# Patient Record
Sex: FEMALE | Race: OTHER | HISPANIC OR LATINO | URBAN - METROPOLITAN AREA
[De-identification: names, ages, dates, MRNs, and addresses within clinical notes are randomized per-mention and may not be internally consistent; named-entity substitution may affect disease eponyms.]

---

## 2023-07-18 ENCOUNTER — INPATIENT (INPATIENT)
Facility: HOSPITAL | Age: 73
LOS: 6 days | Discharge: HOME | End: 2023-07-25
Attending: HOSPITALIST | Admitting: HOSPITALIST
Payer: MEDICAID

## 2023-07-18 VITALS — RESPIRATION RATE: 20 BRPM | HEART RATE: 100 BPM | OXYGEN SATURATION: 100 %

## 2023-07-18 DIAGNOSIS — I51.9 HEART DISEASE, UNSPECIFIED: ICD-10-CM

## 2023-07-18 LAB
A1C WITH ESTIMATED AVERAGE GLUCOSE RESULT: 6.9 % — HIGH (ref 4–5.6)
ALBUMIN SERPL ELPH-MCNC: 4.1 G/DL — SIGNIFICANT CHANGE UP (ref 3.3–5)
ALP SERPL-CCNC: 191 U/L — HIGH (ref 40–120)
ALT FLD-CCNC: 62 U/L — HIGH (ref 4–33)
ANION GAP SERPL CALC-SCNC: 15 MMOL/L — HIGH (ref 7–14)
APTT BLD: 35.8 SEC — SIGNIFICANT CHANGE UP (ref 27–36.3)
AST SERPL-CCNC: 42 U/L — HIGH (ref 4–32)
BASOPHILS # BLD AUTO: 0.1 K/UL — SIGNIFICANT CHANGE UP (ref 0–0.2)
BASOPHILS NFR BLD AUTO: 1.1 % — SIGNIFICANT CHANGE UP (ref 0–2)
BILIRUB DIRECT SERPL-MCNC: 1.2 MG/DL — HIGH (ref 0–0.3)
BILIRUB INDIRECT FLD-MCNC: 1 MG/DL — SIGNIFICANT CHANGE UP (ref 0–1)
BILIRUB SERPL-MCNC: 2.2 MG/DL — HIGH (ref 0.2–1.2)
BUN SERPL-MCNC: 20 MG/DL — SIGNIFICANT CHANGE UP (ref 7–23)
CALCIUM SERPL-MCNC: 10 MG/DL — SIGNIFICANT CHANGE UP (ref 8.4–10.5)
CHLORIDE SERPL-SCNC: 81 MMOL/L — LOW (ref 98–107)
CHOLEST SERPL-MCNC: 167 MG/DL — SIGNIFICANT CHANGE UP
CO2 SERPL-SCNC: 32 MMOL/L — HIGH (ref 22–31)
CREAT SERPL-MCNC: 0.69 MG/DL — SIGNIFICANT CHANGE UP (ref 0.5–1.3)
EGFR: 91 ML/MIN/1.73M2 — SIGNIFICANT CHANGE UP
EOSINOPHIL # BLD AUTO: 0.38 K/UL — SIGNIFICANT CHANGE UP (ref 0–0.5)
EOSINOPHIL NFR BLD AUTO: 4.3 % — SIGNIFICANT CHANGE UP (ref 0–6)
ESTIMATED AVERAGE GLUCOSE: 151 — SIGNIFICANT CHANGE UP
GLUCOSE SERPL-MCNC: 148 MG/DL — HIGH (ref 70–99)
HCT VFR BLD CALC: 38.4 % — SIGNIFICANT CHANGE UP (ref 34.5–45)
HDLC SERPL-MCNC: 65 MG/DL — SIGNIFICANT CHANGE UP
HGB BLD-MCNC: 12.5 G/DL — SIGNIFICANT CHANGE UP (ref 11.5–15.5)
IANC: 6.31 K/UL — SIGNIFICANT CHANGE UP (ref 1.8–7.4)
IMM GRANULOCYTES NFR BLD AUTO: 0.9 % — SIGNIFICANT CHANGE UP (ref 0–0.9)
INR BLD: 1.58 RATIO — HIGH (ref 0.88–1.16)
LACTATE BLDV-MCNC: 1.7 MMOL/L — SIGNIFICANT CHANGE UP (ref 0.5–2)
LIPID PNL WITH DIRECT LDL SERPL: 90 MG/DL — SIGNIFICANT CHANGE UP
LYMPHOCYTES # BLD AUTO: 1.06 K/UL — SIGNIFICANT CHANGE UP (ref 1–3.3)
LYMPHOCYTES # BLD AUTO: 11.9 % — LOW (ref 13–44)
MCHC RBC-ENTMCNC: 26.7 PG — LOW (ref 27–34)
MCHC RBC-ENTMCNC: 32.6 GM/DL — SIGNIFICANT CHANGE UP (ref 32–36)
MCV RBC AUTO: 82.1 FL — SIGNIFICANT CHANGE UP (ref 80–100)
MONOCYTES # BLD AUTO: 0.96 K/UL — HIGH (ref 0–0.9)
MONOCYTES NFR BLD AUTO: 10.8 % — SIGNIFICANT CHANGE UP (ref 2–14)
NEUTROPHILS # BLD AUTO: 6.31 K/UL — SIGNIFICANT CHANGE UP (ref 1.8–7.4)
NEUTROPHILS NFR BLD AUTO: 71 % — SIGNIFICANT CHANGE UP (ref 43–77)
NON HDL CHOLESTEROL: 102 MG/DL — SIGNIFICANT CHANGE UP
NRBC # BLD: 0 /100 WBCS — SIGNIFICANT CHANGE UP (ref 0–0)
NRBC # FLD: 0 K/UL — SIGNIFICANT CHANGE UP (ref 0–0)
PLATELET # BLD AUTO: 400 K/UL — SIGNIFICANT CHANGE UP (ref 150–400)
POTASSIUM SERPL-MCNC: 2.8 MMOL/L — CRITICAL LOW (ref 3.5–5.3)
POTASSIUM SERPL-SCNC: 2.8 MMOL/L — CRITICAL LOW (ref 3.5–5.3)
PROT SERPL-MCNC: 7.4 G/DL — SIGNIFICANT CHANGE UP (ref 6–8.3)
PROTHROM AB SERPL-ACNC: 18.4 SEC — HIGH (ref 10.5–13.4)
RBC # BLD: 4.68 M/UL — SIGNIFICANT CHANGE UP (ref 3.8–5.2)
RBC # FLD: 18.4 % — HIGH (ref 10.3–14.5)
SODIUM SERPL-SCNC: 128 MMOL/L — LOW (ref 135–145)
TRIGL SERPL-MCNC: 58 MG/DL — SIGNIFICANT CHANGE UP
TSH SERPL-MCNC: 1.29 UIU/ML — SIGNIFICANT CHANGE UP (ref 0.27–4.2)
WBC # BLD: 8.89 K/UL — SIGNIFICANT CHANGE UP (ref 3.8–10.5)
WBC # FLD AUTO: 8.89 K/UL — SIGNIFICANT CHANGE UP (ref 3.8–10.5)

## 2023-07-18 PROCEDURE — 71045 X-RAY EXAM CHEST 1 VIEW: CPT | Mod: 26

## 2023-07-18 RX ORDER — HEPARIN SODIUM 5000 [USP'U]/ML
950 INJECTION INTRAVENOUS; SUBCUTANEOUS
Qty: 25000 | Refills: 0 | Status: DISCONTINUED | OUTPATIENT
Start: 2023-07-18 | End: 2023-07-20

## 2023-07-18 RX ORDER — FUROSEMIDE 40 MG
10 TABLET ORAL
Qty: 500 | Refills: 0 | Status: DISCONTINUED | OUTPATIENT
Start: 2023-07-18 | End: 2023-07-19

## 2023-07-18 RX ORDER — PANTOPRAZOLE SODIUM 20 MG/1
40 TABLET, DELAYED RELEASE ORAL
Refills: 0 | Status: DISCONTINUED | OUTPATIENT
Start: 2023-07-18 | End: 2023-07-25

## 2023-07-18 RX ORDER — CHLORHEXIDINE GLUCONATE 213 G/1000ML
1 SOLUTION TOPICAL
Refills: 0 | Status: DISCONTINUED | OUTPATIENT
Start: 2023-07-18 | End: 2023-07-25

## 2023-07-18 RX ORDER — POTASSIUM CHLORIDE 20 MEQ
10 PACKET (EA) ORAL
Refills: 0 | Status: COMPLETED | OUTPATIENT
Start: 2023-07-18 | End: 2023-07-19

## 2023-07-18 RX ORDER — MILRINONE LACTATE 1 MG/ML
0.12 INJECTION, SOLUTION INTRAVENOUS
Qty: 20 | Refills: 0 | Status: DISCONTINUED | OUTPATIENT
Start: 2023-07-18 | End: 2023-07-23

## 2023-07-18 RX ORDER — POTASSIUM CHLORIDE 20 MEQ
40 PACKET (EA) ORAL EVERY 4 HOURS
Refills: 0 | Status: COMPLETED | OUTPATIENT
Start: 2023-07-18 | End: 2023-07-19

## 2023-07-18 RX ADMIN — Medication 40 MILLIEQUIVALENT(S): at 23:53

## 2023-07-18 RX ADMIN — Medication 100 MILLIEQUIVALENT(S): at 23:49

## 2023-07-18 NOTE — H&P ADULT - ASSESSMENT
73F w/ pmhx  Afib (eliquis), ICD(placed at [place] 5/2023), and HF transferred to [facility] CCU. Initially presented to [facility] with c/o SOB, found to have volume overload, admitted to their CCU for inotrope assisted diuresis. Today, pt transferred with lasix drip at 10mg/hr, milrinone drip 0.375mcg/kg/min and heparin drip 950units/hr infusing to CCU for BIV upgrade,     Neuro  -primarily Mauritanian speaker  -no acute issues    Resp  -sating well on RA  -afebrile  -no acute issues  -CXR ordered    CV  #HFrEF -  -Echo at [facility] on 7/11 shows EF 10-15%  -c/w milrinone drip at 0.375mcg/kg/min  -c/w lasix drip at 10mg/hr  -will check lactate  -on exam, no JVD, mild LLL rales, no LE edema     #Afib  -Chadsvasc 3  -switched home Eliquis to heparin drip   -will check pt/ptt  -EKG shows V-paced rhythm     GI  -DASH/TLC diet  -npo after MN for poss BIV upgrade in am      -indwelling catheter  -strict i's and o's    PPX  #DVT  -c/w heparin drip 73F w/ pmhx  Afib (eliquis), ICD(placed at [place] 5/2023), and HF transferred to [facility] CCU. Initially presented to [facility] with c/o SOB, found to have volume overload, admitted to their CCU for inotrope assisted diuresis. Today, pt transferred with lasix drip at 10mg/hr, milrinone drip 0.375mcg/kg/min and heparin drip 950units/hr infusing to CCU for BIV upgrade,     Neuro  -primarily Kyrgyz speaker  -no acute issues    Resp  -sating well on RA  -afebrile  -no acute issues  -CXR ordered    CV  #HFrEF -  -Echo at [facility] on 7/11 shows EF 10-15%, severe MR, severeTR  -c/w milrinone drip at 0.375mcg/kg/min  -c/w lasix drip at 10mg/hr  -will check lactate  -on exam, no JVD, mild LLL rales, no LE edema   -check echo in am  -poss BIV upgrade tomorrow    #Afib  -Chadsvasc 3  -switched home Eliquis to heparin drip   -will check pt/ptt  -EKG shows Sinus tachy with LBBB,       GI  -DASH/TLC diet  -npo after MN for poss BIV upgrade in am      -indwelling catheter  -strict i's and o's    PPX  #DVT  -c/w heparin drip 73F w/ pmhx  Afib (eliquis), ICD(placed at [place] 5/2023), and HF transferred to [facility] CCU. Initially presented to [facility] with c/o SOB, found to have volume overload, admitted to their CCU for inotrope assisted diuresis. Today, pt transferred with lasix drip at 10mg/hr, milrinone drip 0.375mcg/kg/min and heparin drip 950units/hr infusing to CCU for BIV upgrade,     Neuro  -primarily Guyanese speaker  -no acute issues    Resp  -sating well on RA  -afebrile  -no acute issues  -CXR ordered    CV  #HFrEF -  -Echo at [facility] on 7/11 shows EF 10-15%, severe MR, severeTR, LV thrombus  -c/w milrinone drip at 0.375mcg/kg/min  -c/w lasix drip at 10mg/hr  -will check lactate  -on exam, no JVD, mild LLL rales, no LE edema   -check echo in am  -poss BIV upgrade tomorrow    #Afib  -Chadsvasc 3  -switched home Eliquis to heparin drip   -will check pt/ptt  -EKG shows Sinus tachy with LBBB,       GI  -DASH/TLC diet  -npo after MN for poss BIV upgrade in am      -indwelling catheter  -strict i's and o's    Endo  #T2DM  -A1C 6.9  -FS q6 hrs while npo and ISS    PPX  #DVT  -c/w heparin drip

## 2023-07-18 NOTE — H&P ADULT - NSICDXPASTMEDICALHX_GEN_ALL_CORE_FT
PAST MEDICAL HISTORY:  Acute on chronic systolic congestive heart failure     Chronic atrial fibrillation     S/P ICD (internal cardiac defibrillator) procedure

## 2023-07-18 NOTE — H&P ADULT - HISTORY OF PRESENT ILLNESS
73F w/ pmhx  Afib (eliquis), ICD(placed at [place] 5/2023), and HF transferred to [facility] CCU. Initially presented to [facility] with c/o SOB, found to have volume overload, admitted to their CCU for inotrope assisted diuresis. Today, pt transferred with lasix drip at 10mg/hr, milrinone drip 0.375mcg/kg/min and heparin drip 950units/hr infusing to CCU for BIV upgrade,  73F w/ pmhx  Afib (eliquis), ICD(placed at [place] 5/2023), and HFrEF (10-15%) transferred from [facility] to [facility] CCU for BIV grade. Initially presented to [facility] with c/o SOB, found to have volume overload, admitted to their CCU for inotrope assisted diuresis. Today, pt transferred with lasix drip at 10mg/hr, milrinone drip 0.375mcg/kg/min and heparin drip 950units/hr infusing. Denies CP, SOB, n/v/d, lightheadedness, dizziness, fever or chills. DaughterRhona at bedside is providing translation and collateral information.

## 2023-07-18 NOTE — PATIENT PROFILE ADULT - FALL HARM RISK - HARM RISK INTERVENTIONS

## 2023-07-18 NOTE — H&P ADULT - NSHPREVIEWOFSYSTEMS_GEN_ALL_CORE
REVIEW OF SYSTEMS:    CONSTITUTIONAL: No weakness, fevers or chills  EYES/ENT: No visual changes;  No vertigo or throat pain   NECK: No pain or stiffness  RESPIRATORY: No cough, wheezing, hemoptysis; + shortness of breath  CARDIOVASCULAR: No chest pain, or palpitations  GASTROINTESTINAL: No abdominal or epigastric pain. No nausea, vomiting, or hematemesis; No diarrhea or constipation. No melena or hematochezia.  GENITOURINARY: No dysuria, frequency or hematuria  NEUROLOGICAL: No numbness or weakness,   SKIN: No itching, rashes

## 2023-07-18 NOTE — H&P ADULT - NSHPPHYSICALEXAM_GEN_ALL_CORE
LOS:     VITALS:   T(C): 36.8 (07-18-23 @ 21:30), Max: 36.8 (07-18-23 @ 21:30)  HR: 103 (07-18-23 @ 22:00) (100 - 104)  BP: 89/53 (07-18-23 @ 22:00) (89/53 - 96/56)  RR: 16 (07-18-23 @ 22:00) (16 - 23)  SpO2: 97% (07-18-23 @ 22:00) (97% - 100%)    GENERAL: NAD, lying in bed comfortably  HEAD:  Atraumatic, Normocephalic  EYES: conjunctiva and sclera clear  ENT: Moist mucous membranes  NECK: Supple, No JVD  CHEST/LUNG: left lower lobe with rales, no rhonchi, wheezing, or rubs. Unlabored respirations  HEART: Regular rate and rhythm; No murmurs, rubs, or gallops  ABDOMEN: BSx4; Soft, nontender, nondistended  EXTREMITIES:  2+ Peripheral Pulses, brisk capillary refill. No clubbing, cyanosis, or edema  NERVOUS SYSTEM:  A&Ox3, no focal deficits   SKIN: No rashes or lesions

## 2023-07-19 LAB
ALBUMIN SERPL ELPH-MCNC: 3.6 G/DL — SIGNIFICANT CHANGE UP (ref 3.3–5)
ALBUMIN SERPL ELPH-MCNC: 3.7 G/DL — SIGNIFICANT CHANGE UP (ref 3.3–5)
ALP SERPL-CCNC: 156 U/L — HIGH (ref 40–120)
ALP SERPL-CCNC: 167 U/L — HIGH (ref 40–120)
ALT FLD-CCNC: 48 U/L — HIGH (ref 4–33)
ALT FLD-CCNC: 56 U/L — HIGH (ref 4–33)
ANION GAP SERPL CALC-SCNC: 13 MMOL/L — SIGNIFICANT CHANGE UP (ref 7–14)
ANION GAP SERPL CALC-SCNC: 13 MMOL/L — SIGNIFICANT CHANGE UP (ref 7–14)
APTT BLD: 42 SEC — HIGH (ref 27–36.3)
APTT BLD: 52.2 SEC — HIGH (ref 27–36.3)
APTT BLD: 72.8 SEC — HIGH (ref 27–36.3)
AST SERPL-CCNC: 36 U/L — HIGH (ref 4–32)
AST SERPL-CCNC: 40 U/L — HIGH (ref 4–32)
BASE EXCESS BLDV CALC-SCNC: 5.9 MMOL/L — HIGH (ref -2–3)
BILIRUB SERPL-MCNC: 1.8 MG/DL — HIGH (ref 0.2–1.2)
BILIRUB SERPL-MCNC: 2.3 MG/DL — HIGH (ref 0.2–1.2)
BLOOD GAS VENOUS COMPREHENSIVE RESULT: SIGNIFICANT CHANGE UP
BUN SERPL-MCNC: 20 MG/DL — SIGNIFICANT CHANGE UP (ref 7–23)
BUN SERPL-MCNC: 21 MG/DL — SIGNIFICANT CHANGE UP (ref 7–23)
CALCIUM SERPL-MCNC: 9.6 MG/DL — SIGNIFICANT CHANGE UP (ref 8.4–10.5)
CALCIUM SERPL-MCNC: 9.9 MG/DL — SIGNIFICANT CHANGE UP (ref 8.4–10.5)
CHLORIDE BLDV-SCNC: 83 MMOL/L — LOW (ref 96–108)
CHLORIDE SERPL-SCNC: 82 MMOL/L — LOW (ref 98–107)
CHLORIDE SERPL-SCNC: 83 MMOL/L — LOW (ref 98–107)
CO2 BLDV-SCNC: 30.9 MMOL/L — HIGH (ref 22–26)
CO2 SERPL-SCNC: 27 MMOL/L — SIGNIFICANT CHANGE UP (ref 22–31)
CO2 SERPL-SCNC: 29 MMOL/L — SIGNIFICANT CHANGE UP (ref 22–31)
CREAT SERPL-MCNC: 0.69 MG/DL — SIGNIFICANT CHANGE UP (ref 0.5–1.3)
CREAT SERPL-MCNC: 0.71 MG/DL — SIGNIFICANT CHANGE UP (ref 0.5–1.3)
EGFR: 89 ML/MIN/1.73M2 — SIGNIFICANT CHANGE UP
EGFR: 91 ML/MIN/1.73M2 — SIGNIFICANT CHANGE UP
GAS PNL BLDV: 120 MMOL/L — CRITICAL LOW (ref 136–145)
GLUCOSE BLDC GLUCOMTR-MCNC: 145 MG/DL — HIGH (ref 70–99)
GLUCOSE BLDC GLUCOMTR-MCNC: 161 MG/DL — HIGH (ref 70–99)
GLUCOSE BLDC GLUCOMTR-MCNC: 173 MG/DL — HIGH (ref 70–99)
GLUCOSE BLDC GLUCOMTR-MCNC: 251 MG/DL — HIGH (ref 70–99)
GLUCOSE BLDV-MCNC: 282 MG/DL — HIGH (ref 70–99)
GLUCOSE SERPL-MCNC: 145 MG/DL — HIGH (ref 70–99)
GLUCOSE SERPL-MCNC: 192 MG/DL — HIGH (ref 70–99)
HCO3 BLDV-SCNC: 30 MMOL/L — HIGH (ref 22–29)
HCT VFR BLD CALC: 36.4 % — SIGNIFICANT CHANGE UP (ref 34.5–45)
HCT VFR BLDA CALC: 36 % — SIGNIFICANT CHANGE UP (ref 34.5–46.5)
HGB BLD CALC-MCNC: 12 G/DL — SIGNIFICANT CHANGE UP (ref 11.7–16.1)
HGB BLD-MCNC: 11.7 G/DL — SIGNIFICANT CHANGE UP (ref 11.5–15.5)
INR BLD: 1.57 RATIO — HIGH (ref 0.88–1.16)
LACTATE BLDV-MCNC: 3 MMOL/L — HIGH (ref 0.5–2)
MAGNESIUM SERPL-MCNC: 1.8 MG/DL — SIGNIFICANT CHANGE UP (ref 1.6–2.6)
MAGNESIUM SERPL-MCNC: 1.9 MG/DL — SIGNIFICANT CHANGE UP (ref 1.6–2.6)
MCHC RBC-ENTMCNC: 26.2 PG — LOW (ref 27–34)
MCHC RBC-ENTMCNC: 32.1 GM/DL — SIGNIFICANT CHANGE UP (ref 32–36)
MCV RBC AUTO: 81.6 FL — SIGNIFICANT CHANGE UP (ref 80–100)
NRBC # BLD: 0 /100 WBCS — SIGNIFICANT CHANGE UP (ref 0–0)
NRBC # FLD: 0 K/UL — SIGNIFICANT CHANGE UP (ref 0–0)
NT-PROBNP SERPL-SCNC: 6806 PG/ML — HIGH
PCO2 BLDV: 39 MMHG — SIGNIFICANT CHANGE UP (ref 39–52)
PH BLDV: 7.49 — HIGH (ref 7.32–7.43)
PHOSPHATE SERPL-MCNC: 3.4 MG/DL — SIGNIFICANT CHANGE UP (ref 2.5–4.5)
PLATELET # BLD AUTO: 367 K/UL — SIGNIFICANT CHANGE UP (ref 150–400)
PO2 BLDV: 56 MMHG — HIGH (ref 25–45)
POTASSIUM BLDV-SCNC: 4.7 MMOL/L — SIGNIFICANT CHANGE UP (ref 3.5–5.1)
POTASSIUM SERPL-MCNC: 3.2 MMOL/L — LOW (ref 3.5–5.3)
POTASSIUM SERPL-MCNC: 4.6 MMOL/L — SIGNIFICANT CHANGE UP (ref 3.5–5.3)
POTASSIUM SERPL-SCNC: 3.2 MMOL/L — LOW (ref 3.5–5.3)
POTASSIUM SERPL-SCNC: 4.6 MMOL/L — SIGNIFICANT CHANGE UP (ref 3.5–5.3)
PROT SERPL-MCNC: 6.9 G/DL — SIGNIFICANT CHANGE UP (ref 6–8.3)
PROT SERPL-MCNC: 7 G/DL — SIGNIFICANT CHANGE UP (ref 6–8.3)
PROTHROM AB SERPL-ACNC: 18.3 SEC — HIGH (ref 10.5–13.4)
RBC # BLD: 4.46 M/UL — SIGNIFICANT CHANGE UP (ref 3.8–5.2)
RBC # FLD: 18.4 % — HIGH (ref 10.3–14.5)
SAO2 % BLDV: 87.1 % — SIGNIFICANT CHANGE UP (ref 67–88)
SODIUM SERPL-SCNC: 123 MMOL/L — LOW (ref 135–145)
SODIUM SERPL-SCNC: 124 MMOL/L — LOW (ref 135–145)
WBC # BLD: 8.29 K/UL — SIGNIFICANT CHANGE UP (ref 3.8–10.5)
WBC # FLD AUTO: 8.29 K/UL — SIGNIFICANT CHANGE UP (ref 3.8–10.5)

## 2023-07-19 PROCEDURE — 93306 TTE W/DOPPLER COMPLETE: CPT | Mod: 26

## 2023-07-19 PROCEDURE — 71045 X-RAY EXAM CHEST 1 VIEW: CPT | Mod: 26

## 2023-07-19 PROCEDURE — 99222 1ST HOSP IP/OBS MODERATE 55: CPT

## 2023-07-19 PROCEDURE — 99291 CRITICAL CARE FIRST HOUR: CPT

## 2023-07-19 PROCEDURE — 93282 PRGRMG EVAL IMPLANTABLE DFB: CPT | Mod: 26

## 2023-07-19 RX ORDER — INSULIN LISPRO 100/ML
VIAL (ML) SUBCUTANEOUS AT BEDTIME
Refills: 0 | Status: DISCONTINUED | OUTPATIENT
Start: 2023-07-19 | End: 2023-07-25

## 2023-07-19 RX ORDER — GLUCAGON INJECTION, SOLUTION 0.5 MG/.1ML
1 INJECTION, SOLUTION SUBCUTANEOUS ONCE
Refills: 0 | Status: DISCONTINUED | OUTPATIENT
Start: 2023-07-19 | End: 2023-07-25

## 2023-07-19 RX ORDER — INSULIN LISPRO 100/ML
VIAL (ML) SUBCUTANEOUS
Refills: 0 | Status: DISCONTINUED | OUTPATIENT
Start: 2023-07-19 | End: 2023-07-25

## 2023-07-19 RX ORDER — SPIRONOLACTONE 25 MG/1
25 TABLET, FILM COATED ORAL DAILY
Refills: 0 | Status: DISCONTINUED | OUTPATIENT
Start: 2023-07-19 | End: 2023-07-25

## 2023-07-19 RX ORDER — SODIUM CHLORIDE 9 MG/ML
1000 INJECTION, SOLUTION INTRAVENOUS
Refills: 0 | Status: DISCONTINUED | OUTPATIENT
Start: 2023-07-19 | End: 2023-07-25

## 2023-07-19 RX ORDER — DEXTROSE 50 % IN WATER 50 %
25 SYRINGE (ML) INTRAVENOUS ONCE
Refills: 0 | Status: DISCONTINUED | OUTPATIENT
Start: 2023-07-19 | End: 2023-07-25

## 2023-07-19 RX ORDER — FUROSEMIDE 40 MG
60 TABLET ORAL
Refills: 0 | Status: DISCONTINUED | OUTPATIENT
Start: 2023-07-19 | End: 2023-07-19

## 2023-07-19 RX ORDER — DEXTROSE 50 % IN WATER 50 %
12.5 SYRINGE (ML) INTRAVENOUS ONCE
Refills: 0 | Status: DISCONTINUED | OUTPATIENT
Start: 2023-07-19 | End: 2023-07-25

## 2023-07-19 RX ORDER — DEXTROSE 50 % IN WATER 50 %
15 SYRINGE (ML) INTRAVENOUS ONCE
Refills: 0 | Status: DISCONTINUED | OUTPATIENT
Start: 2023-07-19 | End: 2023-07-25

## 2023-07-19 RX ORDER — MAGNESIUM SULFATE 500 MG/ML
2 VIAL (ML) INJECTION ONCE
Refills: 0 | Status: COMPLETED | OUTPATIENT
Start: 2023-07-19 | End: 2023-07-19

## 2023-07-19 RX ORDER — SODIUM CHLORIDE 9 MG/ML
250 INJECTION INTRAMUSCULAR; INTRAVENOUS; SUBCUTANEOUS ONCE
Refills: 0 | Status: COMPLETED | OUTPATIENT
Start: 2023-07-19 | End: 2023-07-20

## 2023-07-19 RX ORDER — SPIRONOLACTONE 25 MG/1
25 TABLET, FILM COATED ORAL DAILY
Refills: 0 | Status: DISCONTINUED | OUTPATIENT
Start: 2023-07-19 | End: 2023-07-19

## 2023-07-19 RX ADMIN — Medication 40 MILLIEQUIVALENT(S): at 04:32

## 2023-07-19 RX ADMIN — Medication 40 MILLIEQUIVALENT(S): at 10:51

## 2023-07-19 RX ADMIN — Medication 3: at 12:30

## 2023-07-19 RX ADMIN — PANTOPRAZOLE SODIUM 40 MILLIGRAM(S): 20 TABLET, DELAYED RELEASE ORAL at 05:08

## 2023-07-19 RX ADMIN — Medication 25 GRAM(S): at 05:09

## 2023-07-19 RX ADMIN — HEPARIN SODIUM 10.5 UNIT(S)/HR: 5000 INJECTION INTRAVENOUS; SUBCUTANEOUS at 02:42

## 2023-07-19 RX ADMIN — Medication 100 MILLIEQUIVALENT(S): at 03:45

## 2023-07-19 RX ADMIN — CHLORHEXIDINE GLUCONATE 1 APPLICATION(S): 213 SOLUTION TOPICAL at 06:16

## 2023-07-19 RX ADMIN — Medication 100 MILLIEQUIVALENT(S): at 02:19

## 2023-07-19 NOTE — CONSULT NOTE ADULT - ASSESSMENT
73F w/ PMHx:  Afib (Eliquis) ICD (placed at [place] 5/2023), NICM, HLD and HFrEF transferred to [facility] CCU. She Initially presented to [facility] with acute decompensated HF. She was admitted to their CCU for milrinone gtt and Lasix gtt. Patient was transferred with Lasix drip at 10mg/hr, milrinone drip 0.375mcg/kg/min and heparin drip 950units/hr infusing, to [facility] CCU for BIV upgrade as EKG revealed LBBB. - Echo at [facility] on 7/11 shows EF 10-15%, severe MR, severeTR, LV thrombus    Cardiac:  Acute on chronic systolic heart failure   No JVD     - Reduce milrinone drip and titrate ff as patient tolerates  - Continue diuresis with Lasix 60mg IVP BID as per CCU   - Continue Heparin gtt for LV thrombus  - Continue GDMT    Afib  - CHADS-VASc 3  - Switched home Eliquis to coumadin, on Heparin gtt    - Plan for possible BIV upgrade on Friday if INR therapeutic  - Continue management as per CCU team  - EP will follow

## 2023-07-19 NOTE — PROGRESS NOTE ADULT - CRITICAL CARE ATTENDING COMMENT
73 year old woman with AFIB on Eliquis, ICD placed in May 2023 at [facility], HFrEF (NICM) who was initially admitted to [facility] with dyspnea and fluid overload. Placed in CCU at [facility] and started on Lasix gtt with addition of Milrinone in setting of acute on chronic systolic heart failure. She was transferred to CCU for possible BiVICD upgrade.  TTE at [facility] showed Severe LVD with LV thrombus, severe MR/severe TR    Meds:  Milrinone (was 0.375 mcg/kg/min) now at 0.25 mcg/kg/min  Aldactone 25 mg daily  Lasix gtt at 10 mg/hr  Heparin gtt    #Neuro- Maltese speaking, no active issues  #Pulm-Diuresed with lasix  Continue to monitor  #CV- Acute on chronic systolic heart failure  Continue milrinone- agree with decrease to 0.25 mcg/kg/min  Continue lasix 10 mg/hr  Agree with aldactone  Eventual BiVICD  Continue heparin gtt for LV thrombus  Recheck TTE  #Renal- Cr stable, continue to monitor  #ID- no active issues  #DVT ppx - on heparin gtt 73 year old woman with AFIB on Eliquis, ICD placed in May 2023 at [facility], HFrEF (NICM) who was initially admitted to [facility] with dyspnea and fluid overload. Placed in CCU at [facility] and started on Lasix gtt with addition of Milrinone in setting of acute on chronic systolic heart failure. She was transferred to CCU for possible BiVICD upgrade.  TTE at [facility] showed Severe LVD with LV thrombus, severe MR/severe TR    Meds:  Milrinone (was 0.375 mcg/kg/min) now at 0.25 mcg/kg/min  Aldactone 25 mg daily  Lasix gtt at 10 mg/hr  Heparin gtt    #Neuro- Irish speaking, no active issues  #Pulm-Diuresed with lasix  Continue to monitor  #CV- Acute on chronic systolic heart failure  Continue milrinone- agree with decrease to 0.25 mcg/kg/min  Change lasix 10 mg/hr--change to lasix 60 mg IV BID  Agree with aldactone  Eventual BiVICD  Continue heparin gtt for LV thrombus  Recheck TTE  #Renal- Cr stable, continue to monitor  #ID- no active issues  #DVT ppx - on heparin gtt

## 2023-07-19 NOTE — PROGRESS NOTE ADULT - SUBJECTIVE AND OBJECTIVE BOX
Matti Reed NP  CCU Service  Cardiology   Spect: [id number] ([facility])     PATIENT: LARISSA HAHN, MRN: [id number]    CHIEF COMPLAINT: Patient is a 73y old  Female who presents with a chief complaint of Transferred for BIV upgrade (18 Jul 2023 21:58)      INTERVAL HISTORY/OVERNIGHT EVENTS: No overnight events. Patient's daughter at the bedside.  Denies abdominal pain, chest pain or SOB, cough. Oriented to person, place, and time. Breathing comfortably on room air.    REVIEW OF SYSTEMS:    Constitutional:     [ ] negative [ ] fevers [ ] chills [ ] weight loss [ ] weight gain  HEENT:                  [ ] negative [ ] dry eyes [ ] eye irritation [ ] postnasal drip [ ] nasal congestion  CV:                         [ ] negative  [ ] chest pain [ ] orthopnea [ ] palpitations [ ] murmur  Resp:                     [ ] negative [ ] cough [ ] shortness of breath [ ] dyspnea [ ] wheezing [ ] sputum [ ] hemoptysis  GI:                          [ ] negative [ ] nausea [ ] vomiting [ ] diarrhea [ ] constipation [ ] abd pain [ ] dysphagia   :                        [ ] negative [ ] dysuria [ ] nocturia [ ] hematuria [ ] increased urinary frequency  Musculoskeletal: [ ] negative [ ] back pain [ ] myalgias [ ] arthralgias [ ] fracture  Skin:                       [ ] negative [ ] rash [ ] itch  Neurological:        [ ] negative [ ] headache [ ] dizziness [ ] syncope [ ] weakness [ ] numbness  Psychiatric:           [ ] negative [ ] anxiety [ ] depression  Endocrine:            [ ] negative [x ] diabetes [ ] thyroid problem  Heme/Lymph:      [ ] negative [ ] anemia [ ] bleeding problem  Allergic/Immune: [ ] negative [ ] itchy eyes [ ] nasal discharge [ ] hives [ ] angioedema    [x ] All other systems negative  [ ] Unable to assess ROS because ________.    MEDICATIONS:  MEDICATIONS  (STANDING):    heparin  Infusion 950 Unit(s)/Hr (10.5 mL/Hr) IV Continuous <Continuous>  insulin lispro (ADMELOG) corrective regimen sliding scale   SubCutaneous three times a day before meals  insulin lispro (ADMELOG) corrective regimen sliding scale   SubCutaneous at bedtime  milrinone Infusion 0.375 MICROgram(s)/kG/Min (6.66 mL/Hr) IV Continuous <Continuous>  pantoprazole    Tablet 40 milliGRAM(s) Oral before breakfast  potassium chloride    Tablet ER 40 milliEquivalent(s) Oral every 4 hours  spironolactone 25 milliGRAM(s) Oral daily    MEDICATIONS  (PRN):  dextrose Oral Gel 15 Gram(s) Oral once PRN Blood Glucose LESS THAN 70 milliGRAM(s)/deciliter      ALLERGIES: Allergies    No Known Allergies    Intolerances        OBJECTIVE:  ICU Vital Signs Last 24 Hrs  T(C): 36.6 (19 Jul 2023 06:00), Max: 36.8 (18 Jul 2023 21:30)  T(F): 97.9 (19 Jul 2023 06:00), Max: 98.2 (18 Jul 2023 21:30)  HR: 112 (19 Jul 2023 06:00) (94 - 113)  BP: 99/64 (19 Jul 2023 06:00) (87/52 - 103/60)  BP(mean): 72 (19 Jul 2023 06:00) (57 - 72)  RR: 25 (19 Jul 2023 06:00) (15 - 25)  SpO2: 98% (19 Jul 2023 06:00) (94% - 100%)    CAPILLARY BLOOD GLUCOSE        I&O's Summary    18 Jul 2023 07:01  -  19 Jul 2023 07:00  --------------------------------------------------------  IN: 565 mL / OUT: 600 mL / NET: -35 mL      Daily Height in cm: 157.48 (18 Jul 2023 21:30)        PHYSICAL EXAMINATION:  General: Comfortable, no acute distress, cooperative with exam.  HEENT: Moist mucous membranes.  Respiratory: Fine crackles at the bases, normal respiratory effort, no coughing, wheezes.  CV: RRR, S1S2, no murmurs, rubs or gallops. No JVD. Distal pulses intact.  Abdominal: Soft, nontender, nondistended, no rebound or guarding, normal bowel sounds.  Neurology: AOx3, no focal neuro defects, ANDRES x 4. Warm   Extremities: No pitting edema, + Peripheral pulses.  Cath site:   Tubes:    LABS:                          11.7   8.29  )-----------( 367      ( 19 Jul 2023 02:00 )             36.4     07-19    124<L>  |  82<L>  |  20  ----------------------------<  145<H>  3.2<L>   |  29  |  0.71    Ca    9.9      19 Jul 2023 02:00  Phos  3.4     07-19  Mg     1.80     07-19    TPro  6.9  /  Alb  3.6  /  TBili  2.3<H>  /  DBili  x   /  AST  36<H>  /  ALT  56<H>  /  AlkPhos  167<H>  07-19    LIVER FUNCTIONS - ( 19 Jul 2023 02:00 )  Alb: 3.6 g/dL / Pro: 6.9 g/dL / ALK PHOS: 167 U/L / ALT: 56 U/L / AST: 36 U/L / GGT: x           PT/INR - ( 19 Jul 2023 02:00 )   PT: 18.3 sec;   INR: 1.57 ratio         PTT - ( 19 Jul 2023 02:00 )  PTT:52.2 sec        Urinalysis Basic - ( 19 Jul 2023 02:00 )    Color: x / Appearance: x / SG: x / pH: x  Gluc: 145 mg/dL / Ketone: x  / Bili: x / Urobili: x   Blood: x / Protein: x / Nitrite: x   Leuk Esterase: x / RBC: x / WBC x   Sq Epi: x / Non Sq Epi: x / Bacteria: x        TELEMETRY: V pacing     EKG:     IMAGING:   None

## 2023-07-19 NOTE — CONSULT NOTE ADULT - SUBJECTIVE AND OBJECTIVE BOX
CHIEF COMPLAINT:  Transferred from [facility] for possible BIV upgrade    HISTORY OF PRESENT ILLNESS:  73F w/ pmhx  Afib (Eliquis), hyponatremia, pre diabtes, ICD(placed at [place] 5/2023) for NICM, and HFrEF (10-15%) transferred from [facility] to [facility] CCU for BIV upgrade. Patient initially presented to [facility] with c/o SOB, found to have volume overload. She was admitted to [facility] CCU for inotrope assisted diuresis. Patient was transferred to [facility] with Lasix drip at 10mg/hr, milrinone drip 0.375mcg/kg/min and heparin drip 950units/hr infusing.  Daughter Rhona at bedside is providing translation and collateral information. Patient lives alone, is very active and started to have progressively worsening shortness of breath, [place]. LE edema and got admitted to [facility] for HF management on 7/10/23, as her cardiologist is at [facility]. She was started on IV milrinone drip and IV Lasix drip. EKG revealed LBBB and patient was transferred to [facility] for possible BIV upgrade. As per daughter, patient was admitted to [facility] in 5/2023 with similar complaints and ICD was placed at the time. She is currently off IV Lasix and is on Lasix IVP BID. Denies CP, SOB, n/v/d, lightheadedness, dizziness, fever or chills. Echocardiogram on 7/10/23 revealed severely reduced LV function with EF 10-15%.   PAST MEDICAL & SURGICAL HISTORY:  Chronic atrial fibrillation  S/P ICD (internal cardiac defibrillator) procedure  Acute on chronic systolic congestive heart failure    PREVIOUS DIAGNOSTIC TESTING:    Echocardiogram:  Pending    MEDICATIONS:  furosemide   Injectable 60 milliGRAM(s) IV Push two times a day  heparin  Infusion 950 Unit(s)/Hr IV Continuous <Continuous>  milrinone Infusion 0.25 MICROgram(s)/kG/Min IV Continuous <Continuous>  spironolactone 25 milliGRAM(s) Oral daily  pantoprazole    Tablet 40 milliGRAM(s) Oral before breakfast    dextrose 50% Injectable 12.5 Gram(s) IV Push once  dextrose 50% Injectable 25 Gram(s) IV Push once  dextrose 50% Injectable 25 Gram(s) IV Push once  dextrose Oral Gel 15 Gram(s) Oral once PRN  glucagon  Injectable 1 milliGRAM(s) IntraMuscular once  insulin lispro (ADMELOG) corrective regimen sliding scale   SubCutaneous three times a day before meals  insulin lispro (ADMELOG) corrective regimen sliding scale   SubCutaneous at bedtime    chlorhexidine 2% Cloths 1 Application(s) Topical <User Schedule>  dextrose 5%. 1000 milliLiter(s) IV Continuous <Continuous>  dextrose 5%. 1000 milliLiter(s) IV Continuous <Continuous>      FAMILY HISTORY:  None significant      SOCIAL HISTORY:      [-] Smoker  [-] Alcohol  [-] Drugs    Allergies    No Known Allergies    Intolerances    REVIEW OF SYSTEMS:  CONSTITUTIONAL: No fever, weight loss, or fatigue  EYES: No eye pain, visual disturbances, or discharge  ENMT:  No difficulty hearing, tinnitus, vertigo; No sinus or throat pain  NECK: No pain or stiffness  RESPIRATORY: No cough, wheezing, chills or hemoptysis; No Shortness of Breath  CARDIOVASCULAR: No chest pain, palpitations, passing out, dizziness, or leg swelling  GASTROINTESTINAL: No abdominal or epigastric pain. No nausea, vomiting, or hematemesis; No diarrhea or constipation. No melena or hematochezia.  GENITOURINARY: No dysuria, frequency, hematuria, or incontinence  NEUROLOGICAL: No headaches, memory loss, loss of strength, numbness, or tremors  SKIN: No itching, burning, rashes, or lesions   LYMPH Nodes: No enlarged glands  ENDOCRINE: No heat or cold intolerance; No hair loss  MUSCULOSKELETAL: No joint pain or swelling; No muscle, back, or extremity pain  PSYCHIATRIC: No depression, anxiety, mood swings, or difficulty sleeping  HEME/LYMPH: No easy bruising, or bleeding gums  ALLERY AND IMMUNOLOGIC: No hives or eczema	    [X] All others negative	  [ ] Unable to obtain    PHYSICAL EXAM:  T(C): 36.6 (07-19-23 @ 06:00), Max: 36.8 (07-18-23 @ 21:30)  HR: 99 (07-19-23 @ 12:00) (94 - 114)  BP: 96/61 (07-19-23 @ 12:00) (87/52 - 116/67)  RR: 20 (07-19-23 @ 12:00) (15 - 25)  SpO2: 98% (07-19-23 @ 08:00) (94% - 100%)  Wt(kg): --  I&O's Summary    18 Jul 2023 07:01  -  19 Jul 2023 07:00  --------------------------------------------------------  IN: 587.2 mL / OUT: 600 mL / NET: -12.8 mL    19 Jul 2023 07:01  -  19 Jul 2023 13:34  --------------------------------------------------------  IN: 506.2 mL / OUT: 850 mL / NET: -343.8 mL        Appearance: Normal	  Cardiovascular: Normal S1 S2, No JVD, No murmurs, No edema  Respiratory: Lungs clear to auscultation	  Psychiatry: A & O x 3, Mood & affect appropriate  Gastrointestinal:  Soft, Non-tender, + BS	  Neurologic: Non-focal  Extremities: Normal range of motion, No clubbing, cyanosis or edema    TELEMETRY: V pacing @ 100s	    ECG:  	V pacing @ 101 bpm;   RADIOLOGY:  OTHER: 	  	  LABS:	 	    CARDIAC MARKERS:                          11.7   8.29  )-----------( 367      ( 19 Jul 2023 02:00 )             36.4     07-19    124<L>  |  82<L>  |  20  ----------------------------<  145<H>  3.2<L>   |  29  |  0.71    Ca    9.9      19 Jul 2023 02:00  Phos  3.4     07-19  Mg     1.80     07-19    TPro  6.9  /  Alb  3.6  /  TBili  2.3<H>  /  DBili  x   /  AST  36<H>  /  ALT  56<H>  /  AlkPhos  167<H>  07-19      TSH: Thyroid Stimulating Hormone, Serum: 1.29 uIU/mL (07-18 @ 22:10)

## 2023-07-19 NOTE — PROGRESS NOTE ADULT - ASSESSMENT
73F w/ PMHx:  Afib (Eliquis) ICD (placed at [place] 5/2023), and HFrEF transferred to [facility] CCU. Initially presented to [facility] with c/o SOB, found to have volume overload, admitted to their CCU for Primacor gtt and Lasix gtt. Today, pt transferred with Lasix drip at 10mg/hr, milrinone drip 0.375mcg/kg/min and heparin drip 950units/hr infusing to CCU for BIV upgrade,     Neuro  - Primarily Amharic speaker  - No acute issues    Resp  - Satting well on RA  - No acute issues    Cardiac:  Acute on chronic systolic heart failure   - Not volume overloaded on exam.  No JVD   - Echo at [facility] on 7/11 shows EF 10-15%, severe MR, severeTR, LV thrombus  - Reduced milrinone drip at 0.25 mcg/kg/min  - C/w Lasix drip at 10mg/hr  - UOP:  565 mL / OUT: 600 mL / NET: -35 mL  - Lact 1.7   - Check ECHO   - Monitor I&Os and daily standing weights   - Will need BIV upgrade    LV thrombus   - Heparin gtt     Afib  - CHADS-VASc 3  - Switched home Eliquis to coumadin, on Heparin gtt      GI  - DASH/TLC diet  - ? NPO after MN      - Maria L   - Monitor I&Os     Endo  DMII  - a1c  6.9  - FS q6 hrs while npo and ISS    PPX  DVT PPX   - C/w heparin drip   73F w/ PMHx:  Afib (Eliquis) ICD (placed at [place] 5/2023), and HFrEF transferred to [facility] CCU. Initially presented to [facility] with c/o SOB, found to have volume overload, admitted to their CCU for Primacor gtt and Lasix gtt. Today, pt transferred with Lasix drip at 10mg/hr, milrinone drip 0.375mcg/kg/min and heparin drip 950units/hr infusing to CCU for BIV upgrade,     Neuro  - Primarily Pashto speaker  - No acute issues    Resp  - Satting well on RA  - No acute issues    Cardiac:  Acute on chronic systolic heart failure   - Not volume overloaded on exam.  No JVD   - Echo at [facility] on 7/11 shows EF 10-15%, severe MR, severeTR, LV thrombus  - Reduced milrinone drip at 0.25 mcg/kg/min  - C/w Lasix drip at 10mg/hr  - Started Aldactone 25mg daily   - UOP:  565 mL / OUT: 600 mL / NET: -35 mL  - Lact 1.7   - Check ECHO   - Monitor I&Os and daily standing weights   - Will need BIV upgrade    LV thrombus   - Heparin gtt     Afib  - CHADS-VASc 3  - Switched home Eliquis to coumadin, on Heparin gtt      GI  - DASH/TLC diet  - ? NPO after MN      - Maria L   - Monitor I&Os     Endo  DMII  - a1c  6.9  - FS q6 hrs while npo and ISS    PPX  DVT PPX   - C/w heparin drip   73F w/ PMHx:  Afib (Eliquis) ICD (placed at [place] 5/2023), NICM, HLD and HFrEF transferred to [facility] CCU. Initially presented to [facility] with c/o SOB, found to have volume overload, admitted to their CCU for Primacor gtt and Lasix gtt. Today, pt transferred with Lasix drip at 10mg/hr, milrinone drip 0.375mcg/kg/min and heparin drip 950units/hr infusing to CCU for BIV upgrade,     Neuro  - Primarily Bengali speaker  - No acute issues    Resp  - Satting well on RA  - No acute issues    Cardiac:  Acute on chronic systolic heart failure   - Not volume overloaded on exam.  No JVD   - Echo at [facility] on 7/11 shows EF 10-15%, severe MR, severeTR, LV thrombus  - Reduced milrinone drip at 0.25 mcg/kg/min  - C/w Lasix drip at 10mg/hr  - Started Aldactone 25mg daily   - UOP:  565 mL / OUT: 600 mL / NET: -35 mL  - Lact 1.7   - Check ECHO   - Monitor I&Os and daily standing weights   - Will need BIV upgrade    LV thrombus   - Heparin gtt     Afib  - CHADS-VASc 3  - Switched home Eliquis to coumadin, on Heparin gtt      GI  - DASH/TLC diet  - ? NPO after MN      - Maria L   - Monitor I&Os     Endo  DMII  - a1c  6.9  - FS q6 hrs while npo and ISS    PPX  DVT PPX   - C/w heparin drip   73F w/ PMHx:  Afib (Eliquis) ICD (placed at [place] 5/2023), NICM, HLD and HFrEF transferred to [facility] CCU. Initially presented to [facility] with c/o SOB, found to have volume overload, admitted to their CCU for Primacor gtt and Lasix gtt. Today, pt transferred with Lasix drip at 10mg/hr, milrinone drip 0.375mcg/kg/min and heparin drip 950units/hr infusing to CCU for BIV upgrade.     Neuro  - Primarily Romansh speaker  - No acute issues    Resp  - Satting well on RA  - No acute issues    Cardiac:  Acute on chronic systolic heart failure   - Not volume overloaded on exam.  No JVD   - Echo at [facility] on 7/11 shows EF 10-15%, severe MR, severeTR, LV thrombus  - Reduced milrinone drip at 0.25 mcg/kg/min  - C/w Lasix drip at 10mg/hr  - Started Aldactone 25mg daily   - UOP:  565 mL / OUT: 600 mL / NET: -35 mL  - Lact 1.7   - Check ECHO   - Monitor I&Os and daily standing weights   - Will need BIV upgrade    LV thrombus   - Heparin gtt     Afib  - CHADS-VASc 3  - Switched home Eliquis to coumadin, on Heparin gtt      GI  - DASH/TLC diet  - ? NPO after MN      - Maria L   - Monitor I&Os     Endo  DMII  - a1c  6.9  - FS q6 hrs while npo and ISS    PPX  DVT PPX   - C/w heparin drip   73F w/ PMHx:  Afib (Eliquis) ICD (placed at [place] 5/2023), NICM, HLD and HFrEF transferred to [facility] CCU. Initially presented to [facility] with c/o SOB, found to have volume overload, admitted to their CCU for Primacor gtt and Lasix gtt. Today, pt transferred with Lasix drip at 10mg/hr, milrinone drip 0.375mcg/kg/min and heparin drip 950units/hr infusing to CCU for BIV upgrade.     Neuro  - Primarily Croatian speaker  - No acute issues    Resp  - Satting well on RA  - No acute issues    Cardiac:  Acute on chronic systolic heart failure   - Not volume overloaded on exam.  No JVD   - Echo at [facility] on 7/11 shows EF 10-15%, severe MR, severeTR, LV thrombus  - Reduced milrinone drip at 0.25 mcg/kg/min  - Changed Lasix drip at 10mg/hr to Lasix 60mg IVP BID   - Started Aldactone 25mg daily   - UOP:  565 mL / OUT: 600 mL / NET: -35 mL  - Lact 1.7   - Check ECHO   - Monitor I&Os and daily standing weights   - Will need BIV upgrade    LV thrombus   - Heparin gtt     Afib  - CHADS-VASc 3  - Switched home Eliquis to coumadin, on Heparin gtt      GI  - DASH/TLC diet  - ? NPO after MN      - Maria L   - Monitor I&Os     Endo  DMII  - a1c  6.9  - FS q6 hrs while npo and ISS    PPX  DVT PPX   - C/w heparin drip

## 2023-07-19 NOTE — CONSULT NOTE ADULT - NS ATTEND AMEND GEN_ALL_CORE FT
73F w/ PMHx:  Afib (Eliquis) ICD (placed at [place] 5/2023), NICM, HLD and HFrEF transferred to [facility] CCU. She Initially presented to [facility] with acute decompensated HF. She was admitted to their CCU for milrinone gtt and Lasix gtt. Patient was transferred with Lasix drip at 10mg/hr, milrinone drip 0.375mcg/kg/min and heparin drip 950units/hr infusing, to [facility] CCU for BIV upgrade as EKG revealed LBBB. - Echo at [facility] on 7/11 shows EF 10-15%, severe MR, severe TR, LV thrombus. Reduce milrinone drip and titrate ff as patient tolerates. Continue diuresis with Lasix 60mg IVP BID as per CCU. Continue Heparin gtt for LV thrombus. Continue GDMT. CHADS-VASc 3. Switched home Eliquis to coumadin, on Heparin gtt

## 2023-07-19 NOTE — PROCEDURE NOTE - ADDITIONAL PROCEDURE DETAILS
Appropriate pacing and sensing. Capture threshold tested via iterative testing. No events corresponding to this admission. No reprogramming done Appropriate pacing and sensing. Capture threshold tested via iterative testing. No events corresponding to this admission. No reprogramming done  Device information:  Model # Acticor 7 VR-TDX Serial # [id number]  Lead model # Plexa DX DF4 65/15 Serial # [id number]  Date of implant: 5/26/2023

## 2023-07-20 LAB
ALBUMIN SERPL ELPH-MCNC: 3.5 G/DL — SIGNIFICANT CHANGE UP (ref 3.3–5)
ALBUMIN SERPL ELPH-MCNC: 3.6 G/DL — SIGNIFICANT CHANGE UP (ref 3.3–5)
ALP SERPL-CCNC: 138 U/L — HIGH (ref 40–120)
ALP SERPL-CCNC: 145 U/L — HIGH (ref 40–120)
ALT FLD-CCNC: 42 U/L — HIGH (ref 4–33)
ALT FLD-CCNC: SIGNIFICANT CHANGE UP U/L (ref 4–33)
ANION GAP SERPL CALC-SCNC: 13 MMOL/L — SIGNIFICANT CHANGE UP (ref 7–14)
ANION GAP SERPL CALC-SCNC: 8 MMOL/L — SIGNIFICANT CHANGE UP (ref 7–14)
APTT BLD: 108.4 SEC — HIGH (ref 27–36.3)
APTT BLD: 147.9 SEC — CRITICAL HIGH (ref 27–36.3)
APTT BLD: 53.6 SEC — HIGH (ref 27–36.3)
AST SERPL-CCNC: 34 U/L — HIGH (ref 4–32)
AST SERPL-CCNC: SIGNIFICANT CHANGE UP U/L (ref 4–32)
BASE EXCESS BLDV CALC-SCNC: 4.6 MMOL/L — HIGH (ref -2–3)
BASE EXCESS BLDV CALC-SCNC: 5.3 MMOL/L — HIGH (ref -2–3)
BASE EXCESS BLDV CALC-SCNC: 8.2 MMOL/L — HIGH (ref -2–3)
BASE EXCESS BLDV CALC-SCNC: 9.3 MMOL/L — HIGH (ref -2–3)
BILIRUB SERPL-MCNC: 2 MG/DL — HIGH (ref 0.2–1.2)
BILIRUB SERPL-MCNC: 2 MG/DL — HIGH (ref 0.2–1.2)
BLOOD GAS VENOUS COMPREHENSIVE RESULT: SIGNIFICANT CHANGE UP
BUN SERPL-MCNC: 17 MG/DL — SIGNIFICANT CHANGE UP (ref 7–23)
BUN SERPL-MCNC: 19 MG/DL — SIGNIFICANT CHANGE UP (ref 7–23)
CALCIUM SERPL-MCNC: 10.2 MG/DL — SIGNIFICANT CHANGE UP (ref 8.4–10.5)
CALCIUM SERPL-MCNC: 9.9 MG/DL — SIGNIFICANT CHANGE UP (ref 8.4–10.5)
CHLORIDE BLDV-SCNC: 85 MMOL/L — LOW (ref 96–108)
CHLORIDE BLDV-SCNC: 86 MMOL/L — LOW (ref 96–108)
CHLORIDE BLDV-SCNC: 87 MMOL/L — LOW (ref 96–108)
CHLORIDE BLDV-SCNC: 87 MMOL/L — LOW (ref 96–108)
CHLORIDE SERPL-SCNC: 85 MMOL/L — LOW (ref 98–107)
CHLORIDE SERPL-SCNC: 87 MMOL/L — LOW (ref 98–107)
CO2 BLDV-SCNC: 29.5 MMOL/L — HIGH (ref 22–26)
CO2 BLDV-SCNC: 30.2 MMOL/L — HIGH (ref 22–26)
CO2 BLDV-SCNC: 33.1 MMOL/L — HIGH (ref 22–26)
CO2 BLDV-SCNC: 35.6 MMOL/L — HIGH (ref 22–26)
CO2 SERPL-SCNC: 24 MMOL/L — SIGNIFICANT CHANGE UP (ref 22–31)
CO2 SERPL-SCNC: 31 MMOL/L — SIGNIFICANT CHANGE UP (ref 22–31)
CREAT SERPL-MCNC: 0.63 MG/DL — SIGNIFICANT CHANGE UP (ref 0.5–1.3)
CREAT SERPL-MCNC: 0.74 MG/DL — SIGNIFICANT CHANGE UP (ref 0.5–1.3)
EGFR: 85 ML/MIN/1.73M2 — SIGNIFICANT CHANGE UP
EGFR: 93 ML/MIN/1.73M2 — SIGNIFICANT CHANGE UP
GAS PNL BLDV: 120 MMOL/L — CRITICAL LOW (ref 136–145)
GAS PNL BLDV: 121 MMOL/L — LOW (ref 136–145)
GAS PNL BLDV: 122 MMOL/L — LOW (ref 136–145)
GAS PNL BLDV: 123 MMOL/L — LOW (ref 136–145)
GLUCOSE BLDC GLUCOMTR-MCNC: 125 MG/DL — HIGH (ref 70–99)
GLUCOSE BLDC GLUCOMTR-MCNC: 159 MG/DL — HIGH (ref 70–99)
GLUCOSE BLDC GLUCOMTR-MCNC: 159 MG/DL — HIGH (ref 70–99)
GLUCOSE BLDC GLUCOMTR-MCNC: 207 MG/DL — HIGH (ref 70–99)
GLUCOSE BLDV-MCNC: 142 MG/DL — HIGH (ref 70–99)
GLUCOSE BLDV-MCNC: 154 MG/DL — HIGH (ref 70–99)
GLUCOSE BLDV-MCNC: 213 MG/DL — HIGH (ref 70–99)
GLUCOSE BLDV-MCNC: 218 MG/DL — HIGH (ref 70–99)
GLUCOSE SERPL-MCNC: 143 MG/DL — HIGH (ref 70–99)
GLUCOSE SERPL-MCNC: 157 MG/DL — HIGH (ref 70–99)
HCO3 BLDV-SCNC: 28 MMOL/L — SIGNIFICANT CHANGE UP (ref 22–29)
HCO3 BLDV-SCNC: 29 MMOL/L — SIGNIFICANT CHANGE UP (ref 22–29)
HCO3 BLDV-SCNC: 32 MMOL/L — HIGH (ref 22–29)
HCO3 BLDV-SCNC: 34 MMOL/L — HIGH (ref 22–29)
HCT VFR BLD CALC: 34.7 % — SIGNIFICANT CHANGE UP (ref 34.5–45)
HCT VFR BLD CALC: 34.8 % — SIGNIFICANT CHANGE UP (ref 34.5–45)
HCT VFR BLDA CALC: 33 % — LOW (ref 34.5–46.5)
HCT VFR BLDA CALC: 34 % — LOW (ref 34.5–46.5)
HCT VFR BLDA CALC: 35 % — SIGNIFICANT CHANGE UP (ref 34.5–46.5)
HCT VFR BLDA CALC: 35 % — SIGNIFICANT CHANGE UP (ref 34.5–46.5)
HGB BLD CALC-MCNC: 11.1 G/DL — LOW (ref 11.7–16.1)
HGB BLD CALC-MCNC: 11.2 G/DL — LOW (ref 11.7–16.1)
HGB BLD CALC-MCNC: 11.5 G/DL — LOW (ref 11.7–16.1)
HGB BLD CALC-MCNC: 11.6 G/DL — LOW (ref 11.7–16.1)
HGB BLD-MCNC: 10.9 G/DL — LOW (ref 11.5–15.5)
HGB BLD-MCNC: 11.2 G/DL — LOW (ref 11.5–15.5)
INR BLD: 1.28 RATIO — HIGH (ref 0.88–1.16)
INR BLD: 1.3 RATIO — HIGH (ref 0.88–1.16)
LACTATE BLDV-MCNC: 1.2 MMOL/L — SIGNIFICANT CHANGE UP (ref 0.5–2)
LACTATE BLDV-MCNC: 1.4 MMOL/L — SIGNIFICANT CHANGE UP (ref 0.5–2)
LACTATE BLDV-MCNC: 2.1 MMOL/L — HIGH (ref 0.5–2)
LACTATE BLDV-MCNC: 2.4 MMOL/L — HIGH (ref 0.5–2)
MAGNESIUM SERPL-MCNC: 2.1 MG/DL — SIGNIFICANT CHANGE UP (ref 1.6–2.6)
MAGNESIUM SERPL-MCNC: 2.2 MG/DL — SIGNIFICANT CHANGE UP (ref 1.6–2.6)
MCHC RBC-ENTMCNC: 26.7 PG — LOW (ref 27–34)
MCHC RBC-ENTMCNC: 27.1 PG — SIGNIFICANT CHANGE UP (ref 27–34)
MCHC RBC-ENTMCNC: 31.4 GM/DL — LOW (ref 32–36)
MCHC RBC-ENTMCNC: 32.2 GM/DL — SIGNIFICANT CHANGE UP (ref 32–36)
MCV RBC AUTO: 84.1 FL — SIGNIFICANT CHANGE UP (ref 80–100)
MCV RBC AUTO: 85 FL — SIGNIFICANT CHANGE UP (ref 80–100)
NRBC # BLD: 0 /100 WBCS — SIGNIFICANT CHANGE UP (ref 0–0)
NRBC # BLD: 0 /100 WBCS — SIGNIFICANT CHANGE UP (ref 0–0)
NRBC # FLD: 0 K/UL — SIGNIFICANT CHANGE UP (ref 0–0)
NRBC # FLD: 0 K/UL — SIGNIFICANT CHANGE UP (ref 0–0)
OSMOLALITY SERPL: 275 MOSM/KG — SIGNIFICANT CHANGE UP (ref 275–295)
PCO2 BLDV: 38 MMHG — LOW (ref 39–52)
PCO2 BLDV: 38 MMHG — LOW (ref 39–52)
PCO2 BLDV: 40 MMHG — SIGNIFICANT CHANGE UP (ref 39–52)
PCO2 BLDV: 47 MMHG — SIGNIFICANT CHANGE UP (ref 39–52)
PH BLDV: 7.47 — HIGH (ref 7.32–7.43)
PH BLDV: 7.48 — HIGH (ref 7.32–7.43)
PH BLDV: 7.49 — HIGH (ref 7.32–7.43)
PH BLDV: 7.51 — HIGH (ref 7.32–7.43)
PHOSPHATE SERPL-MCNC: 3.4 MG/DL — SIGNIFICANT CHANGE UP (ref 2.5–4.5)
PHOSPHATE SERPL-MCNC: SIGNIFICANT CHANGE UP MG/DL (ref 2.5–4.5)
PLATELET # BLD AUTO: 350 K/UL — SIGNIFICANT CHANGE UP (ref 150–400)
PLATELET # BLD AUTO: 371 K/UL — SIGNIFICANT CHANGE UP (ref 150–400)
PO2 BLDV: 31 MMHG — SIGNIFICANT CHANGE UP (ref 25–45)
PO2 BLDV: 54 MMHG — HIGH (ref 25–45)
PO2 BLDV: 54 MMHG — HIGH (ref 25–45)
PO2 BLDV: 61 MMHG — HIGH (ref 25–45)
POTASSIUM BLDV-SCNC: 4.1 MMOL/L — SIGNIFICANT CHANGE UP (ref 3.5–5.1)
POTASSIUM BLDV-SCNC: 4.2 MMOL/L — SIGNIFICANT CHANGE UP (ref 3.5–5.1)
POTASSIUM BLDV-SCNC: 4.4 MMOL/L — SIGNIFICANT CHANGE UP (ref 3.5–5.1)
POTASSIUM BLDV-SCNC: 4.4 MMOL/L — SIGNIFICANT CHANGE UP (ref 3.5–5.1)
POTASSIUM SERPL-MCNC: 4.4 MMOL/L — SIGNIFICANT CHANGE UP (ref 3.5–5.3)
POTASSIUM SERPL-MCNC: SIGNIFICANT CHANGE UP MMOL/L (ref 3.5–5.3)
POTASSIUM SERPL-SCNC: 4.4 MMOL/L — SIGNIFICANT CHANGE UP (ref 3.5–5.3)
POTASSIUM SERPL-SCNC: SIGNIFICANT CHANGE UP MMOL/L (ref 3.5–5.3)
PROT SERPL-MCNC: 6.9 G/DL — SIGNIFICANT CHANGE UP (ref 6–8.3)
PROT SERPL-MCNC: SIGNIFICANT CHANGE UP G/DL (ref 6–8.3)
PROTHROM AB SERPL-ACNC: 14.9 SEC — HIGH (ref 10.5–13.4)
PROTHROM AB SERPL-ACNC: 15.1 SEC — HIGH (ref 10.5–13.4)
RBC # BLD: 4.08 M/UL — SIGNIFICANT CHANGE UP (ref 3.8–5.2)
RBC # BLD: 4.14 M/UL — SIGNIFICANT CHANGE UP (ref 3.8–5.2)
RBC # FLD: 18.9 % — HIGH (ref 10.3–14.5)
RBC # FLD: 19.1 % — HIGH (ref 10.3–14.5)
SAO2 % BLDV: 45.9 % — LOW (ref 67–88)
SAO2 % BLDV: 83.6 % — SIGNIFICANT CHANGE UP (ref 67–88)
SAO2 % BLDV: 84.9 % — SIGNIFICANT CHANGE UP (ref 67–88)
SAO2 % BLDV: 89.9 % — HIGH (ref 67–88)
SODIUM SERPL-SCNC: 122 MMOL/L — LOW (ref 135–145)
SODIUM SERPL-SCNC: 126 MMOL/L — LOW (ref 135–145)
WBC # BLD: 6.53 K/UL — SIGNIFICANT CHANGE UP (ref 3.8–10.5)
WBC # BLD: 7.29 K/UL — SIGNIFICANT CHANGE UP (ref 3.8–10.5)
WBC # FLD AUTO: 6.53 K/UL — SIGNIFICANT CHANGE UP (ref 3.8–10.5)
WBC # FLD AUTO: 7.29 K/UL — SIGNIFICANT CHANGE UP (ref 3.8–10.5)

## 2023-07-20 PROCEDURE — 99291 CRITICAL CARE FIRST HOUR: CPT

## 2023-07-20 PROCEDURE — 71045 X-RAY EXAM CHEST 1 VIEW: CPT | Mod: 26

## 2023-07-20 PROCEDURE — 99231 SBSQ HOSP IP/OBS SF/LOW 25: CPT

## 2023-07-20 RX ORDER — SODIUM CHLORIDE 9 MG/ML
250 INJECTION INTRAMUSCULAR; INTRAVENOUS; SUBCUTANEOUS ONCE
Refills: 0 | Status: DISCONTINUED | OUTPATIENT
Start: 2023-07-20 | End: 2023-07-20

## 2023-07-20 RX ORDER — ACETAMINOPHEN 500 MG
650 TABLET ORAL ONCE
Refills: 0 | Status: COMPLETED | OUTPATIENT
Start: 2023-07-20 | End: 2023-07-20

## 2023-07-20 RX ORDER — HEPARIN SODIUM 5000 [USP'U]/ML
2000 INJECTION INTRAVENOUS; SUBCUTANEOUS ONCE
Refills: 0 | Status: DISCONTINUED | OUTPATIENT
Start: 2023-07-20 | End: 2023-07-20

## 2023-07-20 RX ADMIN — CHLORHEXIDINE GLUCONATE 1 APPLICATION(S): 213 SOLUTION TOPICAL at 06:57

## 2023-07-20 RX ADMIN — Medication 1: at 07:58

## 2023-07-20 RX ADMIN — SODIUM CHLORIDE 125 MILLILITER(S): 9 INJECTION INTRAMUSCULAR; INTRAVENOUS; SUBCUTANEOUS at 02:34

## 2023-07-20 RX ADMIN — PANTOPRAZOLE SODIUM 40 MILLIGRAM(S): 20 TABLET, DELAYED RELEASE ORAL at 06:58

## 2023-07-20 NOTE — PROGRESS NOTE ADULT - CRITICAL CARE ATTENDING COMMENT
73 year old woman with AFIB on Eliquis, ICD placed in May 2023 at [facility], HFrEF (NICM) who was initially admitted to [facility] with dyspnea and fluid overload. Placed in CCU at [facility] and started on Lasix gtt with addition of Milrinone in setting of acute on chronic systolic heart failure. She was transferred to CCU for possible BiVICD upgrade.  TTE at [facility] showed Severe LVD with LV thrombus, severe MR/severe TR    TTE 7/19/23  CONCLUSIONS:  1. Calcified trileaflet aortic valve with normal opening.  Minimal aortic regurgitation.  2. Severe left ventricular enlargement.  3. Severe global left ventricular systolic dysfunction.  Severe global hypokinesis. LVEF calculated using biplane  Coffman's method is 13%. No left ventricular thrombus.  4. Unable to determine the severity of diastolic function  due to severe mitral regurgitation. Increased E/e'  is  consistent with elevated left ventricular filling pressure.  5. Normal right atrium.  6. Normal right ventricular size and function. A device  wire is noted in the right heart.  7. Normal tricuspid valve.   Moderate tricuspid  regurgitation.  8. No pericardial effusion seen.  *** No previous Echo exam.    Meds:  Milrinone 0.375 mcg/kg/min  Aldactone 25 mg daily  Heparin gtt    #Neuro- Slovak speaking, no active issues  #Pulm-Diuresed with lasix  Continue to monitor  #CV- Acute on chronic systolic heart failure  Continue milrinone  Give small bolus of 250 cc fluid  Recheck lactate thereafter  Aldactone  Eventual BiVICD  Continue heparin gtt for LV thrombus  #Renal- Cr stable, continue to monitor  #ID- no active issues  #DVT ppx - on heparin gtt 73 year old woman with AFIB on Eliquis, ICD placed in May 2023 at [facility], HFrEF (NICM) who was initially admitted to [facility] with dyspnea and fluid overload. Placed in CCU at [facility] and started on Lasix gtt with addition of Milrinone in setting of acute on chronic systolic heart failure. She was transferred to CCU for possible BiVICD upgrade.  TTE at [facility] showed Severe LVD with LV thrombus, severe MR/severe TR    TTE 7/19/23  CONCLUSIONS:  1. Calcified trileaflet aortic valve with normal opening.  Minimal aortic regurgitation.  2. Severe left ventricular enlargement.  3. Severe global left ventricular systolic dysfunction.  Severe global hypokinesis. LVEF calculated using biplane  Coffman's method is 13%. No left ventricular thrombus.  4. Unable to determine the severity of diastolic function  due to severe mitral regurgitation. Increased E/e'  is  consistent with elevated left ventricular filling pressure.  5. Normal right atrium.  6. Normal right ventricular size and function. A device  wire is noted in the right heart.  7. Normal tricuspid valve.   Moderate tricuspid  regurgitation.  8. No pericardial effusion seen.  *** No previous Echo exam.    Meds:  Milrinone 0.375 mcg/kg/min  Aldactone 25 mg daily  Heparin gtt    #Neuro- Portuguese speaking, no active issues  #Pulm-Diuresed with lasix  Continue to monitor  #CV- Acute on chronic systolic heart failure  Continue milrinone  Recheck lactate 1-2 PM  Aldactone  Eventual BiVICD  Continue heparin gtt for LV thrombus  #Renal- Cr stable, continue to monitor  #ID- no active issues  #DVT ppx - on heparin gtt

## 2023-07-20 NOTE — PROGRESS NOTE ADULT - ASSESSMENT
73F w/ PMHx:  Afib (Eliquis) ICD (placed at [place] 5/2023), NICM, HLD and HFrEF transferred to [facility] CCU. Initially presented to [facility] with c/o SOB, found to have volume overload, admitted to their CCU for Primacor gtt and Lasix gtt. Patient transferred with Lasix drip at 10mg/hr, milrinone drip 0.375mcg/kg/min and heparin drip infusing at 950units/hr admitted to CCU for BIV upgrade.     NEUROLOGIC:  #AOx3, no active issues, New Zealander speaking    RESPIRATORY:  #Satting well on room air, no active issues    CARDIOVASCULAR:  #Acute on chronic systolic heart failure   - Not volume overloaded on exam and no JVD noted  - Echo at [facility] on 7/11 shows EF 10-15%, severe MR, severe TR, +LV thrombus  - TTE this admission- EF 13%, no LV thrombus, elevated LV filling pressure  - Continue milrinone drip at 0.375 mcg/kg/min  - Lasix 60 mg IVP BID d/cd  - Continue Aldactone 25mg daily   - Last 24 hrs: .8 mL,/ OUT: 1550 mL / NET: -936.2 mL  - Lactate downtrending, this AM 1.4  - Monitor I&Os and daily standing weights   - Will need BIV upgrade this admission, tentative ?7/21    #LV thrombus   - History of LV thrombus from [facility] admission  - TTE this admission shows no evidence of LV thrombus  - Continue heparin gtt for Afib    #Afib  - CHADS-VASc 3  - Switched home Eliquis to coumadin, on Heparin gtt      GASTROINTESTINAL:  #DASH/TLC diet  #Continue PPI    /RENAL:  #Hyponatremia this admission  - Na lowest overnight at 122, repeat 126  - Continue to monitor CMP  - Lisa, strict I&Os     ENDOCRINE:  #T2DM  - A1C 6.9  - ISS    DVT PPX:  #Heparin gtt 73F w/ PMHx:  Afib (Eliquis) ICD (placed at [place] 5/2023), NICM, HLD and HFrEF transferred to [facility] CCU. Initially presented to [facility] with c/o SOB, found to have volume overload, admitted to their CCU for Primacor gtt and Lasix gtt. Patient transferred with Lasix drip at 10mg/hr, milrinone drip 0.375mcg/kg/min and heparin drip infusing at 950units/hr admitted to CCU for BIV upgrade.     NEUROLOGIC:  #AOx3, no active issues, Djiboutian speaking    RESPIRATORY:  #Satting well on room air, no active issues    CARDIOVASCULAR:  #Acute on chronic systolic heart failure   - Not volume overloaded on exam and no JVD noted  - Echo at [facility] on 7/11 shows EF 10-15%, severe MR, severe TR, +LV thrombus  - TTE this admission- EF 13%, no LV thrombus, elevated LV filling pressure  - Continue milrinone drip at 0.375 mcg/kg/min  - s/p diuresis with Lasix gtt, Lasix 60 mg IVP BID d/cd  - Continue Aldactone 25mg daily   - Last 24 hrs: .8 mL,/ OUT: 1550 mL / NET: -936.2 mL  - Lactate downtrending, this AM 1.4  - Monitor I&Os and daily standing weights   - Will need BIV upgrade this admission, tentative ?7/21    #LV thrombus   - History of LV thrombus from [facility] admission  - TTE this admission shows no evidence of LV thrombus  - Continue heparin gtt for Afib    #Afib  - CHADS-VASc 3  - Switched home Eliquis to coumadin, on Heparin gtt      GASTROINTESTINAL:  #DASH/TLC diet  #Continue PPI    /RENAL:  #Hyponatremia this admission  - Na lowest overnight at 122, repeat 126  - Continue to monitor CMP  - Lisa, strict I&Os     ENDOCRINE:  #T2DM  - A1C 6.9  - ISS    DVT PPX:  #Heparin gtt

## 2023-07-20 NOTE — PROGRESS NOTE ADULT - SUBJECTIVE AND OBJECTIVE BOX
Patient is a 73y old  Female who presents with a chief complaint of Transferred for BIV upgrade (20 Jul 2023 07:29)    patient requests her daughter who is at bedside to translate, deferred .  Patient denies CP, SOB or palpitations.  Denies orthopnea or PND.    PAST MEDICAL & SURGICAL HISTORY:  Chronic atrial fibrillation    S/P ICD (internal cardiac defibrillator) procedure    Acute on chronic systolic congestive heart failure        MEDICATIONS  (STANDING):  chlorhexidine 2% Cloths 1 Application(s) Topical <User Schedule>  dextrose 5%. 1000 milliLiter(s) (50 mL/Hr) IV Continuous <Continuous>  dextrose 5%. 1000 milliLiter(s) (100 mL/Hr) IV Continuous <Continuous>  dextrose 50% Injectable 12.5 Gram(s) IV Push once  dextrose 50% Injectable 25 Gram(s) IV Push once  dextrose 50% Injectable 25 Gram(s) IV Push once  glucagon  Injectable 1 milliGRAM(s) IntraMuscular once  heparin  Infusion 950 Unit(s)/Hr (8 mL/Hr) IV Continuous <Continuous>  insulin lispro (ADMELOG) corrective regimen sliding scale   SubCutaneous three times a day before meals  insulin lispro (ADMELOG) corrective regimen sliding scale   SubCutaneous at bedtime  milrinone Infusion 0.375 MICROgram(s)/kG/Min (6.66 mL/Hr) IV Continuous <Continuous>  pantoprazole    Tablet 40 milliGRAM(s) Oral before breakfast  spironolactone 25 milliGRAM(s) Oral daily    MEDICATIONS  (PRN):  dextrose Oral Gel 15 Gram(s) Oral once PRN Blood Glucose LESS THAN 70 milliGRAM(s)/deciliter            Vital Signs Last 24 Hrs  T(C): 36.7 (20 Jul 2023 04:00), Max: 37 (19 Jul 2023 20:00)  T(F): 98 (20 Jul 2023 04:00), Max: 98.6 (19 Jul 2023 20:00)  HR: 96 (20 Jul 2023 11:00) (86 - 103)  BP: 96/79 (20 Jul 2023 11:00) (85/48 - 107/61)  BP(mean): 83 (20 Jul 2023 11:00) (57 - 94)  RR: 24 (20 Jul 2023 11:00) (15 - 34)  SpO2: 97% (20 Jul 2023 11:00) (95% - 100%)    Parameters below as of 20 Jul 2023 07:00  Patient On (Oxygen Delivery Method): room air                INTERPRETATION OF TELEMETRY: SR at 90's     ECG:  likely AT at 107 bpm,  LBBB, QRS 183ms        LABS:                        10.9   6.53  )-----------( 350      ( 20 Jul 2023 06:15 )             34.7     07-20    126<L>  |  87<L>  |  17  ----------------------------<  143<H>  4.4   |  31  |  0.74    Ca    10.2      20 Jul 2023 06:15  Phos  3.4     07-20  Mg     2.10     07-20    TPro  6.9  /  Alb  3.5  /  TBili  2.0<H>  /  DBili  x   /  AST  34<H>  /  ALT  42<H>  /  AlkPhos  145<H>  07-20        PT/INR - ( 20 Jul 2023 06:15 )   PT: 14.9 sec;   INR: 1.28 ratio         PTT - ( 20 Jul 2023 06:15 )  PTT:147.9 sec  Urinalysis Basic - ( 20 Jul 2023 06:15 )    Color: x / Appearance: x / SG: x / pH: x  Gluc: 143 mg/dL / Ketone: x  / Bili: x / Urobili: x   Blood: x / Protein: x / Nitrite: x   Leuk Esterase: x / RBC: x / WBC x   Sq Epi: x / Non Sq Epi: x / Bacteria: x        BNP  RADIOLOGY & ADDITIONAL STUDIES:    ------------------------------------------------------------------------  DIMENSIONS:  Dimensions:     Normal Values:  LA:     5.5 cm    2.0 - 4.0 cm  Ao:     3.2 cm    2.0 - 3.8 cm  SEPTUM: 0.7 cm    0.6 - 1.2 cm  PWT:0.8 cm    0.6 - 1.1 cm  LVIDd:  7.8 cm    3.0 - 5.6 cm  LVIDs:  6.7 cm    1.8 - 4.0 cm  Derived Variables:  LVMI: 140 g/m2  RWT: 0.20  Fractional short: 14 %  Ejection Fraction (Modified Coffman Rule): 13 %  ------------------------------------------------------------------------  OBSERVATIONS:  Mitral Valve: Normal mitral valve. Severe  mitral  regurgitation with a vena contracta measured: 0.94 cm.  Aortic Root: Normal aortic root.  Aortic Valve: Calcified trileaflet aortic valve with normal  opening. Minimal aortic regurgitation.  Left Atrium: Severely dilated left atrium.  LA volume index  = 51 cc/m2.  Left Ventricle: Severe global left ventricular systolic  dysfunction. Severe global hypokinesis. LVEF calculated  using biplane Coffman's method is 13%. No left ventricular  thrombus. Severe left ventricular enlargement. Unable to  determine the severity of diastolic function due to severe  mitral regurgitation. Increased E/e'  is consistent with  elevated left ventricular filling pressure.  Right Heart: Normal right atrium. Normal right ventricular  size and function. A device wire is noted in the right  heart. Normal tricuspid valve.   Moderate tricuspid  regurgitation. Normal pulmonic valve.  Pericardium/PleuraNo pericardial effusion seen.  Hemodynamic: Estimated right ventricular systolic pressure  equals 58 mm Hg, assuming right atrial pressure equals 10  mm Hg, consistent with moderate pulmonary hypertension.  ------------------------------------------------------------------------  CONCLUSIONS:  1. Calcified trileaflet aortic valve with normal opening.  Minimal aortic regurgitation.  2. Severe left ventricular enlargement.  3. Severe global left ventricular systolic dysfunction.  Severe global hypokinesis. LVEF calculated using biplane  Coffman's method is 13%. No left ventricular thrombus.  4. Unable to determine the severity of diastolic function  due to severe mitral regurgitation. Increased E/e'  is  consistent with elevated left ventricular filling pressure.  5. Normal right atrium.  6. Normal right ventricular size and function. A device  wire is noted in the right heart.  7. Normal tricuspid valve.   Moderate tricuspid  regurgitation.  8. No pericardial effusion seen.  *** No previous Echo exam.  Communicated results to: Landen Galeano MD through TEAMs  messaging.  ------------------------------------------------------------------------  Confirmed on  7/19/2023 - 16:56:49 by Zully Calloway M.D.  ------------------------------------------------------------------------      PHYSICAL EXAM:    GENERAL: In no apparent distress, frail looking   NECK: Supple and normal thyroid.  No JVD or carotid bruit.  Carotid pulse is 2+ bilaterally.  HEART: Regular rate and rhythm; No murmurs, rubs, or gallops.  L ICD  PULMONARY: Clear to auscultation and perfusion.  No rales, wheezing, or rhonchi bilaterally.  ABDOMEN: Soft, Nontender, Nondistended; Bowel sounds present  EXTREMITIES:  2+ Peripheral Pulses, No clubbing, cyanosis, or edema  NEUROLOGICAL: alert oriented x4, speech clear no focal deficit

## 2023-07-20 NOTE — PROGRESS NOTE ADULT - SUBJECTIVE AND OBJECTIVE BOX
KIRSTIN Tolentino  CCU Service  Cardiology   Spect: [id number] ([facility])     PATIENT: LARISSA HAHN, MRN: [id number]    CHIEF COMPLAINT: Patient is a 73y old  Female who presents with a chief complaint of Transferred for BIV upgrade (2023 13:32)      INTERVAL HISTORY/OVERNIGHT EVENTS: No overnight events. At bedside, patient has been sleeping and eating well. Denies N/V/D/C. Last BM x1 regular yesterday. Denies abdominal pain. Denies chest pain or SOB, cough. Has been ambulating with assistance. Oriented to person, place, and time. Breathing comfortably on room air.    REVIEW OF SYSTEMS:    Constitutional:     [ ] negative [ ] fevers [ ] chills [ ] weight loss [ ] weight gain  HEENT:                  [ ] negative [ ] dry eyes [ ] eye irritation [ ] postnasal drip [ ] nasal congestion  CV:                         [ ] negative  [ ] chest pain [ ] orthopnea [ ] palpitations [ ] murmur  Resp:                     [ ] negative [ ] cough [ ] shortness of breath [ ] dyspnea [ ] wheezing [ ] sputum [ ] hemoptysis  GI:                          [ ] negative [ ] nausea [ ] vomiting [ ] diarrhea [ ] constipation [ ] abd pain [ ] dysphagia   :                        [ ] negative [ ] dysuria [ ] nocturia [ ] hematuria [ ] increased urinary frequency  Musculoskeletal: [ ] negative [ ] back pain [ ] myalgias [ ] arthralgias [ ] fracture  Skin:                       [ ] negative [ ] rash [ ] itch  Neurological:        [ ] negative [ ] headache [ ] dizziness [ ] syncope [ ] weakness [ ] numbness  Psychiatric:           [ ] negative [ ] anxiety [ ] depression  Endocrine:            [ ] negative [ ] diabetes [ ] thyroid problem  Heme/Lymph:      [ ] negative [ ] anemia [ ] bleeding problem  Allergic/Immune: [ ] negative [ ] itchy eyes [ ] nasal discharge [ ] hives [ ] angioedema    [ ] All other systems negative  [ ] Unable to assess ROS because ________.    MEDICATIONS:  MEDICATIONS  (STANDING):  chlorhexidine 2% Cloths 1 Application(s) Topical <User Schedule>  dextrose 5%. 1000 milliLiter(s) (100 mL/Hr) IV Continuous <Continuous>  dextrose 5%. 1000 milliLiter(s) (50 mL/Hr) IV Continuous <Continuous>  dextrose 50% Injectable 12.5 Gram(s) IV Push once  dextrose 50% Injectable 25 Gram(s) IV Push once  dextrose 50% Injectable 25 Gram(s) IV Push once  glucagon  Injectable 1 milliGRAM(s) IntraMuscular once  heparin  Infusion 950 Unit(s)/Hr (8 mL/Hr) IV Continuous <Continuous>  insulin lispro (ADMELOG) corrective regimen sliding scale   SubCutaneous three times a day before meals  insulin lispro (ADMELOG) corrective regimen sliding scale   SubCutaneous at bedtime  milrinone Infusion 0.375 MICROgram(s)/kG/Min (6.66 mL/Hr) IV Continuous <Continuous>  pantoprazole    Tablet 40 milliGRAM(s) Oral before breakfast  spironolactone 25 milliGRAM(s) Oral daily    MEDICATIONS  (PRN):  dextrose Oral Gel 15 Gram(s) Oral once PRN Blood Glucose LESS THAN 70 milliGRAM(s)/deciliter      ALLERGIES: Allergies    No Known Allergies    Intolerances        OBJECTIVE:  ICU Vital Signs Last 24 Hrs  T(C): 36.7 (2023 04:00), Max: 37 (2023 20:00)  T(F): 98 (2023 04:00), Max: 98.6 (2023 20:00)  HR: 86 (2023 06:00) (86 - 114)  BP: 85/48 (2023 06:00) (85/48 - 116/67)  BP(mean): 57 (2023 06:00) (57 - 94)  ABP: --  ABP(mean): --  RR: 23 (2023 06:00) (15 - 34)  SpO2: 96% (2023 06:00) (95% - 100%)    O2 Parameters below as of 2023 19:00  Patient On (Oxygen Delivery Method): room air            Adult Advanced Hemodynamics Last 24 Hrs  CVP(mm Hg): --  CVP(cm H2O): --  CO: --  CI: --  PA: --  PA(mean): --  PCWP: --  SVR: --  SVRI: --  PVR: --  PVRI: --  CAPILLARY BLOOD GLUCOSE      POCT Blood Glucose.: 161 mg/dL (2023 21:49)  POCT Blood Glucose.: 145 mg/dL (2023 16:15)  POCT Blood Glucose.: 251 mg/dL (2023 11:56)  POCT Blood Glucose.: 173 mg/dL (2023 07:41)    CAPILLARY BLOOD GLUCOSE      POCT Blood Glucose.: 161 mg/dL (2023 21:49)    I&O's Summary    2023 07:01  -  2023 07:00  --------------------------------------------------------  IN: 613.8 mL / OUT: 1550 mL / NET: -936.2 mL      Daily     Daily Weight in k (2023 04:00)    PHYSICAL EXAMINATION:  General: Comfortable, no acute distress, cooperative with exam.  HEENT: PERRLA, EOMI, moist mucous membranes.  Respiratory: CTAB, normal respiratory effort, no coughing, wheezes, crackles, or rales.  CV: RRR, S1S2, no murmurs, rubs or gallops. No JVD. Distal pulses intact.  Abdominal: Soft, nontender, nondistended, no rebound or guarding, normal bowel sounds.  Neurology: AOx3, no focal neuro defects, ANDRES x 4.  Extremities: No pitting edema, + Peripheral pulses.  Incisions:   Tubes:    LABS:                          10.9   6.53  )-----------( 350      ( 2023 06:15 )             34.7     07-20    126<L>  |  87<L>  |  17  ----------------------------<  143<H>  4.4   |  31  |  0.74    Ca    10.2      2023 06:15  Phos  3.4     07-20  Mg     2.10     07-20    TPro  6.9  /  Alb  3.5  /  TBili  2.0<H>  /  DBili  x   /  AST  34<H>  /  ALT  42<H>  /  AlkPhos  145<H>  07-20    LIVER FUNCTIONS - ( 2023 06:15 )  Alb: 3.5 g/dL / Pro: 6.9 g/dL / ALK PHOS: 145 U/L / ALT: 42 U/L / AST: 34 U/L / GGT: x           PT/INR - ( 2023 06:15 )   PT: 14.9 sec;   INR: 1.28 ratio         PTT - ( 2023 06:15 )  PTT:147.9 sec        Urinalysis Basic - ( 2023 06:15 )    Color: x / Appearance: x / SG: x / pH: x  Gluc: 143 mg/dL / Ketone: x  / Bili: x / Urobili: x   Blood: x / Protein: x / Nitrite: x   Leuk Esterase: x / RBC: x / WBC x   Sq Epi: x / Non Sq Epi: x / Bacteria: x        TELEMETRY:     EKG:     IMAGING:   TTE with Doppler (w/Cont) (23 @ 14:44)  PROCEDURE: Transthoracic echocardiogram with 2-D, M-Mode  and complete spectral and color flow Doppler.  Intravenous ultrasound enhancing agent was administered for  improved left ventricular endocardial border definition.  Following the intravenous injection of ultrasound enhancing  agent, harmonic imaging was performed.  INDICATION: Heart failure, unspecified (I50.9)  ------------------------------------------------------------------------  DIMENSIONS:  Dimensions:     Normal Values:  LA:     5.5 cm    2.0 - 4.0 cm  Ao:     3.2 cm    2.0 - 3.8 cm  SEPTUM: 0.7 cm    0.6 - 1.2 cm  PWT:0.8 cm    0.6 - 1.1 cm  LVIDd:  7.8 cm    3.0 - 5.6 cm  LVIDs:  6.7 cm    1.8 - 4.0 cm  Derived Variables:  LVMI: 140 g/m2  RWT: 0.20  Fractional short: 14 %  Ejection Fraction (Modified Coffman Rule): 13 %  ------------------------------------------------------------------------  OBSERVATIONS:  Mitral Valve: Normal mitral valve. Severe  mitral  regurgitation with a vena contracta measured: 0.94 cm.  Aortic Root: Normal aortic root.  Aortic Valve: Calcified trileaflet aortic valve with normal  opening. Minimal aortic regurgitation.  Left Atrium: Severely dilated left atrium.  LA volume index  = 51 cc/m2.  Left Ventricle: Severe global left ventricular systolic  dysfunction. Severe global hypokinesis. LVEF calculated  using biplane Coffman's method is 13%. No left ventricular  thrombus. Severe left ventricular enlargement. Unable to  determine the severity of diastolic function due to severe  mitral regurgitation. Increased E/e'  is consistent with  elevated left ventricular filling pressure.  Right Heart: Normal right atrium. Normal right ventricular  size and function. A device wire is noted in the right  heart. Normal tricuspid valve.   Moderate tricuspid  regurgitation. Normal pulmonic valve.  Pericardium/PleuraNo pericardial effusion seen.  Hemodynamic: Estimated right ventricular systolic pressure  equals 58 mm Hg, assuming right atrial pressure equals 10  mm Hg, consistent with moderate pulmonary hypertension.  ------------------------------------------------------------------------  CONCLUSIONS:  1. Calcified trileaflet aortic valve with normal opening.  Minimal aortic regurgitation.  2. Severe left ventricular enlargement.  3. Severe global left ventricular systolic dysfunction.  Severe global hypokinesis. LVEF calculated using biplane  Coffman's method is 13%. No left ventricular thrombus.  4. Unable to determine the severity of diastolic function  due to severe mitral regurgitation. Increased E/e'  is  consistent with elevated left ventricular filling pressure.  5. Normal right atrium.  6. Normal right ventricular size and function. A device  wire is noted in the right heart.  7. Normal tricuspid valve.   Moderate tricuspid  regurgitation.  8. No pericardial effusion seen.  *** No previous Echo exam.    Xray Chest 1 View- PORTABLE-Urgent (Xray Chest 1 View- PORTABLE-Urgent .) (23 @ 22:46)  FINDINGS:  Support devices: Left chest wall AICD.  The cardiomediastinal silhouette is enlarged.  The lungs are clear.  There is no pneumothorax or pleural effusion.  No acute bony abnormality.    IMPRESSION:  Clear lungs.      --- End of Report ---     KIRSTIN Tolentino  CCU Service  Cardiology   Spect: [id number] ([facility])     PATIENT: LARISSA HAHN, MRN: [id number]    CHIEF COMPLAINT: Patient is a 73y old  Female who presents with a chief complaint of Transferred for BIV upgrade (2023 13:32)      INTERVAL HISTORY/OVERNIGHT EVENTS: No overnight events. Patient's daughter at bedside. At bedside, patient has been sleeping and eating well. Denies N/V/D/C. Denies abdominal pain. Denies chest pain or SOB, cough. Oriented to person, place, and time. Breathing comfortably on room air.    REVIEW OF SYSTEMS:  Constitutional:     [ ] negative [ ] fevers [ ] chills [ ] weight loss [ ] weight gain  HEENT:                  [ ] negative [ ] dry eyes [ ] eye irritation [ ] postnasal drip [ ] nasal congestion  CV:                         [ ] negative  [ ] chest pain [ ] orthopnea [ ] palpitations [ ] murmur  Resp:                     [ ] negative [ ] cough [ ] shortness of breath [ ] dyspnea [ ] wheezing [ ] sputum [ ] hemoptysis  GI:                          [ ] negative [ ] nausea [ ] vomiting [ ] diarrhea [ ] constipation [ ] abd pain [ ] dysphagia   :                        [ ] negative [ ] dysuria [ ] nocturia [ ] hematuria [ ] increased urinary frequency  Musculoskeletal: [ ] negative [ ] back pain [ ] myalgias [ ] arthralgias [ ] fracture  Skin:                       [ ] negative [ ] rash [ ] itch  Neurological:        [ ] negative [ ] headache [ ] dizziness [ ] syncope [ ] weakness [ ] numbness  Psychiatric:           [ ] negative [ ] anxiety [ ] depression  Endocrine:            [ ] negative [ ] diabetes [ ] thyroid problem  Heme/Lymph:      [ ] negative [ ] anemia [ ] bleeding problem  Allergic/Immune: [ ] negative [ ] itchy eyes [ ] nasal discharge [ ] hives [ ] angioedema    [x] All other systems negative  [ ] Unable to assess ROS because ________.    MEDICATIONS:  MEDICATIONS  (STANDING):  chlorhexidine 2% Cloths 1 Application(s) Topical <User Schedule>  dextrose 5%. 1000 milliLiter(s) (100 mL/Hr) IV Continuous <Continuous>  dextrose 5%. 1000 milliLiter(s) (50 mL/Hr) IV Continuous <Continuous>  dextrose 50% Injectable 12.5 Gram(s) IV Push once  dextrose 50% Injectable 25 Gram(s) IV Push once  dextrose 50% Injectable 25 Gram(s) IV Push once  glucagon  Injectable 1 milliGRAM(s) IntraMuscular once  heparin  Infusion 950 Unit(s)/Hr (8 mL/Hr) IV Continuous <Continuous>  insulin lispro (ADMELOG) corrective regimen sliding scale   SubCutaneous three times a day before meals  insulin lispro (ADMELOG) corrective regimen sliding scale   SubCutaneous at bedtime  milrinone Infusion 0.375 MICROgram(s)/kG/Min (6.66 mL/Hr) IV Continuous <Continuous>  pantoprazole    Tablet 40 milliGRAM(s) Oral before breakfast  spironolactone 25 milliGRAM(s) Oral daily    MEDICATIONS  (PRN):  dextrose Oral Gel 15 Gram(s) Oral once PRN Blood Glucose LESS THAN 70 milliGRAM(s)/deciliter      ALLERGIES: Allergies  No Known Allergies        OBJECTIVE:  ICU Vital Signs Last 24 Hrs  T(C): 36.7 (2023 04:00), Max: 37 (2023 20:00)  T(F): 98 (2023 04:00), Max: 98.6 (2023 20:00)  HR: 86 (2023 06:00) (86 - 114)  BP: 85/48 (2023 06:00) (85/48 - 116/67)  BP(mean): 57 (2023 06:00) (57 - 94)  ABP: --  ABP(mean): --  RR: 23 (2023 06:00) (15 - 34)  SpO2: 96% (2023 06:00) (95% - 100%)    O2 Parameters below as of 2023 19:00  Patient On (Oxygen Delivery Method): room air        CAPILLARY BLOOD GLUCOSE      POCT Blood Glucose.: 161 mg/dL (2023 21:49)  POCT Blood Glucose.: 145 mg/dL (2023 16:15)  POCT Blood Glucose.: 251 mg/dL (2023 11:56)  POCT Blood Glucose.: 173 mg/dL (2023 07:41)    CAPILLARY BLOOD GLUCOSE      POCT Blood Glucose.: 161 mg/dL (2023 21:49)    I&O's Summary    2023 07:01  -  2023 07:00  --------------------------------------------------------  IN: 613.8 mL / OUT: 1550 mL / NET: -936.2 mL      Daily     Daily Weight in k (2023 04:00)    PHYSICAL EXAMINATION:  General: Comfortable, no acute distress, cooperative with exam.  HEENT: PERRLA, EOMI, moist mucous membranes.  Respiratory: CTAB, normal respiratory effort, no coughing, wheezes, crackles, or rales.  CV: RRR, S1S2, no murmurs, rubs or gallops. No JVD. Distal pulses intact.  Abdominal: Soft, nontender, nondistended, no rebound or guarding, normal bowel sounds.  Neurology: AOx3, no focal neuro defects, ANDRES x 4.  Extremities: No pitting edema, + Peripheral pulses.    LABS:             10.9   6.53  )-----------( 350      ( 2023 06:15 )             34.7     07-20    126<L>  |  87<L>  |  17  ----------------------------<  143<H>  4.4   |  31  |  0.74    Ca    10.2      2023 06:15  Phos  3.4     07-20  Mg     2.10     07-20    TPro  6.9  /  Alb  3.5  /  TBili  2.0<H>  /  DBili  x   /  AST  34<H>  /  ALT  42<H>  /  AlkPhos  145<H>  07-20    LIVER FUNCTIONS - ( 2023 06:15 )  Alb: 3.5 g/dL / Pro: 6.9 g/dL / ALK PHOS: 145 U/L / ALT: 42 U/L / AST: 34 U/L / GGT: x           PT/INR - ( 2023 06:15 )   PT: 14.9 sec;   INR: 1.28 ratio         PTT - ( 2023 06:15 )  PTT:147.9 sec        Urinalysis Basic - ( 2023 06:15 )    Color: x / Appearance: x / SG: x / pH: x  Gluc: 143 mg/dL / Ketone: x  / Bili: x / Urobili: x   Blood: x / Protein: x / Nitrite: x   Leuk Esterase: x / RBC: x / WBC x   Sq Epi: x / Non Sq Epi: x / Bacteria: x        TELEMETRY: V pacing, HR 80s-90s, no significant events noted    IMAGING:   TTE with Doppler (w/Cont) (23 @ 14:44)  PROCEDURE: Transthoracic echocardiogram with 2-D, M-Mode  and complete spectral and color flow Doppler.  Intravenous ultrasound enhancing agent was administered for  improved left ventricular endocardial border definition.  Following the intravenous injection of ultrasound enhancing  agent, harmonic imaging was performed.  INDICATION: Heart failure, unspecified (I50.9)  ------------------------------------------------------------------------  DIMENSIONS:  Dimensions:     Normal Values:  LA:     5.5 cm    2.0 - 4.0 cm  Ao:     3.2 cm    2.0 - 3.8 cm  SEPTUM: 0.7 cm    0.6 - 1.2 cm  PWT:0.8 cm    0.6 - 1.1 cm  LVIDd:  7.8 cm    3.0 - 5.6 cm  LVIDs:  6.7 cm    1.8 - 4.0 cm  Derived Variables:  LVMI: 140 g/m2  RWT: 0.20  Fractional short: 14 %  Ejection Fraction (Modified Coffman Rule): 13 %  ------------------------------------------------------------------------  OBSERVATIONS:  Mitral Valve: Normal mitral valve. Severe  mitral  regurgitation with a vena contracta measured: 0.94 cm.  Aortic Root: Normal aortic root.  Aortic Valve: Calcified trileaflet aortic valve with normal  opening. Minimal aortic regurgitation.  Left Atrium: Severely dilated left atrium.  LA volume index  = 51 cc/m2.  Left Ventricle: Severe global left ventricular systolic  dysfunction. Severe global hypokinesis. LVEF calculated  using biplane Coffman's method is 13%. No left ventricular  thrombus. Severe left ventricular enlargement. Unable to  determine the severity of diastolic function due to severe  mitral regurgitation. Increased E/e'  is consistent with  elevated left ventricular filling pressure.  Right Heart: Normal right atrium. Normal right ventricular  size and function. A device wire is noted in the right  heart. Normal tricuspid valve.   Moderate tricuspid  regurgitation. Normal pulmonic valve.  Pericardium/PleuraNo pericardial effusion seen.  Hemodynamic: Estimated right ventricular systolic pressure  equals 58 mm Hg, assuming right atrial pressure equals 10  mm Hg, consistent with moderate pulmonary hypertension.  ------------------------------------------------------------------------  CONCLUSIONS:  1. Calcified trileaflet aortic valve with normal opening.  Minimal aortic regurgitation.  2. Severe left ventricular enlargement.  3. Severe global left ventricular systolic dysfunction.  Severe global hypokinesis. LVEF calculated using biplane  Coffman's method is 13%. No left ventricular thrombus.  4. Unable to determine the severity of diastolic function  due to severe mitral regurgitation. Increased E/e'  is  consistent with elevated left ventricular filling pressure.  5. Normal right atrium.  6. Normal right ventricular size and function. A device  wire is noted in the right heart.  7. Normal tricuspid valve.   Moderate tricuspid  regurgitation.  8. No pericardial effusion seen.  *** No previous Echo exam.    Xray Chest 1 View- PORTABLE-Urgent (Xray Chest 1 View- PORTABLE-Urgent .) (23 @ 22:46)  FINDINGS:  Support devices: Left chest wall AICD.  The cardiomediastinal silhouette is enlarged.  The lungs are clear.  There is no pneumothorax or pleural effusion.  No acute bony abnormality.    IMPRESSION:  Clear lungs.      --- End of Report ---     KIRSTIN Tolentino  CCU Service  Cardiology   Spect: [id number] ([facility])     PATIENT: LARISSA HAHN, MRN: [id number]    CHIEF COMPLAINT: Patient is a 73y old  Female who presents with a chief complaint of Transferred for BIV upgrade (2023 13:32)      INTERVAL HISTORY/OVERNIGHT EVENTS: No overnight events. Patient's daughter at bedside. At bedside, patient has been sleeping and eating well. Denies N/V/D/C. Denies abdominal pain. Denies chest pain or SOB, cough. Oriented to person, place, and time. Breathing comfortably on room air.    REVIEW OF SYSTEMS:  Constitutional:     [ ] negative [ ] fevers [ ] chills [ ] weight loss [ ] weight gain  HEENT:                  [ ] negative [ ] dry eyes [ ] eye irritation [ ] postnasal drip [ ] nasal congestion  CV:                         [ ] negative  [ ] chest pain [ ] orthopnea [ ] palpitations [ ] murmur  Resp:                     [ ] negative [ ] cough [ ] shortness of breath [ ] dyspnea [ ] wheezing [ ] sputum [ ] hemoptysis  GI:                          [ ] negative [ ] nausea [ ] vomiting [ ] diarrhea [ ] constipation [ ] abd pain [ ] dysphagia   :                        [ ] negative [ ] dysuria [ ] nocturia [ ] hematuria [ ] increased urinary frequency  Musculoskeletal: [ ] negative [ ] back pain [ ] myalgias [ ] arthralgias [ ] fracture  Skin:                       [ ] negative [ ] rash [ ] itch  Neurological:        [ ] negative [ ] headache [ ] dizziness [ ] syncope [ ] weakness [ ] numbness  Psychiatric:           [ ] negative [ ] anxiety [ ] depression  Endocrine:            [ ] negative [ ] diabetes [ ] thyroid problem  Heme/Lymph:      [ ] negative [ ] anemia [ ] bleeding problem  Allergic/Immune: [ ] negative [ ] itchy eyes [ ] nasal discharge [ ] hives [ ] angioedema    [x] All other systems negative  [ ] Unable to assess ROS because ________.    MEDICATIONS:  MEDICATIONS  (STANDING):  chlorhexidine 2% Cloths 1 Application(s) Topical <User Schedule>  dextrose 5%. 1000 milliLiter(s) (100 mL/Hr) IV Continuous <Continuous>  dextrose 5%. 1000 milliLiter(s) (50 mL/Hr) IV Continuous <Continuous>  dextrose 50% Injectable 12.5 Gram(s) IV Push once  dextrose 50% Injectable 25 Gram(s) IV Push once  dextrose 50% Injectable 25 Gram(s) IV Push once  glucagon  Injectable 1 milliGRAM(s) IntraMuscular once  heparin  Infusion 950 Unit(s)/Hr (8 mL/Hr) IV Continuous <Continuous>  insulin lispro (ADMELOG) corrective regimen sliding scale   SubCutaneous three times a day before meals  insulin lispro (ADMELOG) corrective regimen sliding scale   SubCutaneous at bedtime  milrinone Infusion 0.375 MICROgram(s)/kG/Min (6.66 mL/Hr) IV Continuous <Continuous>  pantoprazole    Tablet 40 milliGRAM(s) Oral before breakfast  spironolactone 25 milliGRAM(s) Oral daily    MEDICATIONS  (PRN):  dextrose Oral Gel 15 Gram(s) Oral once PRN Blood Glucose LESS THAN 70 milliGRAM(s)/deciliter      ALLERGIES: Allergies  No Known Allergies        OBJECTIVE:  ICU Vital Signs Last 24 Hrs  T(C): 36.7 (2023 04:00), Max: 37 (2023 20:00)  T(F): 98 (2023 04:00), Max: 98.6 (2023 20:00)  HR: 86 (2023 06:00) (86 - 114)  BP: 85/48 (2023 06:00) (85/48 - 116/67)  BP(mean): 57 (2023 06:00) (57 - 94)  ABP: --  ABP(mean): --  RR: 23 (2023 06:00) (15 - 34)  SpO2: 96% (2023 06:00) (95% - 100%)    O2 Parameters below as of 2023 19:00  Patient On (Oxygen Delivery Method): room air        CAPILLARY BLOOD GLUCOSE      POCT Blood Glucose.: 161 mg/dL (2023 21:49)  POCT Blood Glucose.: 145 mg/dL (2023 16:15)  POCT Blood Glucose.: 251 mg/dL (2023 11:56)  POCT Blood Glucose.: 173 mg/dL (2023 07:41)    CAPILLARY BLOOD GLUCOSE      POCT Blood Glucose.: 161 mg/dL (2023 21:49)    I&O's Summary    2023 07:01  -  2023 07:00  --------------------------------------------------------  IN: 613.8 mL / OUT: 1550 mL / NET: -936.2 mL      Daily     Daily Weight in k (2023 04:00)    PHYSICAL EXAMINATION:  General: Comfortable, no acute distress, cooperative with exam.  HEENT: PERRLA, EOMI, moist mucous membranes.  Respiratory: +Minimal fine crackling at bases B/L. Remainder of exam CTAB, normal respiratory effort, no coughing, wheezes, or rales.  CV: RRR, S1S2, no murmurs, rubs or gallops. No JVD. Distal pulses intact.  Abdominal: Soft, nontender, nondistended, no rebound or guarding, normal bowel sounds.  Neurology: AOx3, no focal neuro defects, ANDRES x 4.  Extremities: No pitting edema, + Peripheral pulses.    LABS:             10.9   6.53  )-----------( 350      ( 2023 06:15 )             34.7     07-20    126<L>  |  87<L>  |  17  ----------------------------<  143<H>  4.4   |  31  |  0.74    Ca    10.2      2023 06:15  Phos  3.4     07-20  Mg     2.10     07-20    TPro  6.9  /  Alb  3.5  /  TBili  2.0<H>  /  DBili  x   /  AST  34<H>  /  ALT  42<H>  /  AlkPhos  145<H>  07-20    LIVER FUNCTIONS - ( 2023 06:15 )  Alb: 3.5 g/dL / Pro: 6.9 g/dL / ALK PHOS: 145 U/L / ALT: 42 U/L / AST: 34 U/L / GGT: x           PT/INR - ( 2023 06:15 )   PT: 14.9 sec;   INR: 1.28 ratio         PTT - ( 2023 06:15 )  PTT:147.9 sec        Urinalysis Basic - ( 2023 06:15 )    Color: x / Appearance: x / SG: x / pH: x  Gluc: 143 mg/dL / Ketone: x  / Bili: x / Urobili: x   Blood: x / Protein: x / Nitrite: x   Leuk Esterase: x / RBC: x / WBC x   Sq Epi: x / Non Sq Epi: x / Bacteria: x        TELEMETRY: V pacing, HR 80s-90s, no significant events noted    IMAGING:   TTE with Doppler (w/Cont) (23 @ 14:44)  PROCEDURE: Transthoracic echocardiogram with 2-D, M-Mode  and complete spectral and color flow Doppler.  Intravenous ultrasound enhancing agent was administered for  improved left ventricular endocardial border definition.  Following the intravenous injection of ultrasound enhancing  agent, harmonic imaging was performed.  INDICATION: Heart failure, unspecified (I50.9)  ------------------------------------------------------------------------  DIMENSIONS:  Dimensions:     Normal Values:  LA:     5.5 cm    2.0 - 4.0 cm  Ao:     3.2 cm    2.0 - 3.8 cm  SEPTUM: 0.7 cm    0.6 - 1.2 cm  PWT:0.8 cm    0.6 - 1.1 cm  LVIDd:  7.8 cm    3.0 - 5.6 cm  LVIDs:  6.7 cm    1.8 - 4.0 cm  Derived Variables:  LVMI: 140 g/m2  RWT: 0.20  Fractional short: 14 %  Ejection Fraction (Modified Coffman Rule): 13 %  ------------------------------------------------------------------------  OBSERVATIONS:  Mitral Valve: Normal mitral valve. Severe  mitral  regurgitation with a vena contracta measured: 0.94 cm.  Aortic Root: Normal aortic root.  Aortic Valve: Calcified trileaflet aortic valve with normal  opening. Minimal aortic regurgitation.  Left Atrium: Severely dilated left atrium.  LA volume index  = 51 cc/m2.  Left Ventricle: Severe global left ventricular systolic  dysfunction. Severe global hypokinesis. LVEF calculated  using biplane Coffman's method is 13%. No left ventricular  thrombus. Severe left ventricular enlargement. Unable to  determine the severity of diastolic function due to severe  mitral regurgitation. Increased E/e'  is consistent with  elevated left ventricular filling pressure.  Right Heart: Normal right atrium. Normal right ventricular  size and function. A device wire is noted in the right  heart. Normal tricuspid valve.   Moderate tricuspid  regurgitation. Normal pulmonic valve.  Pericardium/PleuraNo pericardial effusion seen.  Hemodynamic: Estimated right ventricular systolic pressure  equals 58 mm Hg, assuming right atrial pressure equals 10  mm Hg, consistent with moderate pulmonary hypertension.  ------------------------------------------------------------------------  CONCLUSIONS:  1. Calcified trileaflet aortic valve with normal opening.  Minimal aortic regurgitation.  2. Severe left ventricular enlargement.  3. Severe global left ventricular systolic dysfunction.  Severe global hypokinesis. LVEF calculated using biplane  Coffman's method is 13%. No left ventricular thrombus.  4. Unable to determine the severity of diastolic function  due to severe mitral regurgitation. Increased E/e'  is  consistent with elevated left ventricular filling pressure.  5. Normal right atrium.  6. Normal right ventricular size and function. A device  wire is noted in the right heart.  7. Normal tricuspid valve.   Moderate tricuspid  regurgitation.  8. No pericardial effusion seen.  *** No previous Echo exam.    Xray Chest 1 View- PORTABLE-Urgent (Xray Chest 1 View- PORTABLE-Urgent .) (23 @ 22:46)  FINDINGS:  Support devices: Left chest wall AICD.  The cardiomediastinal silhouette is enlarged.  The lungs are clear.  There is no pneumothorax or pleural effusion.  No acute bony abnormality.    IMPRESSION:  Clear lungs.      --- End of Report ---     KIRSTIN Tolentino  CCU Service  Cardiology   Spect: [id number] ([facility])     PATIENT: LARISSA HAHN, MRN: [id number]    CHIEF COMPLAINT: Patient is a 73y old  Female who presents with a chief complaint of Transferred for BIV upgrade (2023 13:32)      INTERVAL HISTORY/OVERNIGHT EVENTS: Received 250 cc NS bolus x1, Lactate 3.0 ->1.2. Patient's daughter at bedside. At bedside, patient has been sleeping and eating well. Denies N/V/D/C. Denies abdominal pain. Denies chest pain or SOB, cough. Oriented to person, place, and time. Breathing comfortably on room air.    REVIEW OF SYSTEMS:  Constitutional:     [ ] negative [ ] fevers [ ] chills [ ] weight loss [ ] weight gain  HEENT:                  [ ] negative [ ] dry eyes [ ] eye irritation [ ] postnasal drip [ ] nasal congestion  CV:                         [ ] negative  [ ] chest pain [ ] orthopnea [ ] palpitations [ ] murmur  Resp:                     [ ] negative [ ] cough [ ] shortness of breath [ ] dyspnea [ ] wheezing [ ] sputum [ ] hemoptysis  GI:                          [ ] negative [ ] nausea [ ] vomiting [ ] diarrhea [ ] constipation [ ] abd pain [ ] dysphagia   :                        [ ] negative [ ] dysuria [ ] nocturia [ ] hematuria [ ] increased urinary frequency  Musculoskeletal: [ ] negative [ ] back pain [ ] myalgias [ ] arthralgias [ ] fracture  Skin:                       [ ] negative [ ] rash [ ] itch  Neurological:        [ ] negative [ ] headache [ ] dizziness [ ] syncope [ ] weakness [ ] numbness  Psychiatric:           [ ] negative [ ] anxiety [ ] depression  Endocrine:            [ ] negative [ ] diabetes [ ] thyroid problem  Heme/Lymph:      [ ] negative [ ] anemia [ ] bleeding problem  Allergic/Immune: [ ] negative [ ] itchy eyes [ ] nasal discharge [ ] hives [ ] angioedema    [x] All other systems negative  [ ] Unable to assess ROS because ________.    MEDICATIONS:  MEDICATIONS  (STANDING):  chlorhexidine 2% Cloths 1 Application(s) Topical <User Schedule>  dextrose 5%. 1000 milliLiter(s) (100 mL/Hr) IV Continuous <Continuous>  dextrose 5%. 1000 milliLiter(s) (50 mL/Hr) IV Continuous <Continuous>  dextrose 50% Injectable 12.5 Gram(s) IV Push once  dextrose 50% Injectable 25 Gram(s) IV Push once  dextrose 50% Injectable 25 Gram(s) IV Push once  glucagon  Injectable 1 milliGRAM(s) IntraMuscular once  heparin  Infusion 950 Unit(s)/Hr (8 mL/Hr) IV Continuous <Continuous>  insulin lispro (ADMELOG) corrective regimen sliding scale   SubCutaneous three times a day before meals  insulin lispro (ADMELOG) corrective regimen sliding scale   SubCutaneous at bedtime  milrinone Infusion 0.375 MICROgram(s)/kG/Min (6.66 mL/Hr) IV Continuous <Continuous>  pantoprazole    Tablet 40 milliGRAM(s) Oral before breakfast  spironolactone 25 milliGRAM(s) Oral daily    MEDICATIONS  (PRN):  dextrose Oral Gel 15 Gram(s) Oral once PRN Blood Glucose LESS THAN 70 milliGRAM(s)/deciliter      ALLERGIES: Allergies  No Known Allergies        OBJECTIVE:  ICU Vital Signs Last 24 Hrs  T(C): 36.7 (2023 04:00), Max: 37 (2023 20:00)  T(F): 98 (2023 04:00), Max: 98.6 (2023 20:00)  HR: 86 (2023 06:00) (86 - 114)  BP: 85/48 (2023 06:00) (85/48 - 116/67)  BP(mean): 57 (2023 06:00) (57 - 94)  ABP: --  ABP(mean): --  RR: 23 (2023 06:00) (15 - 34)  SpO2: 96% (2023 06:00) (95% - 100%)    O2 Parameters below as of 2023 19:00  Patient On (Oxygen Delivery Method): room air        CAPILLARY BLOOD GLUCOSE      POCT Blood Glucose.: 161 mg/dL (2023 21:49)  POCT Blood Glucose.: 145 mg/dL (2023 16:15)  POCT Blood Glucose.: 251 mg/dL (2023 11:56)  POCT Blood Glucose.: 173 mg/dL (2023 07:41)    CAPILLARY BLOOD GLUCOSE      POCT Blood Glucose.: 161 mg/dL (2023 21:49)    I&O's Summary    2023 07:01  -  2023 07:00  --------------------------------------------------------  IN: 613.8 mL / OUT: 1550 mL / NET: -936.2 mL      Daily     Daily Weight in k (2023 04:00)    PHYSICAL EXAMINATION:  General: Comfortable, no acute distress, cooperative with exam.  HEENT: PERRLA, EOMI, moist mucous membranes.  Respiratory: +Minimal fine crackling at bases B/L. Remainder of exam CTAB, normal respiratory effort, no coughing, wheezes, or rales.  CV: RRR, S1S2, no murmurs, rubs or gallops. No JVD. Distal pulses intact.  Abdominal: Soft, nontender, nondistended, no rebound or guarding, normal bowel sounds.  Neurology: AOx3, no focal neuro defects, ANDRES x 4.  Extremities: No pitting edema, + Peripheral pulses.    LABS:             10.9   6.53  )-----------( 350      ( 2023 06:15 )             34.7     07-20    126<L>  |  87<L>  |  17  ----------------------------<  143<H>  4.4   |  31  |  0.74    Ca    10.2      2023 06:15  Phos  3.4     07-20  Mg     2.10     -20    TPro  6.9  /  Alb  3.5  /  TBili  2.0<H>  /  DBili  x   /  AST  34<H>  /  ALT  42<H>  /  AlkPhos  145<H>  07-20    LIVER FUNCTIONS - ( 2023 06:15 )  Alb: 3.5 g/dL / Pro: 6.9 g/dL / ALK PHOS: 145 U/L / ALT: 42 U/L / AST: 34 U/L / GGT: x           PT/INR - ( 2023 06:15 )   PT: 14.9 sec;   INR: 1.28 ratio         PTT - ( 2023 06:15 )  PTT:147.9 sec        Urinalysis Basic - ( 2023 06:15 )    Color: x / Appearance: x / SG: x / pH: x  Gluc: 143 mg/dL / Ketone: x  / Bili: x / Urobili: x   Blood: x / Protein: x / Nitrite: x   Leuk Esterase: x / RBC: x / WBC x   Sq Epi: x / Non Sq Epi: x / Bacteria: x        TELEMETRY: V pacing, HR 80s-90s, no significant events noted    IMAGING:   TTE with Doppler (w/Cont) (23 @ 14:44)  PROCEDURE: Transthoracic echocardiogram with 2-D, M-Mode  and complete spectral and color flow Doppler.  Intravenous ultrasound enhancing agent was administered for  improved left ventricular endocardial border definition.  Following the intravenous injection of ultrasound enhancing  agent, harmonic imaging was performed.  INDICATION: Heart failure, unspecified (I50.9)  ------------------------------------------------------------------------  DIMENSIONS:  Dimensions:     Normal Values:  LA:     5.5 cm    2.0 - 4.0 cm  Ao:     3.2 cm    2.0 - 3.8 cm  SEPTUM: 0.7 cm    0.6 - 1.2 cm  PWT:0.8 cm    0.6 - 1.1 cm  LVIDd:  7.8 cm    3.0 - 5.6 cm  LVIDs:  6.7 cm    1.8 - 4.0 cm  Derived Variables:  LVMI: 140 g/m2  RWT: 0.20  Fractional short: 14 %  Ejection Fraction (Modified Coffman Rule): 13 %  ------------------------------------------------------------------------  OBSERVATIONS:  Mitral Valve: Normal mitral valve. Severe  mitral  regurgitation with a vena contracta measured: 0.94 cm.  Aortic Root: Normal aortic root.  Aortic Valve: Calcified trileaflet aortic valve with normal  opening. Minimal aortic regurgitation.  Left Atrium: Severely dilated left atrium.  LA volume index  = 51 cc/m2.  Left Ventricle: Severe global left ventricular systolic  dysfunction. Severe global hypokinesis. LVEF calculated  using biplane Coffman's method is 13%. No left ventricular  thrombus. Severe left ventricular enlargement. Unable to  determine the severity of diastolic function due to severe  mitral regurgitation. Increased E/e'  is consistent with  elevated left ventricular filling pressure.  Right Heart: Normal right atrium. Normal right ventricular  size and function. A device wire is noted in the right  heart. Normal tricuspid valve.   Moderate tricuspid  regurgitation. Normal pulmonic valve.  Pericardium/PleuraNo pericardial effusion seen.  Hemodynamic: Estimated right ventricular systolic pressure  equals 58 mm Hg, assuming right atrial pressure equals 10  mm Hg, consistent with moderate pulmonary hypertension.  ------------------------------------------------------------------------  CONCLUSIONS:  1. Calcified trileaflet aortic valve with normal opening.  Minimal aortic regurgitation.  2. Severe left ventricular enlargement.  3. Severe global left ventricular systolic dysfunction.  Severe global hypokinesis. LVEF calculated using biplane  Coffman's method is 13%. No left ventricular thrombus.  4. Unable to determine the severity of diastolic function  due to severe mitral regurgitation. Increased E/e'  is  consistent with elevated left ventricular filling pressure.  5. Normal right atrium.  6. Normal right ventricular size and function. A device  wire is noted in the right heart.  7. Normal tricuspid valve.   Moderate tricuspid  regurgitation.  8. No pericardial effusion seen.  *** No previous Echo exam.    Xray Chest 1 View- PORTABLE-Urgent (Xray Chest 1 View- PORTABLE-Urgent .) (23 @ 22:46)  FINDINGS:  Support devices: Left chest wall AICD.  The cardiomediastinal silhouette is enlarged.  The lungs are clear.  There is no pneumothorax or pleural effusion.  No acute bony abnormality.    IMPRESSION:  Clear lungs.      --- End of Report ---

## 2023-07-20 NOTE — PHYSICAL THERAPY INITIAL EVALUATION ADULT - LEVEL OF CONSCIOUSNESS, REHAB EVAL
Consent: Written consent obtained. Risks include but not limited to lid/brow ptosis, bruising, swelling, diplopia, temporary effect, incomplete chemical denervation. Detail Level: Detailed Lateral Platysmal Bands Units: 0 Additional Area 3 Location: left axilla Expiration Date (Month Year): 12/31/2019 Lot #: [id number] Post-Care Instructions: Patient instructed to not lie down for 4 hours and limit physical activity for 24 hours. Patient instructed not to travel by airplane for 48 hours. Additional Area 2 Location: declote Dilution (U/ 0.1cc): 4 Additional Area 1 Location: transdermal micro aliq neck lift Additional Area 1 Units: 100 Additional Area 5 Location: andre Additional Area 4 Location: lip alert

## 2023-07-20 NOTE — PHYSICAL THERAPY INITIAL EVALUATION ADULT - NSPTDISCHREC_GEN_A_CORE
Home no needs.  Pt operating close to baseline.  Pt would benefit from a rolling walker to assist with ambulation.

## 2023-07-20 NOTE — PROGRESS NOTE ADULT - ASSESSMENT
73F w/ PMHx: P Afib (Eliquis) ICD Biotronik (placed at [facility] 5/2023), NICM, HLD and chronic HFrEF transferred to [facility] CCU. She Initially presented to [facility] with acute decompensated HF. She was admitted to their CCU for milrinone gtt and Lasix gtt. Patient was transferred with Lasix drip at 10mg/hr, milrinone drip 0.375mcg/kg/min and heparin drip 950units/hr infusing, to [facility] CCU for BIV upgrade as EKG revealed LBBB. - Echo at [facility] on 7/11 shows EF 10-15%, severe MR, LV thrombus, repeat TTE on 7/19 revealed LVEF 13%, severe MR and NO LV thrombus seen.  Patient will benefit from cardiac resynchronization therapy (BiV PPM) upgrade given her NYHA Class II HF which now progressed to class III and worsening LVEF and cardiac dyssynchrony SR with LBBB (QRS at 183ms).  Patient and her daughter are amenable to CRT-D procedure.        - Reduce milrinone drip and titrate ff as patient tolerates  - Off IV Lasix now, appears euvolemic   - will d/c Heparin for upgrade BIV   - Continue GDMT  -Discussed with Dr. Galeano  -NPO after midnight for upgrade by Dr. Galeano

## 2023-07-20 NOTE — PHYSICAL THERAPY INITIAL EVALUATION ADULT - ADDITIONAL COMMENTS
Pt lives in a home with family ambulates without assistance, no falls, and stair to negotiate which she receives help from her daughter.

## 2023-07-20 NOTE — PHYSICAL THERAPY INITIAL EVALUATION ADULT - PERTINENT HX OF CURRENT PROBLEM, REHAB EVAL
73F w/ pmhx  Afib (eliquis), ICD(placed at [place] 5/2023), and HFrEF (10-15%) transferred from [facility] to [facility] CCU for BIV grade. Initially presented to [facility] with c/o SOB, found to have volume overload, admitted to their CCU for inotrope assisted diuresis. Today, pt transferred with lasix drip at 10mg/hr, milrinone drip 0.375mcg/kg/min and heparin drip 950units/hr infusing. Denies CP, SOB, n/v/d, lightheadedness, dizziness, fever or chills. DaughterRhona at bedside is providing translation and collateral information.

## 2023-07-21 PROBLEM — I48.20 CHRONIC ATRIAL FIBRILLATION, UNSPECIFIED: Chronic | Status: ACTIVE | Noted: 2023-07-18

## 2023-07-21 PROBLEM — I50.23 ACUTE ON CHRONIC SYSTOLIC (CONGESTIVE) HEART FAILURE: Chronic | Status: ACTIVE | Noted: 2023-07-18

## 2023-07-21 PROBLEM — Z95.810 PRESENCE OF AUTOMATIC (IMPLANTABLE) CARDIAC DEFIBRILLATOR: Chronic | Status: ACTIVE | Noted: 2023-07-18

## 2023-07-21 LAB
ALBUMIN SERPL ELPH-MCNC: 3.5 G/DL — SIGNIFICANT CHANGE UP (ref 3.3–5)
ALBUMIN SERPL ELPH-MCNC: 3.5 G/DL — SIGNIFICANT CHANGE UP (ref 3.3–5)
ALP SERPL-CCNC: 132 U/L — HIGH (ref 40–120)
ALP SERPL-CCNC: 141 U/L — HIGH (ref 40–120)
ALT FLD-CCNC: 33 U/L — SIGNIFICANT CHANGE UP (ref 4–33)
ALT FLD-CCNC: 35 U/L — HIGH (ref 4–33)
ANION GAP SERPL CALC-SCNC: 12 MMOL/L — SIGNIFICANT CHANGE UP (ref 7–14)
ANION GAP SERPL CALC-SCNC: 13 MMOL/L — SIGNIFICANT CHANGE UP (ref 7–14)
APTT BLD: 24.5 SEC — LOW (ref 27–36.3)
AST SERPL-CCNC: 27 U/L — SIGNIFICANT CHANGE UP (ref 4–32)
AST SERPL-CCNC: 29 U/L — SIGNIFICANT CHANGE UP (ref 4–32)
BASE EXCESS BLDV CALC-SCNC: 5.6 MMOL/L — HIGH (ref -2–3)
BASE EXCESS BLDV CALC-SCNC: 6.4 MMOL/L — HIGH (ref -2–3)
BILIRUB SERPL-MCNC: 1.6 MG/DL — HIGH (ref 0.2–1.2)
BILIRUB SERPL-MCNC: 1.7 MG/DL — HIGH (ref 0.2–1.2)
BLD GP AB SCN SERPL QL: NEGATIVE — SIGNIFICANT CHANGE UP
BLOOD GAS VENOUS COMPREHENSIVE RESULT: SIGNIFICANT CHANGE UP
BLOOD GAS VENOUS COMPREHENSIVE RESULT: SIGNIFICANT CHANGE UP
BUN SERPL-MCNC: 22 MG/DL — SIGNIFICANT CHANGE UP (ref 7–23)
BUN SERPL-MCNC: 22 MG/DL — SIGNIFICANT CHANGE UP (ref 7–23)
CALCIUM SERPL-MCNC: 9.4 MG/DL — SIGNIFICANT CHANGE UP (ref 8.4–10.5)
CALCIUM SERPL-MCNC: 9.6 MG/DL — SIGNIFICANT CHANGE UP (ref 8.4–10.5)
CHLORIDE BLDV-SCNC: 87 MMOL/L — LOW (ref 96–108)
CHLORIDE BLDV-SCNC: 88 MMOL/L — LOW (ref 96–108)
CHLORIDE SERPL-SCNC: 86 MMOL/L — LOW (ref 98–107)
CHLORIDE SERPL-SCNC: 86 MMOL/L — LOW (ref 98–107)
CHLORIDE UR-SCNC: <20 MMOL/L — SIGNIFICANT CHANGE UP
CO2 BLDV-SCNC: 31.1 MMOL/L — HIGH (ref 22–26)
CO2 BLDV-SCNC: 31.8 MMOL/L — HIGH (ref 22–26)
CO2 SERPL-SCNC: 24 MMOL/L — SIGNIFICANT CHANGE UP (ref 22–31)
CO2 SERPL-SCNC: 25 MMOL/L — SIGNIFICANT CHANGE UP (ref 22–31)
CREAT SERPL-MCNC: 0.83 MG/DL — SIGNIFICANT CHANGE UP (ref 0.5–1.3)
CREAT SERPL-MCNC: 0.89 MG/DL — SIGNIFICANT CHANGE UP (ref 0.5–1.3)
EGFR: 68 ML/MIN/1.73M2 — SIGNIFICANT CHANGE UP
EGFR: 74 ML/MIN/1.73M2 — SIGNIFICANT CHANGE UP
GAS PNL BLDV: 121 MMOL/L — LOW (ref 136–145)
GAS PNL BLDV: 123 MMOL/L — LOW (ref 136–145)
GAS PNL BLDV: SIGNIFICANT CHANGE UP
GLUCOSE BLDC GLUCOMTR-MCNC: 138 MG/DL — HIGH (ref 70–99)
GLUCOSE BLDC GLUCOMTR-MCNC: 150 MG/DL — HIGH (ref 70–99)
GLUCOSE BLDC GLUCOMTR-MCNC: 169 MG/DL — HIGH (ref 70–99)
GLUCOSE BLDC GLUCOMTR-MCNC: 171 MG/DL — HIGH (ref 70–99)
GLUCOSE BLDV-MCNC: 159 MG/DL — HIGH (ref 70–99)
GLUCOSE BLDV-MCNC: 161 MG/DL — HIGH (ref 70–99)
GLUCOSE SERPL-MCNC: 132 MG/DL — HIGH (ref 70–99)
GLUCOSE SERPL-MCNC: 163 MG/DL — HIGH (ref 70–99)
HCO3 BLDV-SCNC: 30 MMOL/L — HIGH (ref 22–29)
HCO3 BLDV-SCNC: 30 MMOL/L — HIGH (ref 22–29)
HCT VFR BLD CALC: 30.3 % — LOW (ref 34.5–45)
HCT VFR BLD CALC: 31.1 % — LOW (ref 34.5–45)
HCT VFR BLDA CALC: 30 % — LOW (ref 34.5–46.5)
HCT VFR BLDA CALC: 32 % — LOW (ref 34.5–46.5)
HCV AB S/CO SERPL IA: 0.09 S/CO — SIGNIFICANT CHANGE UP (ref 0–0.99)
HCV AB SERPL-IMP: SIGNIFICANT CHANGE UP
HGB BLD CALC-MCNC: 10 G/DL — LOW (ref 11.7–16.1)
HGB BLD CALC-MCNC: 10.5 G/DL — LOW (ref 11.7–16.1)
HGB BLD-MCNC: 10.1 G/DL — LOW (ref 11.5–15.5)
HGB BLD-MCNC: 9.8 G/DL — LOW (ref 11.5–15.5)
INR BLD: 1.14 RATIO — SIGNIFICANT CHANGE UP (ref 0.88–1.16)
LACTATE BLDV-MCNC: 0.9 MMOL/L — SIGNIFICANT CHANGE UP (ref 0.5–2)
LACTATE BLDV-MCNC: 1.4 MMOL/L — SIGNIFICANT CHANGE UP (ref 0.5–2)
MAGNESIUM SERPL-MCNC: 1.7 MG/DL — SIGNIFICANT CHANGE UP (ref 1.6–2.6)
MAGNESIUM SERPL-MCNC: 1.7 MG/DL — SIGNIFICANT CHANGE UP (ref 1.6–2.6)
MCHC RBC-ENTMCNC: 26.6 PG — LOW (ref 27–34)
MCHC RBC-ENTMCNC: 26.9 PG — LOW (ref 27–34)
MCHC RBC-ENTMCNC: 32.3 GM/DL — SIGNIFICANT CHANGE UP (ref 32–36)
MCHC RBC-ENTMCNC: 32.5 GM/DL — SIGNIFICANT CHANGE UP (ref 32–36)
MCV RBC AUTO: 82.1 FL — SIGNIFICANT CHANGE UP (ref 80–100)
MCV RBC AUTO: 82.9 FL — SIGNIFICANT CHANGE UP (ref 80–100)
NRBC # BLD: 0 /100 WBCS — SIGNIFICANT CHANGE UP (ref 0–0)
NRBC # BLD: 0 /100 WBCS — SIGNIFICANT CHANGE UP (ref 0–0)
NRBC # FLD: 0 K/UL — SIGNIFICANT CHANGE UP (ref 0–0)
NRBC # FLD: 0 K/UL — SIGNIFICANT CHANGE UP (ref 0–0)
OSMOLALITY UR: 425 MOSM/KG — SIGNIFICANT CHANGE UP (ref 50–1200)
PCO2 BLDV: 41 MMHG — SIGNIFICANT CHANGE UP (ref 39–52)
PCO2 BLDV: 41 MMHG — SIGNIFICANT CHANGE UP (ref 39–52)
PH BLDV: 7.47 — HIGH (ref 7.32–7.43)
PH BLDV: 7.48 — HIGH (ref 7.32–7.43)
PHOSPHATE SERPL-MCNC: 3.8 MG/DL — SIGNIFICANT CHANGE UP (ref 2.5–4.5)
PHOSPHATE SERPL-MCNC: 4 MG/DL — SIGNIFICANT CHANGE UP (ref 2.5–4.5)
PLATELET # BLD AUTO: 308 K/UL — SIGNIFICANT CHANGE UP (ref 150–400)
PLATELET # BLD AUTO: 327 K/UL — SIGNIFICANT CHANGE UP (ref 150–400)
PO2 BLDV: 54 MMHG — HIGH (ref 25–45)
PO2 BLDV: 73 MMHG — HIGH (ref 25–45)
POTASSIUM BLDV-SCNC: 4 MMOL/L — SIGNIFICANT CHANGE UP (ref 3.5–5.1)
POTASSIUM BLDV-SCNC: 4.2 MMOL/L — SIGNIFICANT CHANGE UP (ref 3.5–5.1)
POTASSIUM SERPL-MCNC: 3.9 MMOL/L — SIGNIFICANT CHANGE UP (ref 3.5–5.3)
POTASSIUM SERPL-MCNC: 3.9 MMOL/L — SIGNIFICANT CHANGE UP (ref 3.5–5.3)
POTASSIUM SERPL-SCNC: 3.9 MMOL/L — SIGNIFICANT CHANGE UP (ref 3.5–5.3)
POTASSIUM SERPL-SCNC: 3.9 MMOL/L — SIGNIFICANT CHANGE UP (ref 3.5–5.3)
POTASSIUM UR-SCNC: 59.7 MMOL/L — SIGNIFICANT CHANGE UP
PROT SERPL-MCNC: 6.1 G/DL — SIGNIFICANT CHANGE UP (ref 6–8.3)
PROT SERPL-MCNC: 6.4 G/DL — SIGNIFICANT CHANGE UP (ref 6–8.3)
PROTHROM AB SERPL-ACNC: 13.2 SEC — SIGNIFICANT CHANGE UP (ref 10.5–13.4)
RBC # BLD: 3.69 M/UL — LOW (ref 3.8–5.2)
RBC # BLD: 3.75 M/UL — LOW (ref 3.8–5.2)
RBC # FLD: 18.9 % — HIGH (ref 10.3–14.5)
RBC # FLD: 19 % — HIGH (ref 10.3–14.5)
RH IG SCN BLD-IMP: POSITIVE — SIGNIFICANT CHANGE UP
SAO2 % BLDV: 85.1 % — SIGNIFICANT CHANGE UP (ref 67–88)
SAO2 % BLDV: 95 % — HIGH (ref 67–88)
SODIUM SERPL-SCNC: 123 MMOL/L — LOW (ref 135–145)
SODIUM SERPL-SCNC: 123 MMOL/L — LOW (ref 135–145)
SODIUM UR-SCNC: <20 MMOL/L — SIGNIFICANT CHANGE UP
WBC # BLD: 6.55 K/UL — SIGNIFICANT CHANGE UP (ref 3.8–10.5)
WBC # BLD: 6.77 K/UL — SIGNIFICANT CHANGE UP (ref 3.8–10.5)
WBC # FLD AUTO: 6.55 K/UL — SIGNIFICANT CHANGE UP (ref 3.8–10.5)
WBC # FLD AUTO: 6.77 K/UL — SIGNIFICANT CHANGE UP (ref 3.8–10.5)

## 2023-07-21 PROCEDURE — 71045 X-RAY EXAM CHEST 1 VIEW: CPT | Mod: 26

## 2023-07-21 PROCEDURE — 99291 CRITICAL CARE FIRST HOUR: CPT

## 2023-07-21 PROCEDURE — 33225 L VENTRIC PACING LEAD ADD-ON: CPT

## 2023-07-21 PROCEDURE — 33234 REMOVAL OF PACEMAKER SYSTEM: CPT

## 2023-07-21 PROCEDURE — 99232 SBSQ HOSP IP/OBS MODERATE 35: CPT

## 2023-07-21 RX ORDER — FUROSEMIDE 40 MG
40 TABLET ORAL ONCE
Refills: 0 | Status: COMPLETED | OUTPATIENT
Start: 2023-07-21 | End: 2023-07-21

## 2023-07-21 RX ORDER — ACETAMINOPHEN 500 MG
650 TABLET ORAL EVERY 6 HOURS
Refills: 0 | Status: DISCONTINUED | OUTPATIENT
Start: 2023-07-21 | End: 2023-07-25

## 2023-07-21 RX ORDER — CEFAZOLIN SODIUM 1 G
1000 VIAL (EA) INJECTION EVERY 8 HOURS
Refills: 0 | Status: COMPLETED | OUTPATIENT
Start: 2023-07-21 | End: 2023-07-21

## 2023-07-21 RX ADMIN — Medication 650 MILLIGRAM(S): at 00:33

## 2023-07-21 RX ADMIN — CHLORHEXIDINE GLUCONATE 1 APPLICATION(S): 213 SOLUTION TOPICAL at 12:27

## 2023-07-21 RX ADMIN — Medication 40 MILLIGRAM(S): at 16:17

## 2023-07-21 RX ADMIN — Medication 650 MILLIGRAM(S): at 21:43

## 2023-07-21 RX ADMIN — MILRINONE LACTATE 6.66 MICROGRAM(S)/KG/MIN: 1 INJECTION, SOLUTION INTRAVENOUS at 12:25

## 2023-07-21 RX ADMIN — Medication 650 MILLIGRAM(S): at 21:13

## 2023-07-21 RX ADMIN — SPIRONOLACTONE 25 MILLIGRAM(S): 25 TABLET, FILM COATED ORAL at 12:27

## 2023-07-21 RX ADMIN — MILRINONE LACTATE 4.44 MICROGRAM(S)/KG/MIN: 1 INJECTION, SOLUTION INTRAVENOUS at 21:16

## 2023-07-21 RX ADMIN — MILRINONE LACTATE 4.44 MICROGRAM(S)/KG/MIN: 1 INJECTION, SOLUTION INTRAVENOUS at 13:45

## 2023-07-21 RX ADMIN — Medication 100 MILLIGRAM(S): at 16:17

## 2023-07-21 RX ADMIN — Medication 650 MILLIGRAM(S): at 00:03

## 2023-07-21 RX ADMIN — Medication 100 MILLIGRAM(S): at 23:41

## 2023-07-21 RX ADMIN — Medication 0: at 21:15

## 2023-07-21 NOTE — PROGRESS NOTE ADULT - SUBJECTIVE AND OBJECTIVE BOX
KIRSTIN Tolentino  CCU Service  Cardiology   Spect: [id number] ([facility])     PATIENT: LARISSA HAHN, MRN: [id number]    CHIEF COMPLAINT: Patient is a 73y old  Female who presents with a chief complaint of Transferred for BIV upgrade (20 Jul 2023 11:50)      INTERVAL HISTORY/OVERNIGHT EVENTS: No overnight events. At bedside, patient has been sleeping and eating well. Denies N/V/D/C. Last BM x1 regular yesterday. Denies abdominal pain. Denies chest pain or SOB, cough. Has been ambulating with assistance. Oriented to person, place, and time. Breathing comfortably on room air.    REVIEW OF SYSTEMS:    Constitutional:     [ ] negative [ ] fevers [ ] chills [ ] weight loss [ ] weight gain  HEENT:                  [ ] negative [ ] dry eyes [ ] eye irritation [ ] postnasal drip [ ] nasal congestion  CV:                         [ ] negative  [ ] chest pain [ ] orthopnea [ ] palpitations [ ] murmur  Resp:                     [ ] negative [ ] cough [ ] shortness of breath [ ] dyspnea [ ] wheezing [ ] sputum [ ] hemoptysis  GI:                          [ ] negative [ ] nausea [ ] vomiting [ ] diarrhea [ ] constipation [ ] abd pain [ ] dysphagia   :                        [ ] negative [ ] dysuria [ ] nocturia [ ] hematuria [ ] increased urinary frequency  Musculoskeletal: [ ] negative [ ] back pain [ ] myalgias [ ] arthralgias [ ] fracture  Skin:                       [ ] negative [ ] rash [ ] itch  Neurological:        [ ] negative [ ] headache [ ] dizziness [ ] syncope [ ] weakness [ ] numbness  Psychiatric:           [ ] negative [ ] anxiety [ ] depression  Endocrine:            [ ] negative [ ] diabetes [ ] thyroid problem  Heme/Lymph:      [ ] negative [ ] anemia [ ] bleeding problem  Allergic/Immune: [ ] negative [ ] itchy eyes [ ] nasal discharge [ ] hives [ ] angioedema    [ ] All other systems negative  [ ] Unable to assess ROS because ________.    MEDICATIONS:  MEDICATIONS  (STANDING):  chlorhexidine 2% Cloths 1 Application(s) Topical <User Schedule>  dextrose 5%. 1000 milliLiter(s) (50 mL/Hr) IV Continuous <Continuous>  dextrose 5%. 1000 milliLiter(s) (100 mL/Hr) IV Continuous <Continuous>  dextrose 50% Injectable 12.5 Gram(s) IV Push once  dextrose 50% Injectable 25 Gram(s) IV Push once  dextrose 50% Injectable 25 Gram(s) IV Push once  glucagon  Injectable 1 milliGRAM(s) IntraMuscular once  insulin lispro (ADMELOG) corrective regimen sliding scale   SubCutaneous three times a day before meals  insulin lispro (ADMELOG) corrective regimen sliding scale   SubCutaneous at bedtime  milrinone Infusion 0.375 MICROgram(s)/kG/Min (6.66 mL/Hr) IV Continuous <Continuous>  pantoprazole    Tablet 40 milliGRAM(s) Oral before breakfast  spironolactone 25 milliGRAM(s) Oral daily    MEDICATIONS  (PRN):  dextrose Oral Gel 15 Gram(s) Oral once PRN Blood Glucose LESS THAN 70 milliGRAM(s)/deciliter      ALLERGIES: Allergies    No Known Allergies    Intolerances        OBJECTIVE:  ICU Vital Signs Last 24 Hrs  T(C): 36.8 (21 Jul 2023 04:00), Max: 36.9 (21 Jul 2023 00:00)  T(F): 98.2 (21 Jul 2023 04:00), Max: 98.5 (21 Jul 2023 00:00)  HR: 90 (21 Jul 2023 06:00) (88 - 108)  BP: 98/58 (21 Jul 2023 06:00) (87/54 - 111/69)  BP(mean): 67 (21 Jul 2023 06:00) (62 - 83)  ABP: --  ABP(mean): --  RR: 25 (21 Jul 2023 06:00) (14 - 29)  SpO2: 97% (21 Jul 2023 06:00) (95% - 100%)        Adult Advanced Hemodynamics Last 24 Hrs  CVP(mm Hg): --  CVP(cm H2O): --  CO: --  CI: --  PA: --  PA(mean): --  PCWP: --  SVR: --  SVRI: --  PVR: --  PVRI: --  CAPILLARY BLOOD GLUCOSE      POCT Blood Glucose.: 138 mg/dL (21 Jul 2023 06:21)  POCT Blood Glucose.: 207 mg/dL (20 Jul 2023 21:23)  POCT Blood Glucose.: 125 mg/dL (20 Jul 2023 17:17)  POCT Blood Glucose.: 159 mg/dL (20 Jul 2023 11:39)  POCT Blood Glucose.: 159 mg/dL (20 Jul 2023 07:29)    CAPILLARY BLOOD GLUCOSE      POCT Blood Glucose.: 138 mg/dL (21 Jul 2023 06:21)    I&O's Summary    20 Jul 2023 07:01  -  21 Jul 2023 07:00  --------------------------------------------------------  IN: 646.2 mL / OUT: 1370 mL / NET: -723.8 mL      Daily     Daily     PHYSICAL EXAMINATION:  General: Comfortable, no acute distress, cooperative with exam.  HEENT: PERRLA, EOMI, moist mucous membranes.  Respiratory: CTAB, normal respiratory effort, no coughing, wheezes, crackles, or rales.  CV: RRR, S1S2, no murmurs, rubs or gallops. No JVD. Distal pulses intact.  Abdominal: Soft, nontender, nondistended, no rebound or guarding, normal bowel sounds.  Neurology: AOx3, no focal neuro defects, ANDRES x 4.  Extremities: No pitting edema, + Peripheral pulses.  Incisions:   Tubes:    LABS:                          10.9   6.53  )-----------( 350      ( 20 Jul 2023 06:15 )             34.7     07-20    126<L>  |  87<L>  |  17  ----------------------------<  143<H>  4.4   |  31  |  0.74    Ca    10.2      20 Jul 2023 06:15  Phos  3.4     07-20  Mg     2.10     07-20    TPro  6.9  /  Alb  3.5  /  TBili  2.0<H>  /  DBili  x   /  AST  34<H>  /  ALT  42<H>  /  AlkPhos  145<H>  07-20    LIVER FUNCTIONS - ( 20 Jul 2023 06:15 )  Alb: 3.5 g/dL / Pro: 6.9 g/dL / ALK PHOS: 145 U/L / ALT: 42 U/L / AST: 34 U/L / GGT: x           PT/INR - ( 20 Jul 2023 06:15 )   PT: 14.9 sec;   INR: 1.28 ratio         PTT - ( 20 Jul 2023 14:00 )  PTT:53.6 sec        Urinalysis Basic - ( 20 Jul 2023 06:15 )    Color: x / Appearance: x / SG: x / pH: x  Gluc: 143 mg/dL / Ketone: x  / Bili: x / Urobili: x   Blood: x / Protein: x / Nitrite: x   Leuk Esterase: x / RBC: x / WBC x   Sq Epi: x / Non Sq Epi: x / Bacteria: x        TELEMETRY:     EKG:     IMAGING:   TTE with Doppler (w/Cont) (07.19.23 @ 14:44)  PROCEDURE: Transthoracic echocardiogram with 2-D, M-Mode  and complete spectral and color flow Doppler.  Intravenous ultrasound enhancing agent was administered for  improved left ventricular endocardial border definition.  Following the intravenous injection of ultrasound enhancing  agent, harmonic imaging was performed.  INDICATION: Heart failure, unspecified (I50.9)  ------------------------------------------------------------------------  DIMENSIONS:  Dimensions:     Normal Values:  LA:     5.5 cm    2.0 - 4.0 cm  Ao:     3.2 cm    2.0 - 3.8 cm  SEPTUM: 0.7 cm    0.6 - 1.2 cm  PWT:0.8 cm    0.6 - 1.1 cm  LVIDd:  7.8 cm    3.0 - 5.6 cm  LVIDs:  6.7 cm    1.8 - 4.0 cm  Derived Variables:  LVMI: 140 g/m2  RWT: 0.20  Fractional short: 14 %  Ejection Fraction (Modified Coffman Rule): 13 %  ------------------------------------------------------------------------  OBSERVATIONS:  Mitral Valve: Normal mitral valve. Severe  mitral  regurgitation with a vena contracta measured: 0.94 cm.  Aortic Root: Normal aortic root.  Aortic Valve: Calcified trileaflet aortic valve with normal  opening. Minimal aortic regurgitation.  Left Atrium: Severely dilated left atrium.  LA volume index  = 51 cc/m2.  Left Ventricle: Severe global left ventricular systolic  dysfunction. Severe global hypokinesis. LVEF calculated  using biplane Coffman's method is 13%. No left ventricular  thrombus. Severe left ventricular enlargement. Unable to  determine the severity of diastolic function due to severe  mitral regurgitation. Increased E/e'  is consistent with  elevated left ventricular filling pressure.  Right Heart: Normal right atrium. Normal right ventricular  size and function. A device wire is noted in the right  heart. Normal tricuspid valve.   Moderate tricuspid  regurgitation. Normal pulmonic valve.  Pericardium/PleuraNo pericardial effusion seen.  Hemodynamic: Estimated right ventricular systolic pressure  equals 58 mm Hg, assuming right atrial pressure equals 10  mm Hg, consistent with moderate pulmonary hypertension.  ------------------------------------------------------------------------  CONCLUSIONS:  1. Calcified trileaflet aortic valve with normal opening.  Minimal aortic regurgitation.  2. Severe left ventricular enlargement.  3. Severe global left ventricular systolic dysfunction.  Severe global hypokinesis. LVEF calculated using biplane  Coffman's method is 13%. No left ventricular thrombus.  4. Unable to determine the severity of diastolic function  due to severe mitral regurgitation. Increased E/e'  is  consistent with elevated left ventricular filling pressure.  5. Normal right atrium.  6. Normal right ventricular size and function. A device  wire is noted in the right heart.  7. Normal tricuspid valve.   Moderate tricuspid  regurgitation.  8. No pericardial effusion seen.  *** No previous Echo exam.    Xray Chest 1 View- PORTABLE-Urgent (Xray Chest 1 View- PORTABLE-Urgent .) (07.18.23 @ 22:46)  FINDINGS:  Support devices: Left chest wall AICD.  The cardiomediastinal silhouette is enlarged.  The lungs are clear.  There is no pneumothorax or pleural effusion.  No acute bony abnormality.    IMPRESSION:  Clear lungs.      --- End of Report --- KIRSTIN Tolentino  CCU Service  Cardiology   Spect: [id number] ([facility])     PATIENT: LARISSA HAHN, MRN: [id number]    CHIEF COMPLAINT: Patient is a 73y old  Female who presents with a chief complaint of Transferred for BIV upgrade (20 Jul 2023 11:50)      INTERVAL HISTORY/OVERNIGHT EVENTS: No overnight events. At bedside, patient has been sleeping and eating well. Denies N/V/D/C, abdominal pain, chest pain, SOB, or cough. Has been ambulating with assistance. Oriented to person, place, and time. Breathing comfortably on room air.    REVIEW OF SYSTEMS:    Constitutional:     [ ] negative [ ] fevers [ ] chills [ ] weight loss [ ] weight gain  HEENT:                  [ ] negative [ ] dry eyes [ ] eye irritation [ ] postnasal drip [ ] nasal congestion  CV:                         [ ] negative  [ ] chest pain [ ] orthopnea [ ] palpitations [ ] murmur  Resp:                     [ ] negative [ ] cough [ ] shortness of breath [ ] dyspnea [ ] wheezing [ ] sputum [ ] hemoptysis  GI:                          [ ] negative [ ] nausea [ ] vomiting [ ] diarrhea [ ] constipation [ ] abd pain [ ] dysphagia   :                        [ ] negative [ ] dysuria [ ] nocturia [ ] hematuria [ ] increased urinary frequency  Musculoskeletal: [ ] negative [ ] back pain [ ] myalgias [ ] arthralgias [ ] fracture  Skin:                       [ ] negative [ ] rash [ ] itch  Neurological:        [ ] negative [ ] headache [ ] dizziness [ ] syncope [ ] weakness [ ] numbness  Psychiatric:           [ ] negative [ ] anxiety [ ] depression  Endocrine:            [ ] negative [ ] diabetes [ ] thyroid problem  Heme/Lymph:      [ ] negative [ ] anemia [ ] bleeding problem  Allergic/Immune: [ ] negative [ ] itchy eyes [ ] nasal discharge [ ] hives [ ] angioedema    [ ] All other systems negative  [ ] Unable to assess ROS because ________.    MEDICATIONS:  MEDICATIONS  (STANDING):  chlorhexidine 2% Cloths 1 Application(s) Topical <User Schedule>  dextrose 5%. 1000 milliLiter(s) (50 mL/Hr) IV Continuous <Continuous>  dextrose 5%. 1000 milliLiter(s) (100 mL/Hr) IV Continuous <Continuous>  dextrose 50% Injectable 12.5 Gram(s) IV Push once  dextrose 50% Injectable 25 Gram(s) IV Push once  dextrose 50% Injectable 25 Gram(s) IV Push once  glucagon  Injectable 1 milliGRAM(s) IntraMuscular once  insulin lispro (ADMELOG) corrective regimen sliding scale   SubCutaneous three times a day before meals  insulin lispro (ADMELOG) corrective regimen sliding scale   SubCutaneous at bedtime  milrinone Infusion 0.375 MICROgram(s)/kG/Min (6.66 mL/Hr) IV Continuous <Continuous>  pantoprazole    Tablet 40 milliGRAM(s) Oral before breakfast  spironolactone 25 milliGRAM(s) Oral daily    MEDICATIONS  (PRN):  dextrose Oral Gel 15 Gram(s) Oral once PRN Blood Glucose LESS THAN 70 milliGRAM(s)/deciliter      ALLERGIES: Allergies  No Known Allergies      OBJECTIVE:  ICU Vital Signs Last 24 Hrs  T(C): 36.8 (21 Jul 2023 04:00), Max: 36.9 (21 Jul 2023 00:00)  T(F): 98.2 (21 Jul 2023 04:00), Max: 98.5 (21 Jul 2023 00:00)  HR: 90 (21 Jul 2023 06:00) (88 - 108)  BP: 98/58 (21 Jul 2023 06:00) (87/54 - 111/69)  BP(mean): 67 (21 Jul 2023 06:00) (62 - 83)  ABP: --  ABP(mean): --  RR: 25 (21 Jul 2023 06:00) (14 - 29)  SpO2: 97% (21 Jul 2023 06:00) (95% - 100%)          CAPILLARY BLOOD GLUCOSE      POCT Blood Glucose.: 138 mg/dL (21 Jul 2023 06:21)  POCT Blood Glucose.: 207 mg/dL (20 Jul 2023 21:23)  POCT Blood Glucose.: 125 mg/dL (20 Jul 2023 17:17)  POCT Blood Glucose.: 159 mg/dL (20 Jul 2023 11:39)  POCT Blood Glucose.: 159 mg/dL (20 Jul 2023 07:29)    CAPILLARY BLOOD GLUCOSE      POCT Blood Glucose.: 138 mg/dL (21 Jul 2023 06:21)    I&O's Summary    20 Jul 2023 07:01  -  21 Jul 2023 07:00  --------------------------------------------------------  IN: 646.2 mL / OUT: 1370 mL / NET: -723.8 mL      Daily       PHYSICAL EXAMINATION:  General: Comfortable, no acute distress, cooperative with exam.  HEENT: PERRLA, EOMI, moist mucous membranes.  Respiratory: CTAB, normal respiratory effort, no coughing, wheezes, crackles, or rales.  CV: RRR, S1S2, no murmurs, rubs or gallops. No JVD. Distal pulses intact.  Abdominal: Soft, nontender, nondistended, no rebound or guarding, normal bowel sounds.  Neurology: AOx3, no focal neuro defects, ANDRES x 4.  Extremities: No pitting edema, + Peripheral pulses.      LABS:                      10.9   6.53  )-----------( 350      ( 20 Jul 2023 06:15 )             34.7     07-20    126<L>  |  87<L>  |  17  ----------------------------<  143<H>  4.4   |  31  |  0.74    Ca    10.2      20 Jul 2023 06:15  Phos  3.4     07-20  Mg     2.10     07-20    TPro  6.9  /  Alb  3.5  /  TBili  2.0<H>  /  DBili  x   /  AST  34<H>  /  ALT  42<H>  /  AlkPhos  145<H>  07-20    LIVER FUNCTIONS - ( 20 Jul 2023 06:15 )  Alb: 3.5 g/dL / Pro: 6.9 g/dL / ALK PHOS: 145 U/L / ALT: 42 U/L / AST: 34 U/L / GGT: x           PT/INR - ( 20 Jul 2023 06:15 )   PT: 14.9 sec;   INR: 1.28 ratio         PTT - ( 20 Jul 2023 14:00 )  PTT:53.6 sec        Urinalysis Basic - ( 20 Jul 2023 06:15 )    Color: x / Appearance: x / SG: x / pH: x  Gluc: 143 mg/dL / Ketone: x  / Bili: x / Urobili: x   Blood: x / Protein: x / Nitrite: x   Leuk Esterase: x / RBC: x / WBC x   Sq Epi: x / Non Sq Epi: x / Bacteria: x        TELEMETRY:     EKG:     IMAGING:   TTE with Doppler (w/Cont) (07.19.23 @ 14:44)  PROCEDURE: Transthoracic echocardiogram with 2-D, M-Mode  and complete spectral and color flow Doppler.  Intravenous ultrasound enhancing agent was administered for  improved left ventricular endocardial border definition.  Following the intravenous injection of ultrasound enhancing  agent, harmonic imaging was performed.  INDICATION: Heart failure, unspecified (I50.9)  ------------------------------------------------------------------------  DIMENSIONS:  Dimensions:     Normal Values:  LA:     5.5 cm    2.0 - 4.0 cm  Ao:     3.2 cm    2.0 - 3.8 cm  SEPTUM: 0.7 cm    0.6 - 1.2 cm  PWT:0.8 cm    0.6 - 1.1 cm  LVIDd:  7.8 cm    3.0 - 5.6 cm  LVIDs:  6.7 cm    1.8 - 4.0 cm  Derived Variables:  LVMI: 140 g/m2  RWT: 0.20  Fractional short: 14 %  Ejection Fraction (Modified Coffman Rule): 13 %  ------------------------------------------------------------------------  OBSERVATIONS:  Mitral Valve: Normal mitral valve. Severe  mitral  regurgitation with a vena contracta measured: 0.94 cm.  Aortic Root: Normal aortic root.  Aortic Valve: Calcified trileaflet aortic valve with normal  opening. Minimal aortic regurgitation.  Left Atrium: Severely dilated left atrium.  LA volume index  = 51 cc/m2.  Left Ventricle: Severe global left ventricular systolic  dysfunction. Severe global hypokinesis. LVEF calculated  using biplane Coffman's method is 13%. No left ventricular  thrombus. Severe left ventricular enlargement. Unable to  determine the severity of diastolic function due to severe  mitral regurgitation. Increased E/e'  is consistent with  elevated left ventricular filling pressure.  Right Heart: Normal right atrium. Normal right ventricular  size and function. A device wire is noted in the right  heart. Normal tricuspid valve.   Moderate tricuspid  regurgitation. Normal pulmonic valve.  Pericardium/PleuraNo pericardial effusion seen.  Hemodynamic: Estimated right ventricular systolic pressure  equals 58 mm Hg, assuming right atrial pressure equals 10  mm Hg, consistent with moderate pulmonary hypertension.  ------------------------------------------------------------------------  CONCLUSIONS:  1. Calcified trileaflet aortic valve with normal opening.  Minimal aortic regurgitation.  2. Severe left ventricular enlargement.  3. Severe global left ventricular systolic dysfunction.  Severe global hypokinesis. LVEF calculated using biplane  Coffman's method is 13%. No left ventricular thrombus.  4. Unable to determine the severity of diastolic function  due to severe mitral regurgitation. Increased E/e'  is  consistent with elevated left ventricular filling pressure.  5. Normal right atrium.  6. Normal right ventricular size and function. A device  wire is noted in the right heart.  7. Normal tricuspid valve.   Moderate tricuspid  regurgitation.  8. No pericardial effusion seen.  *** No previous Echo exam.    Xray Chest 1 View- PORTABLE-Urgent (Xray Chest 1 View- PORTABLE-Urgent .) (07.18.23 @ 22:46)  FINDINGS:  Support devices: Left chest wall AICD.  The cardiomediastinal silhouette is enlarged.  The lungs are clear.  There is no pneumothorax or pleural effusion.  No acute bony abnormality.    IMPRESSION:  Clear lungs.      --- End of Report --- KIRSTIN Tolentino  CCU Service  Cardiology   Spect: 76117 ([facility])     PATIENT: LARISSA HAHN, MRN: [id number]    CHIEF COMPLAINT: Patient is a 73y old  Female who presents with a chief complaint of Transferred for BIV upgrade (20 Jul 2023 11:50)      INTERVAL HISTORY/OVERNIGHT EVENTS: No overnight events. At bedside, patient has been sleeping and eating well. Denies N/V/D/C, abdominal pain, chest pain, SOB, or cough. Has been ambulating with assistance. Oriented to person, place, and time. Breathing comfortably on room air.    REVIEW OF SYSTEMS:    Constitutional:     [ ] negative [ ] fevers [ ] chills [ ] weight loss [ ] weight gain  HEENT:                  [ ] negative [ ] dry eyes [ ] eye irritation [ ] postnasal drip [ ] nasal congestion  CV:                         [ ] negative  [ ] chest pain [ ] orthopnea [ ] palpitations [ ] murmur  Resp:                     [ ] negative [ ] cough [ ] shortness of breath [ ] dyspnea [ ] wheezing [ ] sputum [ ] hemoptysis  GI:                          [ ] negative [ ] nausea [ ] vomiting [ ] diarrhea [ ] constipation [ ] abd pain [ ] dysphagia   :                        [ ] negative [ ] dysuria [ ] nocturia [ ] hematuria [ ] increased urinary frequency  Musculoskeletal: [ ] negative [ ] back pain [ ] myalgias [ ] arthralgias [ ] fracture  Skin:                       [ ] negative [ ] rash [ ] itch  Neurological:        [ ] negative [ ] headache [ ] dizziness [ ] syncope [ ] weakness [ ] numbness  Psychiatric:           [ ] negative [ ] anxiety [ ] depression  Endocrine:            [ ] negative [ ] diabetes [ ] thyroid problem  Heme/Lymph:      [ ] negative [ ] anemia [ ] bleeding problem  Allergic/Immune: [ ] negative [ ] itchy eyes [ ] nasal discharge [ ] hives [ ] angioedema    [ ] All other systems negative  [ ] Unable to assess ROS because ________.    MEDICATIONS:  MEDICATIONS  (STANDING):  chlorhexidine 2% Cloths 1 Application(s) Topical <User Schedule>  dextrose 5%. 1000 milliLiter(s) (50 mL/Hr) IV Continuous <Continuous>  dextrose 5%. 1000 milliLiter(s) (100 mL/Hr) IV Continuous <Continuous>  dextrose 50% Injectable 12.5 Gram(s) IV Push once  dextrose 50% Injectable 25 Gram(s) IV Push once  dextrose 50% Injectable 25 Gram(s) IV Push once  glucagon  Injectable 1 milliGRAM(s) IntraMuscular once  insulin lispro (ADMELOG) corrective regimen sliding scale   SubCutaneous three times a day before meals  insulin lispro (ADMELOG) corrective regimen sliding scale   SubCutaneous at bedtime  milrinone Infusion 0.375 MICROgram(s)/kG/Min (6.66 mL/Hr) IV Continuous <Continuous>  pantoprazole    Tablet 40 milliGRAM(s) Oral before breakfast  spironolactone 25 milliGRAM(s) Oral daily    MEDICATIONS  (PRN):  dextrose Oral Gel 15 Gram(s) Oral once PRN Blood Glucose LESS THAN 70 milliGRAM(s)/deciliter      ALLERGIES: Allergies  No Known Allergies      OBJECTIVE:  ICU Vital Signs Last 24 Hrs  T(C): 36.8 (21 Jul 2023 04:00), Max: 36.9 (21 Jul 2023 00:00)  T(F): 98.2 (21 Jul 2023 04:00), Max: 98.5 (21 Jul 2023 00:00)  HR: 90 (21 Jul 2023 06:00) (88 - 108)  BP: 98/58 (21 Jul 2023 06:00) (87/54 - 111/69)  BP(mean): 67 (21 Jul 2023 06:00) (62 - 83)  ABP: --  ABP(mean): --  RR: 25 (21 Jul 2023 06:00) (14 - 29)  SpO2: 97% (21 Jul 2023 06:00) (95% - 100%)          CAPILLARY BLOOD GLUCOSE      POCT Blood Glucose.: 138 mg/dL (21 Jul 2023 06:21)  POCT Blood Glucose.: 207 mg/dL (20 Jul 2023 21:23)  POCT Blood Glucose.: 125 mg/dL (20 Jul 2023 17:17)  POCT Blood Glucose.: 159 mg/dL (20 Jul 2023 11:39)  POCT Blood Glucose.: 159 mg/dL (20 Jul 2023 07:29)    CAPILLARY BLOOD GLUCOSE      POCT Blood Glucose.: 138 mg/dL (21 Jul 2023 06:21)    I&O's Summary    20 Jul 2023 07:01  -  21 Jul 2023 07:00  --------------------------------------------------------  IN: 646.2 mL / OUT: 1370 mL / NET: -723.8 mL      Daily       PHYSICAL EXAMINATION:  General: Comfortable, no acute distress, cooperative with exam.  HEENT: PERRLA, EOMI, moist mucous membranes.  Respiratory: CTAB, normal respiratory effort, no coughing, wheezes, crackles, or rales.  CV: RRR, S1S2, no murmurs, rubs or gallops. No JVD. Distal pulses intact.  Abdominal: Soft, nontender, nondistended, no rebound or guarding, normal bowel sounds.  Neurology: AOx3, no focal neuro defects, ANDRES x 4.  Extremities: No pitting edema, + Peripheral pulses.      LABS:                      10.9   6.53  )-----------( 350      ( 20 Jul 2023 06:15 )             34.7     07-20    126<L>  |  87<L>  |  17  ----------------------------<  143<H>  4.4   |  31  |  0.74    Ca    10.2      20 Jul 2023 06:15  Phos  3.4     07-20  Mg     2.10     07-20    TPro  6.9  /  Alb  3.5  /  TBili  2.0<H>  /  DBili  x   /  AST  34<H>  /  ALT  42<H>  /  AlkPhos  145<H>  07-20    LIVER FUNCTIONS - ( 20 Jul 2023 06:15 )  Alb: 3.5 g/dL / Pro: 6.9 g/dL / ALK PHOS: 145 U/L / ALT: 42 U/L / AST: 34 U/L / GGT: x           PT/INR - ( 20 Jul 2023 06:15 )   PT: 14.9 sec;   INR: 1.28 ratio         PTT - ( 20 Jul 2023 14:00 )  PTT:53.6 sec        Urinalysis Basic - ( 20 Jul 2023 06:15 )    Color: x / Appearance: x / SG: x / pH: x  Gluc: 143 mg/dL / Ketone: x  / Bili: x / Urobili: x   Blood: x / Protein: x / Nitrite: x   Leuk Esterase: x / RBC: x / WBC x   Sq Epi: x / Non Sq Epi: x / Bacteria: x        TELEMETRY: V pacing, HR 90s    EKG:     IMAGING:   TTE with Doppler (w/Cont) (07.19.23 @ 14:44)  PROCEDURE: Transthoracic echocardiogram with 2-D, M-Mode  and complete spectral and color flow Doppler.  Intravenous ultrasound enhancing agent was administered for  improved left ventricular endocardial border definition.  Following the intravenous injection of ultrasound enhancing  agent, harmonic imaging was performed.  INDICATION: Heart failure, unspecified (I50.9)  ------------------------------------------------------------------------  DIMENSIONS:  Dimensions:     Normal Values:  LA:     5.5 cm    2.0 - 4.0 cm  Ao:     3.2 cm    2.0 - 3.8 cm  SEPTUM: 0.7 cm    0.6 - 1.2 cm  PWT:0.8 cm    0.6 - 1.1 cm  LVIDd:  7.8 cm    3.0 - 5.6 cm  LVIDs:  6.7 cm    1.8 - 4.0 cm  Derived Variables:  LVMI: 140 g/m2  RWT: 0.20  Fractional short: 14 %  Ejection Fraction (Modified Coffman Rule): 13 %  ------------------------------------------------------------------------  OBSERVATIONS:  Mitral Valve: Normal mitral valve. Severe  mitral  regurgitation with a vena contracta measured: 0.94 cm.  Aortic Root: Normal aortic root.  Aortic Valve: Calcified trileaflet aortic valve with normal  opening. Minimal aortic regurgitation.  Left Atrium: Severely dilated left atrium.  LA volume index  = 51 cc/m2.  Left Ventricle: Severe global left ventricular systolic  dysfunction. Severe global hypokinesis. LVEF calculated  using biplane Coffman's method is 13%. No left ventricular  thrombus. Severe left ventricular enlargement. Unable to  determine the severity of diastolic function due to severe  mitral regurgitation. Increased E/e'  is consistent with  elevated left ventricular filling pressure.  Right Heart: Normal right atrium. Normal right ventricular  size and function. A device wire is noted in the right  heart. Normal tricuspid valve.   Moderate tricuspid  regurgitation. Normal pulmonic valve.  Pericardium/PleuraNo pericardial effusion seen.  Hemodynamic: Estimated right ventricular systolic pressure  equals 58 mm Hg, assuming right atrial pressure equals 10  mm Hg, consistent with moderate pulmonary hypertension.  ------------------------------------------------------------------------  CONCLUSIONS:  1. Calcified trileaflet aortic valve with normal opening.  Minimal aortic regurgitation.  2. Severe left ventricular enlargement.  3. Severe global left ventricular systolic dysfunction.  Severe global hypokinesis. LVEF calculated using biplane  Coffman's method is 13%. No left ventricular thrombus.  4. Unable to determine the severity of diastolic function  due to severe mitral regurgitation. Increased E/e'  is  consistent with elevated left ventricular filling pressure.  5. Normal right atrium.  6. Normal right ventricular size and function. A device  wire is noted in the right heart.  7. Normal tricuspid valve.   Moderate tricuspid  regurgitation.  8. No pericardial effusion seen.  *** No previous Echo exam.    Xray Chest 1 View- PORTABLE-Urgent (Xray Chest 1 View- PORTABLE-Urgent .) (07.18.23 @ 22:46)  FINDINGS:  Support devices: Left chest wall AICD.  The cardiomediastinal silhouette is enlarged.  The lungs are clear.  There is no pneumothorax or pleural effusion.  No acute bony abnormality.    IMPRESSION:  Clear lungs.      --- End of Report --- KIRSTIN Tolentino  CCU Service  Cardiology   Spect: [id number] ([facility])     PATIENT: LARISSA HAHN, MRN: [id number]    CHIEF COMPLAINT: Patient is a 73y old  Female who presents with a chief complaint of Transferred for BIV upgrade (20 Jul 2023 11:50)      INTERVAL HISTORY/OVERNIGHT EVENTS: No overnight events. At bedside, patient has been sleeping and eating well. Denies N/V/D/C, abdominal pain, chest pain, SOB, or cough. Has been ambulating with assistance. Oriented to person, place, and time. Breathing comfortably on room air.    REVIEW OF SYSTEMS:    Constitutional:     [ ] negative [ ] fevers [ ] chills [ ] weight loss [ ] weight gain  HEENT:                  [ ] negative [ ] dry eyes [ ] eye irritation [ ] postnasal drip [ ] nasal congestion  CV:                         [ ] negative  [ ] chest pain [ ] orthopnea [ ] palpitations [ ] murmur  Resp:                     [ ] negative [ ] cough [ ] shortness of breath [ ] dyspnea [ ] wheezing [ ] sputum [ ] hemoptysis  GI:                          [ ] negative [ ] nausea [ ] vomiting [ ] diarrhea [ ] constipation [ ] abd pain [ ] dysphagia   :                        [ ] negative [ ] dysuria [ ] nocturia [ ] hematuria [ ] increased urinary frequency  Musculoskeletal: [ ] negative [ ] back pain [ ] myalgias [ ] arthralgias [ ] fracture  Skin:                       [ ] negative [ ] rash [ ] itch  Neurological:        [ ] negative [ ] headache [ ] dizziness [ ] syncope [ ] weakness [ ] numbness  Psychiatric:           [ ] negative [ ] anxiety [ ] depression  Endocrine:            [ ] negative [ ] diabetes [ ] thyroid problem  Heme/Lymph:      [ ] negative [ ] anemia [ ] bleeding problem  Allergic/Immune: [ ] negative [ ] itchy eyes [ ] nasal discharge [ ] hives [ ] angioedema    [x] All other systems negative  [ ] Unable to assess ROS because ________.    MEDICATIONS:  MEDICATIONS  (STANDING):  chlorhexidine 2% Cloths 1 Application(s) Topical <User Schedule>  dextrose 5%. 1000 milliLiter(s) (50 mL/Hr) IV Continuous <Continuous>  dextrose 5%. 1000 milliLiter(s) (100 mL/Hr) IV Continuous <Continuous>  dextrose 50% Injectable 12.5 Gram(s) IV Push once  dextrose 50% Injectable 25 Gram(s) IV Push once  dextrose 50% Injectable 25 Gram(s) IV Push once  glucagon  Injectable 1 milliGRAM(s) IntraMuscular once  insulin lispro (ADMELOG) corrective regimen sliding scale   SubCutaneous three times a day before meals  insulin lispro (ADMELOG) corrective regimen sliding scale   SubCutaneous at bedtime  milrinone Infusion 0.375 MICROgram(s)/kG/Min (6.66 mL/Hr) IV Continuous <Continuous>  pantoprazole    Tablet 40 milliGRAM(s) Oral before breakfast  spironolactone 25 milliGRAM(s) Oral daily    MEDICATIONS  (PRN):  dextrose Oral Gel 15 Gram(s) Oral once PRN Blood Glucose LESS THAN 70 milliGRAM(s)/deciliter      ALLERGIES: Allergies  No Known Allergies      OBJECTIVE:  ICU Vital Signs Last 24 Hrs  T(C): 36.8 (21 Jul 2023 04:00), Max: 36.9 (21 Jul 2023 00:00)  T(F): 98.2 (21 Jul 2023 04:00), Max: 98.5 (21 Jul 2023 00:00)  HR: 90 (21 Jul 2023 06:00) (88 - 108)  BP: 98/58 (21 Jul 2023 06:00) (87/54 - 111/69)  BP(mean): 67 (21 Jul 2023 06:00) (62 - 83)  ABP: --  ABP(mean): --  RR: 25 (21 Jul 2023 06:00) (14 - 29)  SpO2: 97% (21 Jul 2023 06:00) (95% - 100%)          CAPILLARY BLOOD GLUCOSE      POCT Blood Glucose.: 138 mg/dL (21 Jul 2023 06:21)  POCT Blood Glucose.: 207 mg/dL (20 Jul 2023 21:23)  POCT Blood Glucose.: 125 mg/dL (20 Jul 2023 17:17)  POCT Blood Glucose.: 159 mg/dL (20 Jul 2023 11:39)  POCT Blood Glucose.: 159 mg/dL (20 Jul 2023 07:29)    CAPILLARY BLOOD GLUCOSE      POCT Blood Glucose.: 138 mg/dL (21 Jul 2023 06:21)    I&O's Summary    20 Jul 2023 07:01  -  21 Jul 2023 07:00  --------------------------------------------------------  IN: 646.2 mL / OUT: 1370 mL / NET: -723.8 mL      Daily       PHYSICAL EXAMINATION: Addendum 7/21 @1256- pt went for BIV ICD upgrade early AM   General: Comfortable, no acute distress, cooperative with exam.  HEENT: PERRLA, EOMI, moist mucous membranes.  Respiratory: CTAB, normal respiratory effort, no coughing, wheezes, crackles, or rales.  Skin: +pressure dressing for ICD, dressing clean, dry, intact.  CV: RRR, S1S2, no murmurs, rubs or gallops. No JVD. Distal pulses intact.  Abdominal: Soft, nontender, nondistended, no rebound or guarding, normal bowel sounds.  Neurology: AOx3, no focal neuro defects, ANDRES x 4.  Extremities: No pitting edema, + Peripheral pulses.      LABS:                      10.9   6.53  )-----------( 350      ( 20 Jul 2023 06:15 )             34.7     07-20    126<L>  |  87<L>  |  17  ----------------------------<  143<H>  4.4   |  31  |  0.74    Ca    10.2      20 Jul 2023 06:15  Phos  3.4     07-20  Mg     2.10     07-20    TPro  6.9  /  Alb  3.5  /  TBili  2.0<H>  /  DBili  x   /  AST  34<H>  /  ALT  42<H>  /  AlkPhos  145<H>  07-20    LIVER FUNCTIONS - ( 20 Jul 2023 06:15 )  Alb: 3.5 g/dL / Pro: 6.9 g/dL / ALK PHOS: 145 U/L / ALT: 42 U/L / AST: 34 U/L / GGT: x           PT/INR - ( 20 Jul 2023 06:15 )   PT: 14.9 sec;   INR: 1.28 ratio         PTT - ( 20 Jul 2023 14:00 )  PTT:53.6 sec        Urinalysis Basic - ( 20 Jul 2023 06:15 )    Color: x / Appearance: x / SG: x / pH: x  Gluc: 143 mg/dL / Ketone: x  / Bili: x / Urobili: x   Blood: x / Protein: x / Nitrite: x   Leuk Esterase: x / RBC: x / WBC x   Sq Epi: x / Non Sq Epi: x / Bacteria: x        TELEMETRY: V pacing, HR 90s    EKG:     IMAGING:   TTE with Doppler (w/Cont) (07.19.23 @ 14:44)  PROCEDURE: Transthoracic echocardiogram with 2-D, M-Mode  and complete spectral and color flow Doppler.  Intravenous ultrasound enhancing agent was administered for  improved left ventricular endocardial border definition.  Following the intravenous injection of ultrasound enhancing  agent, harmonic imaging was performed.  INDICATION: Heart failure, unspecified (I50.9)  ------------------------------------------------------------------------  DIMENSIONS:  Dimensions:     Normal Values:  LA:     5.5 cm    2.0 - 4.0 cm  Ao:     3.2 cm    2.0 - 3.8 cm  SEPTUM: 0.7 cm    0.6 - 1.2 cm  PWT:0.8 cm    0.6 - 1.1 cm  LVIDd:  7.8 cm    3.0 - 5.6 cm  LVIDs:  6.7 cm    1.8 - 4.0 cm  Derived Variables:  LVMI: 140 g/m2  RWT: 0.20  Fractional short: 14 %  Ejection Fraction (Modified Coffman Rule): 13 %  ------------------------------------------------------------------------  OBSERVATIONS:  Mitral Valve: Normal mitral valve. Severe  mitral  regurgitation with a vena contracta measured: 0.94 cm.  Aortic Root: Normal aortic root.  Aortic Valve: Calcified trileaflet aortic valve with normal  opening. Minimal aortic regurgitation.  Left Atrium: Severely dilated left atrium.  LA volume index  = 51 cc/m2.  Left Ventricle: Severe global left ventricular systolic  dysfunction. Severe global hypokinesis. LVEF calculated  using biplane Coffman's method is 13%. No left ventricular  thrombus. Severe left ventricular enlargement. Unable to  determine the severity of diastolic function due to severe  mitral regurgitation. Increased E/e'  is consistent with  elevated left ventricular filling pressure.  Right Heart: Normal right atrium. Normal right ventricular  size and function. A device wire is noted in the right  heart. Normal tricuspid valve.   Moderate tricuspid  regurgitation. Normal pulmonic valve.  Pericardium/PleuraNo pericardial effusion seen.  Hemodynamic: Estimated right ventricular systolic pressure  equals 58 mm Hg, assuming right atrial pressure equals 10  mm Hg, consistent with moderate pulmonary hypertension.  ------------------------------------------------------------------------  CONCLUSIONS:  1. Calcified trileaflet aortic valve with normal opening.  Minimal aortic regurgitation.  2. Severe left ventricular enlargement.  3. Severe global left ventricular systolic dysfunction.  Severe global hypokinesis. LVEF calculated using biplane  Coffman's method is 13%. No left ventricular thrombus.  4. Unable to determine the severity of diastolic function  due to severe mitral regurgitation. Increased E/e'  is  consistent with elevated left ventricular filling pressure.  5. Normal right atrium.  6. Normal right ventricular size and function. A device  wire is noted in the right heart.  7. Normal tricuspid valve.   Moderate tricuspid  regurgitation.  8. No pericardial effusion seen.  *** No previous Echo exam.    Xray Chest 1 View- PORTABLE-Urgent (Xray Chest 1 View- PORTABLE-Urgent .) (07.18.23 @ 22:46)  FINDINGS:  Support devices: Left chest wall AICD.  The cardiomediastinal silhouette is enlarged.  The lungs are clear.  There is no pneumothorax or pleural effusion.  No acute bony abnormality.    IMPRESSION:  Clear lungs.      --- End of Report ---

## 2023-07-21 NOTE — PROGRESS NOTE ADULT - SUBJECTIVE AND OBJECTIVE BOX
Patient s/p upgrade to BiV-ICD. Tolerated the procedure well. No complications.   Vital signs stable. Tele: biventricular pacing.  Family at bedside for Polish translation.       Vital Signs Last 24 Hrs  T(C): 36.7 (21 Jul 2023 11:55), Max: 36.9 (21 Jul 2023 00:00)  T(F): 98.1 (21 Jul 2023 11:55), Max: 98.5 (21 Jul 2023 00:00)  HR: 79 (21 Jul 2023 12:45) (79 - 108)  BP: 97/53 (21 Jul 2023 12:45) (87/54 - 115/61)  BP(mean): 64 (21 Jul 2023 12:45) (62 - 79)  RR: 18 (21 Jul 2023 12:45) (14 - 29)  SpO2: 97% (21 Jul 2023 12:45) (95% - 100%)    Parameters below as of 21 Jul 2023 12:45  Patient On (Oxygen Delivery Method): nasal cannula  O2 Flow (L/min): 2    EKG: post implant EKG pending  Telemetry: Sinus rhythm with biventricular pacing.    MEDICATIONS  (STANDING):  ceFAZolin   IVPB 1000 milliGRAM(s) IV Intermittent every 8 hours  chlorhexidine 2% Cloths 1 Application(s) Topical <User Schedule>  dextrose 5%. 1000 milliLiter(s) (100 mL/Hr) IV Continuous <Continuous>  dextrose 5%. 1000 milliLiter(s) (50 mL/Hr) IV Continuous <Continuous>  dextrose 50% Injectable 25 Gram(s) IV Push once  dextrose 50% Injectable 25 Gram(s) IV Push once  dextrose 50% Injectable 12.5 Gram(s) IV Push once  glucagon  Injectable 1 milliGRAM(s) IntraMuscular once  insulin lispro (ADMELOG) corrective regimen sliding scale   SubCutaneous three times a day before meals  insulin lispro (ADMELOG) corrective regimen sliding scale   SubCutaneous at bedtime  milrinone Infusion 0.375 MICROgram(s)/kG/Min (6.66 mL/Hr) IV Continuous <Continuous>  pantoprazole    Tablet 40 milliGRAM(s) Oral before breakfast  spironolactone 25 milliGRAM(s) Oral daily    MEDICATIONS  (PRN):  dextrose Oral Gel 15 Gram(s) Oral once PRN Blood Glucose LESS THAN 70 milliGRAM(s)/deciliter          Physical exam:   Gen- patient starting to wake up from anesthesia. No complaints of pain at the ICD site.   Resp- fine crackles at bases. No wheezing.   CV- S1 and S2 RRR. No gallops or rubs. +murmurs.  Device site with pressure dressing. NO evidence of swelling or bleeding.   ABD- soft nontender + bowel sounds  EXT- no edema no calf tenderness.   Neuro- grossly nonfocal                            10.1   6.77  )-----------( 327      ( 21 Jul 2023 05:30 )             31.1     PT/INR - ( 21 Jul 2023 05:30 )   PT: 13.2 sec;   INR: 1.14 ratio         PTT - ( 21 Jul 2023 05:30 )  PTT:24.5 sec  07-21    123<L>  |  86<L>  |  22  ----------------------------<  132<H>  3.9   |  24  |  0.83    Ca    9.6      21 Jul 2023 05:30  Phos  3.8     07-21  Mg     1.70     07-21    TPro  6.4  /  Alb  3.5  /  TBili  1.6<H>  /  DBili  x   /  AST  29  /  ALT  35<H>  /  AlkPhos  141<H>  07-21        ECHOCARDIOGRAM;  Patient name: LARISSA HAHN  YOB: 1950   Age: 73 (F)   MR#: [id number]  Study Date: 7/19/2023  [facility]  Study quality: Technically good  Referring Physician: Landen Galeano MD  Blood Pressure: 99/64 mmHg  Height: 211 cm  Weight: 59 kg  BSA: 2 m2  Heart Rate: 97 mmHg  ------------------------------------------------------------------------  PROCEDURE: Transthoracic echocardiogram with 2-D, M-Mode  and complete spectral and color flow Doppler.  Intravenous ultrasound enhancing agent was administered for  improved left ventricular endocardial border definition.  Following the intravenous injection of ultrasound enhancing  agent, harmonic imaging was performed.  INDICATION: Heart failure, unspecified (I50.9)  ------------------------------------------------------------------------  DIMENSIONS:  Dimensions:     Normal Values:  LA:     5.5 cm    2.0 - 4.0 cm  Ao:     3.2 cm    2.0 - 3.8 cm  SEPTUM: 0.7 cm    0.6 - 1.2 cm  PWT:    0.8 cm    0.6 - 1.1 cm  LVIDd:  7.8 cm    3.0 - 5.6 cm  LVIDs:  6.7 cm    1.8 - 4.0 cm  Derived Variables:  LVMI: 140 g/m2  RWT: 0.20  Fractional short: 14 %  Ejection Fraction (Modified Coffman Rule): 13 %  ------------------------------------------------------------------------  OBSERVATIONS:  Mitral Valve: Normal mitral valve. Severe  mitral  regurgitation with a vena contracta measured: 0.94 cm.  Aortic Root: Normal aortic root.  Aortic Valve: Calcified trileaflet aortic valve with normal  opening. Minimal aortic regurgitation.  Left Atrium: Severely dilated left atrium.  LA volume index  = 51 cc/m2.  Left Ventricle: Severe global left ventricular systolic  dysfunction. Severe global hypokinesis. LVEF calculated  using biplane Coffman's method is 13%. No left ventricular  thrombus. Severe left ventricular enlargement. Unable to  determine the severity of diastolic function due to severe  mitral regurgitation. Increased E/e'  is consistent with  elevated left ventricular filling pressure.  Right Heart: Normal right atrium. Normal right ventricular  size and function. A device wire is noted in the right  heart. Normal tricuspid valve.   Moderate tricuspid  regurgitation. Normal pulmonic valve.  Pericardium/PleuraNo pericardial effusion seen.  Hemodynamic: Estimated right ventricular systolic pressure  equals 58 mm Hg, assuming right atrial pressure equals 10  mm Hg, consistent with moderate pulmonary hypertension.  ------------------------------------------------------------------------  CONCLUSIONS:  1. Calcified trileaflet aortic valve with normal opening.  Minimal aortic regurgitation.  2. Severe left ventricular enlargement.  3. Severe global left ventricular systolic dysfunction.  Severe global hypokinesis. LVEF calculated using biplane  Coffman's method is 13%. No left ventricular thrombus.  4. Unable to determine the severity of diastolic function  due to severe mitral regurgitation. Increased E/e'  is  consistent with elevated left ventricular filling pressure.  5. Normal right atrium.  6. Normal right ventricular size and function. A device  wire is noted in the right heart.  7. Normal tricuspid valve.   Moderate tricuspid  regurgitation.  8. No pericardial effusion seen.  *** No previous Echo exam.  Communicated results to: Landen Galeano MD through [employer]  messaging.  ------------------------------------------------------------------------  Confirmed on  7/19/2023 - 16:56:49 by Zully Calloway M.D.

## 2023-07-21 NOTE — PROGRESS NOTE ADULT - ASSESSMENT
73F w/ PMHx: P Afib (Eliquis) ICD Biotronik (placed at [place] 5/2023), NICM, HLD and chronic HFrEF transferred to [facility] CCU. She Initially presented to [facility] with acute decompensated HF. She was admitted to their CCU for milrinone gtt and Lasix gtt. Patient was transferred to [facility] CCU with Lasix drip at 10mg/hr, milrinone drip 0.375mcg/kg/min and heparin drip 950units/hr. EKG with LBBB. Echo demonstrated severe MR. NO LV thrombus, LVEF 13%.  Now s/p upgrade of ICD to BiV-ICD.    Post procedure BiV-ICD teaching done with patient's daughters.  (she lives with her daughter).   Written instructions and contact information provided.   She was given a home monitor with verbal and written instructions.   Pressure dressing to be removed in am.   Continue GDMT.              73F w/ PMHx: P Afib (Eliquis) ICD Biotronik (placed at [place] 5/2023), NICM, HLD and chronic HFrEF transferred to [facility] CCU. She Initially presented to [facility] with acute decompensated HF. She was admitted to their CCU for milrinone gtt and Lasix gtt. Patient was transferred to [facility] CCU with Lasix drip at 10mg/hr, milrinone drip 0.375mcg/kg/min and heparin drip 950units/hr. EKG with LBBB. Echo demonstrated severe MR. NO LV thrombus, LVEF 13%.  Now s/p upgrade of ICD to BiV-ICD. CXR without pneumothorax.     Post procedure BiV-ICD teaching done with patient's daughters. ( lives with her daughter).   Written instructions and contact information provided.   She was given a home monitor with verbal and written instructions.   Pressure dressing to be removed in am.   Continue GDMT.   Discontinue heparin gtt.  Eliquis to be restarted on 7/25/23.

## 2023-07-21 NOTE — PROGRESS NOTE ADULT - ASSESSMENT
73F w/ PMHx:  Afib (Eliquis) ICD (placed at [place] 5/2023), NICM, HLD and HFrEF transferred to [facility] CCU. Initially presented to [facility] with c/o SOB, found to have volume overload, admitted to their CCU for Primacor gtt and Lasix gtt. Patient transferred with Lasix drip at 10mg/hr, milrinone drip 0.375mcg/kg/min and heparin drip infusing at 950units/hr admitted to CCU for BIV upgrade.     NEUROLOGIC:  #AOx3, no active issues, Lithuanian speaking    RESPIRATORY:  #Satting well on room air, no active issues    CARDIOVASCULAR:  #Acute on chronic systolic heart failure   - Not volume overloaded on exam and no JVD noted  - Echo at [facility] on 7/11 shows EF 10-15%, severe MR, severe TR, +LV thrombus  - TTE this admission- EF 13%, no LV thrombus, elevated LV filling pressure  - Continue milrinone drip at 0.375 mcg/kg/min,   - s/p diuresis with Lasix gtt, Lasix 60 mg IVP BID d/cd  - Continue Aldactone 25mg daily   - Last 24 hrs: .8 mL,/ OUT: 1550 mL / NET: -936.2 mL  - Lactate 1.2 -> 2.4 -> 2.1  - Monitor I&Os and daily standing weights   - BIV upgrade this AM 7/21    #LV thrombus   - History of LV thrombus from [facility] admission  - TTE this admission shows no evidence of LV thrombus  - Continue heparin gtt for Afib    #Afib  - CHADS-VASc 3  - Switched home Eliquis to coumadin, on Heparin gtt      GASTROINTESTINAL:  #DASH/TLC diet  #NPO overnight for BIV today 7/21  #Continue PPI    /RENAL:  #Hyponatremia this admission  - Na lowest overnight at 122, repeat 126  - Continue to monitor CMP  - Lisa, strict I&Os     ENDOCRINE:  #T2DM  - A1C 6.9  - ISS    DVT PPX:  #Heparin gtt, off this AM for BIV upgrade 73F w/ PMHx:  Afib (Eliquis) ICD (placed at [place] 5/2023), NICM, HLD and HFrEF transferred to [facility] CCU. Initially presented to [facility] with c/o SOB, found to have volume overload, admitted to their CCU for Primacor gtt and Lasix gtt. Patient transferred with Lasix drip at 10mg/hr, milrinone drip 0.375mcg/kg/min and heparin drip infusing at 950units/hr admitted to CCU for BIV upgrade.     NEUROLOGIC:  #AOx3, no active issues, Belarusian speaking    RESPIRATORY:  #Satting well on room air, no active issues    CARDIOVASCULAR:  #Acute on chronic systolic heart failure   - Not volume overloaded on exam and no JVD noted  - Echo at [facility] on 7/11 shows EF 10-15%, severe MR, severe TR, +LV thrombus  - TTE this admission- EF 13%, no LV thrombus, elevated LV filling pressure  - Continue milrinone drip at 0.375 mcg/kg/min, if unable to wean milrinone, consider ?HF eval  - s/p diuresis with Lasix gtt, Lasix 60 mg IVP BID d/cd  - Continue Aldactone 25mg daily   - Last 24 hrs: .2 mL,/ OUT: 1370 mL / NET: -723.8 mL  - Lactate 1.2 -> 2.4 -> 2.1  - Monitor I&Os and daily standing weights   - BIV upgrade this AM 7/21    #LV thrombus   - History of LV thrombus from [facility] admission  - TTE this admission shows no evidence of LV thrombus  - Continue heparin gtt for Afib    #Afib  - CHADS-VASc 3  - Switched home Eliquis to coumadin, on Heparin gtt      GASTROINTESTINAL:  #DASH/TLC diet  #NPO overnight for BIV today 7/21  #Continue PPI    /RENAL:  #Hyponatremia this admission  - Na lowest overnight at 122, repeat 126  - Continue to monitor CMP  - celeste Lisa I&Os     ENDOCRINE:  #T2DM  - A1C 6.9  - ISS    DVT PPX:  #Heparin gtt, off this AM for BIV upgrade 73F w/ PMHx:  Afib (Eliquis) ICD (placed at [place] 5/2023), NICM, HLD and HFrEF transferred to [facility] CCU. Initially presented to [facility] with c/o SOB, found to have volume overload, admitted to their CCU for Primacor gtt and Lasix gtt. Patient transferred with Lasix drip at 10mg/hr, milrinone drip 0.375mcg/kg/min and heparin drip infusing at 950units/hr admitted to CCU for BIV upgrade.     NEUROLOGIC:  #AOx3, no active issues, Thai speaking    RESPIRATORY:  #Satting well on room air, no active issues    CARDIOVASCULAR:  #Acute on chronic systolic heart failure   - Not volume overloaded on exam and no JVD noted  - Echo at [facility] on 7/11 shows EF 10-15%, severe MR, severe TR, +LV thrombus  - TTE this admission- EF 13%, no LV thrombus, elevated LV filling pressure  - Continue milrinone drip at 0.375 mcg/kg/min, if unable to wean milrinone, consider ?HF eval  - s/p diuresis with Lasix gtt, Lasix 60 mg IVP BID d/cd  - Continue Aldactone 25mg daily   - Last 24 hrs: .2 mL,/ OUT: 1370 mL / NET: -723.8 mL  - Lactate 1.2 -> 2.4 -> 2.1  - Monitor I&Os and daily standing weights   - BIV upgrade this AM 7/21    #LV thrombus   - History of LV thrombus from [facility] admission  - TTE this admission shows no evidence of LV thrombus  - Continue heparin gtt for Afib    #Afib  - CHADS-VASc 3  - Switched home Eliquis to coumadin, on Heparin gtt      GASTROINTESTINAL:  #DASH/TLC diet  #NPO overnight for BIV today 7/21  #Continue PPI    /RENAL:  #Hyponatremia this admission  - Na lowest overnight at 122 -> 126 -> 123 this AM  - Continue to monitor CMP  - celeste Lisa I&Os     ENDOCRINE:  #T2DM  - A1C 6.9  - ISS    DVT PPX:  #Heparin gtt, off this AM for BIV upgrade

## 2023-07-21 NOTE — PROGRESS NOTE ADULT - CRITICAL CARE ATTENDING COMMENT
73 year old woman with AFIB on Eliquis, ICD placed in May 2023 at [facility], HFrEF (NICM) who was initially admitted to [facility] with dyspnea and fluid overload. Placed in CCU at [facility] and started on Lasix gtt with addition of Milrinone in setting of acute on chronic systolic heart failure. She was transferred to CCU for possible BiVICD upgrade.  TTE at [facility] showed Severe LVD with LV thrombus, severe MR/severe TR    TTE 7/19/23  CONCLUSIONS:  1. Calcified trileaflet aortic valve with normal opening.  Minimal aortic regurgitation.  2. Severe left ventricular enlargement.  3. Severe global left ventricular systolic dysfunction.  Severe global hypokinesis. LVEF calculated using biplane  Coffman's method is 13%. No left ventricular thrombus.  4. Unable to determine the severity of diastolic function  due to severe mitral regurgitation. Increased E/e'  is  consistent with elevated left ventricular filling pressure.  5. Normal right atrium.  6. Normal right ventricular size and function. A device  wire is noted in the right heart.  7. Normal tricuspid valve.   Moderate tricuspid  regurgitation.  8. No pericardial effusion seen.  *** No previous Echo exam.    Meds:  Milrinone 0.375 mcg/kg/min  Aldactone 25 mg daily    #Neuro- Kittitian speaking, no active issues  #Pulm-Diuresed with lasix  Continue to monitor  #CV- Acute on chronic systolic heart failure  Continue milrinone  Recheck lactate 1-2 PM  Aldactone  Today - BiVICD  #Renal- Cr stable, continue to monitor  #ID- no active issues  #DVT ppx - SCD

## 2023-07-22 LAB
ALBUMIN SERPL ELPH-MCNC: 3.4 G/DL — SIGNIFICANT CHANGE UP (ref 3.3–5)
ALP SERPL-CCNC: 137 U/L — HIGH (ref 40–120)
ALT FLD-CCNC: 26 U/L — SIGNIFICANT CHANGE UP (ref 4–33)
ANION GAP SERPL CALC-SCNC: 12 MMOL/L — SIGNIFICANT CHANGE UP (ref 7–14)
AST SERPL-CCNC: 27 U/L — SIGNIFICANT CHANGE UP (ref 4–32)
BASE EXCESS BLDV CALC-SCNC: 2.6 MMOL/L — SIGNIFICANT CHANGE UP (ref -2–3)
BASE EXCESS BLDV CALC-SCNC: 5.6 MMOL/L — HIGH (ref -2–3)
BASE EXCESS BLDV CALC-SCNC: 5.7 MMOL/L — HIGH (ref -2–3)
BILIRUB SERPL-MCNC: 1.4 MG/DL — HIGH (ref 0.2–1.2)
BLOOD GAS VENOUS COMPREHENSIVE RESULT: SIGNIFICANT CHANGE UP
BUN SERPL-MCNC: 25 MG/DL — HIGH (ref 7–23)
CALCIUM SERPL-MCNC: 9.5 MG/DL — SIGNIFICANT CHANGE UP (ref 8.4–10.5)
CHLORIDE BLDV-SCNC: 87 MMOL/L — LOW (ref 96–108)
CHLORIDE BLDV-SCNC: 88 MMOL/L — LOW (ref 96–108)
CHLORIDE BLDV-SCNC: 89 MMOL/L — LOW (ref 96–108)
CHLORIDE SERPL-SCNC: 88 MMOL/L — LOW (ref 98–107)
CO2 BLDV-SCNC: 27.3 MMOL/L — HIGH (ref 22–26)
CO2 BLDV-SCNC: 31.1 MMOL/L — HIGH (ref 22–26)
CO2 BLDV-SCNC: 32 MMOL/L — HIGH (ref 22–26)
CO2 SERPL-SCNC: 26 MMOL/L — SIGNIFICANT CHANGE UP (ref 22–31)
CREAT SERPL-MCNC: 0.95 MG/DL — SIGNIFICANT CHANGE UP (ref 0.5–1.3)
EGFR: 63 ML/MIN/1.73M2 — SIGNIFICANT CHANGE UP
GAS PNL BLDV: 120 MMOL/L — CRITICAL LOW (ref 136–145)
GAS PNL BLDV: 122 MMOL/L — LOW (ref 136–145)
GAS PNL BLDV: 123 MMOL/L — LOW (ref 136–145)
GAS PNL BLDV: SIGNIFICANT CHANGE UP
GLUCOSE BLDC GLUCOMTR-MCNC: 117 MG/DL — HIGH (ref 70–99)
GLUCOSE BLDC GLUCOMTR-MCNC: 134 MG/DL — HIGH (ref 70–99)
GLUCOSE BLDC GLUCOMTR-MCNC: 166 MG/DL — HIGH (ref 70–99)
GLUCOSE BLDC GLUCOMTR-MCNC: 223 MG/DL — HIGH (ref 70–99)
GLUCOSE BLDV-MCNC: 132 MG/DL — HIGH (ref 70–99)
GLUCOSE BLDV-MCNC: 136 MG/DL — HIGH (ref 70–99)
GLUCOSE BLDV-MCNC: 138 MG/DL — HIGH (ref 70–99)
GLUCOSE SERPL-MCNC: 137 MG/DL — HIGH (ref 70–99)
HCO3 BLDV-SCNC: 26 MMOL/L — SIGNIFICANT CHANGE UP (ref 22–29)
HCO3 BLDV-SCNC: 30 MMOL/L — HIGH (ref 22–29)
HCO3 BLDV-SCNC: 31 MMOL/L — HIGH (ref 22–29)
HCT VFR BLD CALC: 30.8 % — LOW (ref 34.5–45)
HCT VFR BLDA CALC: 30 % — LOW (ref 34.5–46.5)
HCT VFR BLDA CALC: 32 % — LOW (ref 34.5–46.5)
HCT VFR BLDA CALC: 33 % — LOW (ref 34.5–46.5)
HGB BLD CALC-MCNC: 10.1 G/DL — LOW (ref 11.7–16.1)
HGB BLD CALC-MCNC: 10.5 G/DL — LOW (ref 11.7–16.1)
HGB BLD CALC-MCNC: 10.9 G/DL — LOW (ref 11.7–16.1)
HGB BLD-MCNC: 9.8 G/DL — LOW (ref 11.5–15.5)
LACTATE BLDV-MCNC: 1 MMOL/L — SIGNIFICANT CHANGE UP (ref 0.5–2)
LACTATE BLDV-MCNC: 1.4 MMOL/L — SIGNIFICANT CHANGE UP (ref 0.5–2)
LACTATE BLDV-MCNC: 2 MMOL/L — SIGNIFICANT CHANGE UP (ref 0.5–2)
MAGNESIUM SERPL-MCNC: 1.6 MG/DL — SIGNIFICANT CHANGE UP (ref 1.6–2.6)
MCHC RBC-ENTMCNC: 27 PG — SIGNIFICANT CHANGE UP (ref 27–34)
MCHC RBC-ENTMCNC: 31.8 GM/DL — LOW (ref 32–36)
MCV RBC AUTO: 84.8 FL — SIGNIFICANT CHANGE UP (ref 80–100)
NRBC # BLD: 0 /100 WBCS — SIGNIFICANT CHANGE UP (ref 0–0)
NRBC # FLD: 0 K/UL — SIGNIFICANT CHANGE UP (ref 0–0)
PCO2 BLDV: 36 MMHG — LOW (ref 39–52)
PCO2 BLDV: 41 MMHG — SIGNIFICANT CHANGE UP (ref 39–52)
PCO2 BLDV: 45 MMHG — SIGNIFICANT CHANGE UP (ref 39–52)
PH BLDV: 7.44 — HIGH (ref 7.32–7.43)
PH BLDV: 7.47 — HIGH (ref 7.32–7.43)
PH BLDV: 7.47 — HIGH (ref 7.32–7.43)
PHOSPHATE SERPL-MCNC: 5 MG/DL — HIGH (ref 2.5–4.5)
PLATELET # BLD AUTO: 282 K/UL — SIGNIFICANT CHANGE UP (ref 150–400)
PO2 BLDV: 41 MMHG — SIGNIFICANT CHANGE UP (ref 25–45)
PO2 BLDV: 44 MMHG — SIGNIFICANT CHANGE UP (ref 25–45)
PO2 BLDV: 72 MMHG — HIGH (ref 25–45)
POTASSIUM BLDV-SCNC: 3.9 MMOL/L — SIGNIFICANT CHANGE UP (ref 3.5–5.1)
POTASSIUM BLDV-SCNC: 4.2 MMOL/L — SIGNIFICANT CHANGE UP (ref 3.5–5.1)
POTASSIUM BLDV-SCNC: 4.3 MMOL/L — SIGNIFICANT CHANGE UP (ref 3.5–5.1)
POTASSIUM SERPL-MCNC: 4.5 MMOL/L — SIGNIFICANT CHANGE UP (ref 3.5–5.3)
POTASSIUM SERPL-SCNC: 4.5 MMOL/L — SIGNIFICANT CHANGE UP (ref 3.5–5.3)
PROT SERPL-MCNC: 6.2 G/DL — SIGNIFICANT CHANGE UP (ref 6–8.3)
RBC # BLD: 3.63 M/UL — LOW (ref 3.8–5.2)
RBC # FLD: 19.2 % — HIGH (ref 10.3–14.5)
SAO2 % BLDV: 62.8 % — LOW (ref 67–88)
SAO2 % BLDV: 71.2 % — SIGNIFICANT CHANGE UP (ref 67–88)
SAO2 % BLDV: 94.9 % — HIGH (ref 67–88)
SODIUM SERPL-SCNC: 126 MMOL/L — LOW (ref 135–145)
T CRUZI AB SER-ACNC: SIGNIFICANT CHANGE UP
WBC # BLD: 7.83 K/UL — SIGNIFICANT CHANGE UP (ref 3.8–10.5)
WBC # FLD AUTO: 7.83 K/UL — SIGNIFICANT CHANGE UP (ref 3.8–10.5)

## 2023-07-22 PROCEDURE — 99233 SBSQ HOSP IP/OBS HIGH 50: CPT

## 2023-07-22 RX ORDER — LOSARTAN POTASSIUM 100 MG/1
12.5 TABLET, FILM COATED ORAL DAILY
Refills: 0 | Status: DISCONTINUED | OUTPATIENT
Start: 2023-07-22 | End: 2023-07-23

## 2023-07-22 RX ORDER — MAGNESIUM SULFATE 500 MG/ML
1 VIAL (ML) INJECTION ONCE
Refills: 0 | Status: COMPLETED | OUTPATIENT
Start: 2023-07-22 | End: 2023-07-22

## 2023-07-22 RX ORDER — WARFARIN SODIUM 2.5 MG/1
2 TABLET ORAL ONCE
Refills: 0 | Status: COMPLETED | OUTPATIENT
Start: 2023-07-22 | End: 2023-07-22

## 2023-07-22 RX ORDER — MAGNESIUM SULFATE 500 MG/ML
2 VIAL (ML) INJECTION ONCE
Refills: 0 | Status: COMPLETED | OUTPATIENT
Start: 2023-07-22 | End: 2023-07-22

## 2023-07-22 RX ADMIN — PANTOPRAZOLE SODIUM 40 MILLIGRAM(S): 20 TABLET, DELAYED RELEASE ORAL at 08:17

## 2023-07-22 RX ADMIN — CHLORHEXIDINE GLUCONATE 1 APPLICATION(S): 213 SOLUTION TOPICAL at 05:30

## 2023-07-22 RX ADMIN — SPIRONOLACTONE 25 MILLIGRAM(S): 25 TABLET, FILM COATED ORAL at 05:34

## 2023-07-22 RX ADMIN — Medication 650 MILLIGRAM(S): at 17:41

## 2023-07-22 RX ADMIN — WARFARIN SODIUM 2 MILLIGRAM(S): 2.5 TABLET ORAL at 21:16

## 2023-07-22 RX ADMIN — MILRINONE LACTATE 2.22 MICROGRAM(S)/KG/MIN: 1 INJECTION, SOLUTION INTRAVENOUS at 12:00

## 2023-07-22 RX ADMIN — MILRINONE LACTATE 4.44 MICROGRAM(S)/KG/MIN: 1 INJECTION, SOLUTION INTRAVENOUS at 07:17

## 2023-07-22 RX ADMIN — LOSARTAN POTASSIUM 12.5 MILLIGRAM(S): 100 TABLET, FILM COATED ORAL at 09:15

## 2023-07-22 RX ADMIN — Medication 100 GRAM(S): at 06:26

## 2023-07-22 RX ADMIN — Medication 1: at 08:19

## 2023-07-22 RX ADMIN — Medication 25 GRAM(S): at 08:17

## 2023-07-22 RX ADMIN — Medication 0: at 21:16

## 2023-07-22 RX ADMIN — Medication 2: at 12:44

## 2023-07-22 NOTE — PROGRESS NOTE ADULT - SUBJECTIVE AND OBJECTIVE BOX
Patient seen and examined at bedside.    Overnight Events:     No AEON     Tele shows BiV pacing     Denies any chest pain, lightheadedness, dizziness, SOB          Current Meds:  acetaminophen     Tablet .. 650 milliGRAM(s) Oral every 6 hours PRN  chlorhexidine 2% Cloths 1 Application(s) Topical <User Schedule>  dextrose 5%. 1000 milliLiter(s) IV Continuous <Continuous>  dextrose 5%. 1000 milliLiter(s) IV Continuous <Continuous>  dextrose 50% Injectable 25 Gram(s) IV Push once  dextrose 50% Injectable 12.5 Gram(s) IV Push once  dextrose 50% Injectable 25 Gram(s) IV Push once  dextrose Oral Gel 15 Gram(s) Oral once PRN  glucagon  Injectable 1 milliGRAM(s) IntraMuscular once  insulin lispro (ADMELOG) corrective regimen sliding scale   SubCutaneous three times a day before meals  insulin lispro (ADMELOG) corrective regimen sliding scale   SubCutaneous at bedtime  losartan 12.5 milliGRAM(s) Oral daily  milrinone Infusion 0.25 MICROgram(s)/kG/Min IV Continuous <Continuous>  pantoprazole    Tablet 40 milliGRAM(s) Oral before breakfast  spironolactone 25 milliGRAM(s) Oral daily  warfarin 2 milliGRAM(s) Oral once      Vitals:  T(F): 98.3 (07-22), Max: 98.6 (07-22)  HR: 98 (07-22) (79 - 104)  BP: 99/58 (07-22) (92/54 - 115/61)  RR: 23 (07-22)  SpO2: 98% (07-22)  I&O's Summary    21 Jul 2023 07:01  -  22 Jul 2023 07:00  --------------------------------------------------------  IN: 392.6 mL / OUT: 1045 mL / NET: -652.4 mL    22 Jul 2023 07:01  -  22 Jul 2023 10:18  --------------------------------------------------------  IN: 238.8 mL / OUT: 60 mL / NET: 178.8 mL        Physical Exam:  Appearance: No acute distress; well appearing  Cardiovascular: RRR, S1, S2, no murmurs, rubs, or gallops; no edema; no JVD  Respiratory: Clear to auscultation bilaterally  Chest: Pressure dressing removed, wound site clean, no erythema   Neurologic: Non-focal  Psychiatry: AAOx3, mood & affect appropriate  Skin: No rashes, ecchymoses, or cyanosis                          9.8    7.83  )-----------( 282      ( 22 Jul 2023 04:36 )             30.8     07-22    126<L>  |  88<L>  |  25<H>  ----------------------------<  137<H>  4.5   |  26  |  0.95    Ca    9.5      22 Jul 2023 04:36  Phos  5.0     07-22  Mg     1.60     07-22    TPro  6.2  /  Alb  3.4  /  TBili  1.4<H>  /  DBili  x   /  AST  27  /  ALT  26  /  AlkPhos  137<H>  07-22    PT/INR - ( 21 Jul 2023 05:30 )   PT: 13.2 sec;   INR: 1.14 ratio         PTT - ( 21 Jul 2023 05:30 )  PTT:24.5 sec              New ECG(s):     Echo:    Stress Testing:     Cath:    Imaging:    Interpretation of Telemetry:

## 2023-07-22 NOTE — PROGRESS NOTE ADULT - SUBJECTIVE AND OBJECTIVE BOX
Matti Reed NP  CCU Service  Cardiology   Spect: [id number] ([facility])     PATIENT: LARISSA HAHN, MRN: [id number]    CHIEF COMPLAINT: Patient is a 73y old  Female who presents with a chief complaint of Transferred for BIV upgrade (2023 13:17)      INTERVAL HISTORY/OVERNIGHT EVENTS: No overnight events. Denies abdominal pain, chest pain or SOB, cough. Oriented to person, place, and time. Breathing comfortably on room air.    REVIEW OF SYSTEMS:    Constitutional:     [ ] negative [ ] fevers [ ] chills [ ] weight loss [ ] weight gain  HEENT:                  [ ] negative [ ] dry eyes [ ] eye irritation [ ] postnasal drip [ ] nasal congestion  CV:                         [ ] negative  [ ] chest pain [ ] orthopnea [ ] palpitations [ ] murmur  Resp:                     [ ] negative [ ] cough [ ] shortness of breath [ ] dyspnea [ ] wheezing [ ] sputum [ ] hemoptysis  GI:                          [ ] negative [ ] nausea [ ] vomiting [ ] diarrhea [ ] constipation [ ] abd pain [ ] dysphagia   :                        [ ] negative [ ] dysuria [ ] nocturia [ ] hematuria [ ] increased urinary frequency  Musculoskeletal: [ ] negative [ ] back pain [ ] myalgias [ ] arthralgias [ ] fracture  Skin:                       [ ] negative [ ] rash [ ] itch  Neurological:        [ ] negative [ ] headache [ ] dizziness [ ] syncope [ ] weakness [ ] numbness  Psychiatric:           [ ] negative [ ] anxiety [ ] depression  Endocrine:            [ ] negative [ ] diabetes [ ] thyroid problem  Heme/Lymph:      [ ] negative [ ] anemia [ ] bleeding problem  Allergic/Immune: [ ] negative [ ] itchy eyes [ ] nasal discharge [ ] hives [ ] angioedema    [x ] All other systems negative  [ ] Unable to assess ROS because ________.    MEDICATIONS:  MEDICATIONS  (STANDING):    insulin lispro (ADMELOG) corrective regimen sliding scale   SubCutaneous three times a day before meals  insulin lispro (ADMELOG) corrective regimen sliding scale   SubCutaneous at bedtime  magnesium sulfate  IVPB 2 Gram(s) IV Intermittent once  milrinone Infusion 0.25 MICROgram(s)/kG/Min (4.44 mL/Hr) IV Continuous <Continuous>  pantoprazole    Tablet 40 milliGRAM(s) Oral before breakfast  spironolactone 25 milliGRAM(s) Oral daily    MEDICATIONS  (PRN):  acetaminophen     Tablet .. 650 milliGRAM(s) Oral every 6 hours PRN Temp greater or equal to 38C (100.4F), Mild Pain (1 - 3)  dextrose Oral Gel 15 Gram(s) Oral once PRN Blood Glucose LESS THAN 70 milliGRAM(s)/deciliter      ALLERGIES: Allergies    No Known Allergies    Intolerances        OBJECTIVE:  ICU Vital Signs Last 24 Hrs  T(C): 37 (2023 04:00), Max: 37 (2023 04:00)  T(F): 98.6 (2023 04:00), Max: 98.6 (2023 04:00)  HR: 97 (2023 06:00) (79 - 104)  BP: 111/64 (2023 06:00) (92/54 - 115/61)  BP(mean): 76 (2023 06:00) (62 - 77)  RR: 20 (2023 06:00) (11 - 30)  SpO2: 94% (2023 06:00) (93% - 100%)    O2 Parameters below as of 2023 06:00  Patient On (Oxygen Delivery Method): room air    CAPILLARY BLOOD GLUCOSE      POCT Blood Glucose.: 169 mg/dL (2023 21:11)  POCT Blood Glucose.: 150 mg/dL (2023 16:19)  POCT Blood Glucose.: 171 mg/dL (2023 12:23)    CAPILLARY BLOOD GLUCOSE      POCT Blood Glucose.: 169 mg/dL (2023 21:11)    I&O's Summary    2023 07:01  -  2023 07:00  --------------------------------------------------------  IN: 388.2 mL / OUT: 970 mL / NET: -581.8 mL       Daily Weight in k.3 (2023 05:00)    PHYSICAL EXAMINATION:  General: Comfortable, no acute distress, cooperative with exam.  HEENT: Moist mucous membranes.  Respiratory: CTAB, normal respiratory effort, no coughing, wheezes, crackles, or rales.  CV: RRR, S1S2, no murmurs, rubs or gallops. No JVD. Distal pulses intact.  Abdominal: Soft, nontender, nondistended, no rebound or guarding, normal bowel sounds.  Neurology: AOx3, no focal neuro defects, ANDRES x 4.  Extremities: No pitting edema, + Peripheral pulses.  Biv site: No hematoma   Tubes:    LABS:                          9.8    7.83  )-----------( 282      ( 2023 04:36 )             30.8         126<L>  |  88<L>  |  25<H>  ----------------------------<  137<H>  4.5   |  26  |  0.95    Ca    9.5      2023 04:36  Phos  5.0       Mg     1.60         TPro  6.2  /  Alb  3.4  /  TBili  1.4<H>  /  DBili  x   /  AST  27  /  ALT  26  /  AlkPhos  137<H>      LIVER FUNCTIONS - ( 2023 04:36 )  Alb: 3.4 g/dL / Pro: 6.2 g/dL / ALK PHOS: 137 U/L / ALT: 26 U/L / AST: 27 U/L / GGT: x           PT/INR - ( 2023 05:30 )   PT: 13.2 sec;   INR: 1.14 ratio         PTT - ( 2023 05:30 )  PTT:24.5 sec        Urinalysis Basic - ( 2023 04:36 )    Color: x / Appearance: x / SG: x / pH: x  Gluc: 137 mg/dL / Ketone: x  / Bili: x / Urobili: x   Blood: x / Protein: x / Nitrite: x   Leuk Esterase: x / RBC: x / WBC x   Sq Epi: x / Non Sq Epi: x / Bacteria: x        TELEMETRY:     EKG:     IMAGING:     TTE with Doppler (w/Cont) (23 @ 14:44)    DIMENSIONS:  Dimensions:     Normal Values:  LA:     5.5 cm    2.0 - 4.0 cm  Ao:     3.2 cm    2.0 - 3.8 cm  SEPTUM: 0.7 cm    0.6 - 1.2 cm  PWT:0.8 cm    0.6 - 1.1 cm  LVIDd:  7.8 cm    3.0 - 5.6 cm  LVIDs:  6.7 cm    1.8 - 4.0 cm  Derived Variables:  LVMI: 140 g/m2  RWT: 0.20  Fractional short: 14 %  Ejection Fraction (Modified Coffman Rule): 13 %  ------------------------------------------------------------------------  OBSERVATIONS:  Mitral Valve: Normal mitral valve. Severe  mitral  regurgitation with a vena contracta measured: 0.94 cm.  Aortic Root: Normal aortic root.  Aortic Valve: Calcified trileaflet aortic valve with normal  opening. Minimal aortic regurgitation.  Left Atrium: Severely dilated left atrium.  LA volume index  = 51 cc/m2.  Left Ventricle: Severe global left ventricular systolic  dysfunction. Severe global hypokinesis. LVEF calculated  using biplane Coffman's method is 13%. No left ventricular  thrombus. Severe left ventricular enlargement. Unable to  determine the severity of diastolic function due to severe  mitral regurgitation. Increased E/e'  is consistent with  elevated left ventricular filling pressure.  Right Heart: Normal right atrium. Normal right ventricular  size and function. A device wire is noted in the right  heart. Normal tricuspid valve.   Moderate tricuspid  regurgitation. Normal pulmonic valve.  Pericardium/PleuraNo pericardial effusion seen.  Hemodynamic: Estimated right ventricular systolic pressure  equals 58 mm Hg, assuming right atrial pressure equals 10  mm Hg, consistent with moderate pulmonary hypertension.  ------------------------------------------------------------------------  CONCLUSIONS:  1. Calcified trileaflet aortic valve with normal opening.  Minimal aortic regurgitation.  2. Severe left ventricular enlargement.  3. Severe global left ventricular systolic dysfunction.  Severe global hypokinesis. LVEF calculated using biplane  Coffman's method is 13%. No left ventricular thrombus.  4. Unable to determine the severity of diastolic function  due to severe mitral regurgitation. Increased E/e'  is  consistent with elevated left ventricular filling pressure.  5. Normal right atrium.  6. Normal right ventricular size and function. A device  wire is noted in the right heart.  7. Normal tricuspid valve.   Moderate tricuspid  regurgitation.  8. No pericardial effusion seen.   Matti Reed NP  CCU Service  Cardiology   Spect: [id number] ([facility])     PATIENT: LARISSA HAHN, MRN: [id number]    CHIEF COMPLAINT: Patient is a 73y old  Female who presents with a chief complaint of Transferred for BIV upgrade (2023 13:17)      INTERVAL HISTORY/OVERNIGHT EVENTS: No overnight events. Denies abdominal pain, chest pain or SOB, cough. Oriented to person, place, and time. Breathing comfortably on room air.    REVIEW OF SYSTEMS:    Constitutional:     [ ] negative [ ] fevers [ ] chills [ ] weight loss [ ] weight gain  HEENT:                  [ ] negative [ ] dry eyes [ ] eye irritation [ ] postnasal drip [ ] nasal congestion  CV:                         [ ] negative  [ ] chest pain [ ] orthopnea [ ] palpitations [ ] murmur  Resp:                     [ ] negative [ ] cough [ ] shortness of breath [ ] dyspnea [ ] wheezing [ ] sputum [ ] hemoptysis  GI:                          [ ] negative [ ] nausea [ ] vomiting [ ] diarrhea [ ] constipation [ ] abd pain [ ] dysphagia   :                        [ ] negative [ ] dysuria [ ] nocturia [ ] hematuria [ ] increased urinary frequency  Musculoskeletal: [ ] negative [ ] back pain [ ] myalgias [ ] arthralgias [ ] fracture  Skin:                       [ ] negative [ ] rash [ ] itch  Neurological:        [ ] negative [ ] headache [ ] dizziness [ ] syncope [ ] weakness [ ] numbness  Psychiatric:           [ ] negative [ ] anxiety [ ] depression  Endocrine:            [ ] negative [ ] diabetes [ ] thyroid problem  Heme/Lymph:      [ ] negative [ ] anemia [ ] bleeding problem  Allergic/Immune: [ ] negative [ ] itchy eyes [ ] nasal discharge [ ] hives [ ] angioedema    [x ] All other systems negative  [ ] Unable to assess ROS because ________.    MEDICATIONS:  MEDICATIONS  (STANDING):    insulin lispro (ADMELOG) corrective regimen sliding scale   SubCutaneous three times a day before meals  insulin lispro (ADMELOG) corrective regimen sliding scale   SubCutaneous at bedtime  magnesium sulfate  IVPB 2 Gram(s) IV Intermittent once  milrinone Infusion 0.25 MICROgram(s)/kG/Min (4.44 mL/Hr) IV Continuous <Continuous>  pantoprazole    Tablet 40 milliGRAM(s) Oral before breakfast  spironolactone 25 milliGRAM(s) Oral daily    MEDICATIONS  (PRN):  acetaminophen     Tablet .. 650 milliGRAM(s) Oral every 6 hours PRN Temp greater or equal to 38C (100.4F), Mild Pain (1 - 3)  dextrose Oral Gel 15 Gram(s) Oral once PRN Blood Glucose LESS THAN 70 milliGRAM(s)/deciliter      ALLERGIES: Allergies    No Known Allergies    Intolerances        OBJECTIVE:  ICU Vital Signs Last 24 Hrs  T(C): 37 (2023 04:00), Max: 37 (2023 04:00)  T(F): 98.6 (2023 04:00), Max: 98.6 (2023 04:00)  HR: 97 (2023 06:00) (79 - 104)  BP: 111/64 (2023 06:00) (92/54 - 115/61)  BP(mean): 76 (2023 06:00) (62 - 77)  RR: 20 (2023 06:00) (11 - 30)  SpO2: 94% (2023 06:00) (93% - 100%)    O2 Parameters below as of 2023 06:00  Patient On (Oxygen Delivery Method): room air    CAPILLARY BLOOD GLUCOSE      POCT Blood Glucose.: 169 mg/dL (2023 21:11)  POCT Blood Glucose.: 150 mg/dL (2023 16:19)  POCT Blood Glucose.: 171 mg/dL (2023 12:23)    CAPILLARY BLOOD GLUCOSE      POCT Blood Glucose.: 169 mg/dL (2023 21:11)    I&O's Summary    2023 07:01  -  2023 07:00  --------------------------------------------------------  IN: 388.2 mL / OUT: 970 mL / NET: -581.8 mL       Daily Weight in k.3 (2023 05:00)    PHYSICAL EXAMINATION:  General: Comfortable, no acute distress, cooperative with exam.  HEENT: Moist mucous membranes.  Respiratory: CTAB, normal respiratory effort, no coughing, wheezes, crackles, or rales.  CV: RRR, S1S2, no murmurs, rubs or gallops. No JVD. Distal pulses intact.  Abdominal: Soft, nontender, nondistended, no rebound or guarding, normal bowel sounds.  Neurology: AOx3, no focal neuro defects, ANDRES x 4.  Extremities: No pitting edema, + Peripheral pulses.  Biv site: No hematoma   Tubes:    LABS:                          9.8    7.83  )-----------( 282      ( 2023 04:36 )             30.8         126<L>  |  88<L>  |  25<H>  ----------------------------<  137<H>  4.5   |  26  |  0.95    Ca    9.5      2023 04:36  Phos  5.0       Mg     1.60         TPro  6.2  /  Alb  3.4  /  TBili  1.4<H>  /  DBili  x   /  AST  27  /  ALT  26  /  AlkPhos  137<H>      LIVER FUNCTIONS - ( 2023 04:36 )  Alb: 3.4 g/dL / Pro: 6.2 g/dL / ALK PHOS: 137 U/L / ALT: 26 U/L / AST: 27 U/L / GGT: x           PT/INR - ( 2023 05:30 )   PT: 13.2 sec;   INR: 1.14 ratio         PTT - ( 2023 05:30 )  PTT:24.5 sec        Urinalysis Basic - ( 2023 04:36 )    Color: x / Appearance: x / SG: x / pH: x  Gluc: 137 mg/dL / Ketone: x  / Bili: x / Urobili: x   Blood: x / Protein: x / Nitrite: x   Leuk Esterase: x / RBC: x / WBC x   Sq Epi: x / Non Sq Epi: x / Bacteria: x        TELEMETRY: Pacing w/ NSVT    EKG:     IMAGING:     TTE with Doppler (w/Cont) (23 @ 14:44)    DIMENSIONS:  Dimensions:     Normal Values:  LA:     5.5 cm    2.0 - 4.0 cm  Ao:     3.2 cm    2.0 - 3.8 cm  SEPTUM: 0.7 cm    0.6 - 1.2 cm  PWT:0.8 cm    0.6 - 1.1 cm  LVIDd:  7.8 cm    3.0 - 5.6 cm  LVIDs:  6.7 cm    1.8 - 4.0 cm  Derived Variables:  LVMI: 140 g/m2  RWT: 0.20  Fractional short: 14 %  Ejection Fraction (Modified Coffman Rule): 13 %  ------------------------------------------------------------------------  OBSERVATIONS:  Mitral Valve: Normal mitral valve. Severe  mitral  regurgitation with a vena contracta measured: 0.94 cm.  Aortic Root: Normal aortic root.  Aortic Valve: Calcified trileaflet aortic valve with normal  opening. Minimal aortic regurgitation.  Left Atrium: Severely dilated left atrium.  LA volume index  = 51 cc/m2.  Left Ventricle: Severe global left ventricular systolic  dysfunction. Severe global hypokinesis. LVEF calculated  using biplane Coffman's method is 13%. No left ventricular  thrombus. Severe left ventricular enlargement. Unable to  determine the severity of diastolic function due to severe  mitral regurgitation. Increased E/e'  is consistent with  elevated left ventricular filling pressure.  Right Heart: Normal right atrium. Normal right ventricular  size and function. A device wire is noted in the right  heart. Normal tricuspid valve.   Moderate tricuspid  regurgitation. Normal pulmonic valve.  Pericardium/PleuraNo pericardial effusion seen.  Hemodynamic: Estimated right ventricular systolic pressure  equals 58 mm Hg, assuming right atrial pressure equals 10  mm Hg, consistent with moderate pulmonary hypertension.  ------------------------------------------------------------------------  CONCLUSIONS:  1. Calcified trileaflet aortic valve with normal opening.  Minimal aortic regurgitation.  2. Severe left ventricular enlargement.  3. Severe global left ventricular systolic dysfunction.  Severe global hypokinesis. LVEF calculated using biplane  Coffman's method is 13%. No left ventricular thrombus.  4. Unable to determine the severity of diastolic function  due to severe mitral regurgitation. Increased E/e'  is  consistent with elevated left ventricular filling pressure.  5. Normal right atrium.  6. Normal right ventricular size and function. A device  wire is noted in the right heart.  7. Normal tricuspid valve.   Moderate tricuspid  regurgitation.  8. No pericardial effusion seen.

## 2023-07-22 NOTE — PROGRESS NOTE ADULT - ASSESSMENT
73F w/ PMHx: P Afib (Eliquis) ICD Biotronik (placed at [place] 5/2023), NICM, HLD and chronic HFrEF transferred to [facility] CCU. She Initially presented to [facility] with acute decompensated HF. She was admitted to their CCU for milrinone gtt and Lasix gtt. Patient was transferred to [facility] CCU with Lasix drip at 10mg/hr, milrinone drip 0.375mcg/kg/min and heparin drip 950units/hr. EKG with LBBB. Echo demonstrated severe MR. NO LV thrombus, LVEF 13%.  Now s/p upgrade of ICD to BiV-ICD. CXR without pneumothorax.       Continue GDMT.   Patient on warfarin for LV thrombus   Pressure dressing removed as above

## 2023-07-22 NOTE — PROGRESS NOTE ADULT - ASSESSMENT
73F w/ PMHx:  Afib (Eliquis) ICD (placed at [place] 5/2023), NICM, HLD and HFrEF transferred to [facility] CCU. Initially presented to [facility] with c/o SOB, found to have volume overload, admitted to their CCU for Primacor gtt and Lasix gtt. Patient transferred with Lasix drip at 10mg/hr, milrinone drip 0.375mcg/kg/min and heparin drip infusing at 950units/hr admitted to CCU for BIV upgrade.     NEUROLOGIC:  - AOx3  - No active issues, Indonesian speaking    RESPIRATORY:  - Satting well on room air,   - No active issues    CARDIOVASCULAR:  Acute on chronic systolic heart failure   - Not volume overloaded on exam and no JVD noted  - Echo at [facility] on 7/11 shows EF 10-15%, severe MR, severe TR, +LV thrombus  - TTE this admission- EF 13%, no LV thrombus, elevated LV filling pressure  - Continue milrinone drip at 0.25 mcg/kg/min  - s/p diuresis with Lasix gtt, Lasix 60 mg IVP BID d/cd  - Continue Aldactone 25mg daily   - Add losartan 12.5mg daily   - Last 24 hrs: IN: 388.2 mL / OUT: 970 mL / NET: -581.8 mL  - Lactate 1.2 -> 2.4 -> 2.1--> 1.4   - Monitor I&Os and daily standing weights   - s/p BIV upgrade 7/21    LV thrombus   - History of LV thrombus from [facility] admission  - TTE this admission shows no evidence of LV thrombus  - Heparin gtt on hold     Afib  - CHADS-VASc 3  - Switched home Eliquis to coumadin, on Heparin gtt      GASTROINTESTINAL:  - DASH/TLC diet  - Continue PPI    /RENAL:  Hyponatremia this admission  - Na lowest overnight at 122 -> 126 -> 123 ->126   - Continue to monitor CMP  - celeste Lisa I&Os     ENDOCRINE:  #T2DM  - A1C 6.9  - ISS    DVT PPX:  - Compression device 73F w/ PMHx:  Afib (Eliquis) ICD (placed at [place] 5/2023), NICM, HLD and HFrEF transferred to [facility] CCU. Initially presented to [facility] with c/o SOB, found to have volume overload, admitted to their CCU for Primacor gtt and Lasix gtt. Patient transferred with Lasix drip at 10mg/hr, milrinone drip 0.375mcg/kg/min and heparin drip infusing at 950units/hr admitted to CCU for BIV upgrade.     NEUROLOGIC:  - AOx3  - No active issues, Urdu speaking    RESPIRATORY:  - Satting well on room air,   - No active issues    CARDIOVASCULAR:  Acute on chronic systolic heart failure   - Not volume overloaded on exam and no JVD noted  - Echo at [facility] on 7/11 shows EF 10-15%, severe MR, severe TR, +LV thrombus  - TTE this admission- EF 13%, no LV thrombus, elevated LV filling pressure  - Continue milrinone drip at 0.25 mcg/kg/min  - s/p diuresis with Lasix gtt, Lasix 60 mg IVP BID d/cd  - Continue Aldactone 25mg daily   - Add losartan 12.5mg daily   - Last 24 hrs: IN: 388.2 mL / OUT: 970 mL / NET: -581.8 mL  - Lactate 1.2 -> 2.4 -> 2.1--> 1.4   - Monitor I&Os and daily standing weights   - s/p BIV upgrade 7/21    LV thrombus   - History of LV thrombus from [facility] admission  - TTE this admission shows no evidence of LV thrombus  - Heparin gtt on hold   - Coumadin 2mg PO x1 tonight     Afib  - CHADS-VASc 3  - Switched home Eliquis to coumadin, on Heparin gtt      GASTROINTESTINAL:  - DASH/TLC diet  - Continue PPI    /RENAL:  Hyponatremia this admission  - Na lowest overnight at 122 -> 126 -> 123 ->126   - Continue to monitor CMP  - celeste Lisa I&Os     ENDOCRINE:  #T2DM  - A1C 6.9  - ISS    DVT PPX:  - Compression device

## 2023-07-23 LAB
ALBUMIN SERPL ELPH-MCNC: 3.4 G/DL — SIGNIFICANT CHANGE UP (ref 3.3–5)
ALP SERPL-CCNC: 141 U/L — HIGH (ref 40–120)
ALT FLD-CCNC: 21 U/L — SIGNIFICANT CHANGE UP (ref 4–33)
ANION GAP SERPL CALC-SCNC: 10 MMOL/L — SIGNIFICANT CHANGE UP (ref 7–14)
AST SERPL-CCNC: 26 U/L — SIGNIFICANT CHANGE UP (ref 4–32)
BASE EXCESS BLDV CALC-SCNC: 1.9 MMOL/L — SIGNIFICANT CHANGE UP (ref -2–3)
BASE EXCESS BLDV CALC-SCNC: 7.4 MMOL/L — HIGH (ref -2–3)
BILIRUB SERPL-MCNC: 1.4 MG/DL — HIGH (ref 0.2–1.2)
BLOOD GAS VENOUS COMPREHENSIVE RESULT: SIGNIFICANT CHANGE UP
BLOOD GAS VENOUS COMPREHENSIVE RESULT: SIGNIFICANT CHANGE UP
BUN SERPL-MCNC: 16 MG/DL — SIGNIFICANT CHANGE UP (ref 7–23)
CALCIUM SERPL-MCNC: 9.6 MG/DL — SIGNIFICANT CHANGE UP (ref 8.4–10.5)
CHLORIDE BLDV-SCNC: 93 MMOL/L — LOW (ref 96–108)
CHLORIDE BLDV-SCNC: 94 MMOL/L — LOW (ref 96–108)
CHLORIDE SERPL-SCNC: 95 MMOL/L — LOW (ref 98–107)
CO2 BLDV-SCNC: 26.7 MMOL/L — HIGH (ref 22–26)
CO2 BLDV-SCNC: 32.4 MMOL/L — HIGH (ref 22–26)
CO2 SERPL-SCNC: 24 MMOL/L — SIGNIFICANT CHANGE UP (ref 22–31)
CREAT SERPL-MCNC: 0.67 MG/DL — SIGNIFICANT CHANGE UP (ref 0.5–1.3)
EGFR: 92 ML/MIN/1.73M2 — SIGNIFICANT CHANGE UP
GAS PNL BLDV: 125 MMOL/L — LOW (ref 136–145)
GAS PNL BLDV: 129 MMOL/L — LOW (ref 136–145)
GAS PNL BLDV: SIGNIFICANT CHANGE UP
GLUCOSE BLDC GLUCOMTR-MCNC: 136 MG/DL — HIGH (ref 70–99)
GLUCOSE BLDC GLUCOMTR-MCNC: 159 MG/DL — HIGH (ref 70–99)
GLUCOSE BLDC GLUCOMTR-MCNC: 92 MG/DL — SIGNIFICANT CHANGE UP (ref 70–99)
GLUCOSE BLDC GLUCOMTR-MCNC: 97 MG/DL — SIGNIFICANT CHANGE UP (ref 70–99)
GLUCOSE BLDV-MCNC: 130 MG/DL — HIGH (ref 70–99)
GLUCOSE BLDV-MCNC: 183 MG/DL — HIGH (ref 70–99)
GLUCOSE SERPL-MCNC: 124 MG/DL — HIGH (ref 70–99)
HCO3 BLDV-SCNC: 26 MMOL/L — SIGNIFICANT CHANGE UP (ref 22–29)
HCO3 BLDV-SCNC: 31 MMOL/L — HIGH (ref 22–29)
HCT VFR BLD CALC: 32.2 % — LOW (ref 34.5–45)
HCT VFR BLDA CALC: 31 % — LOW (ref 34.5–46.5)
HCT VFR BLDA CALC: 33 % — LOW (ref 34.5–46.5)
HGB BLD CALC-MCNC: 10.4 G/DL — LOW (ref 11.7–16.1)
HGB BLD CALC-MCNC: 11.1 G/DL — LOW (ref 11.7–16.1)
HGB BLD-MCNC: 10.2 G/DL — LOW (ref 11.5–15.5)
INR BLD: 1.18 RATIO — HIGH (ref 0.88–1.16)
LACTATE BLDV-MCNC: 1 MMOL/L — SIGNIFICANT CHANGE UP (ref 0.5–2)
LACTATE BLDV-MCNC: 2.9 MMOL/L — HIGH (ref 0.5–2)
MAGNESIUM SERPL-MCNC: 2 MG/DL — SIGNIFICANT CHANGE UP (ref 1.6–2.6)
MCHC RBC-ENTMCNC: 26.5 PG — LOW (ref 27–34)
MCHC RBC-ENTMCNC: 31.7 GM/DL — LOW (ref 32–36)
MCV RBC AUTO: 83.6 FL — SIGNIFICANT CHANGE UP (ref 80–100)
NRBC # BLD: 0 /100 WBCS — SIGNIFICANT CHANGE UP (ref 0–0)
NRBC # FLD: 0 K/UL — SIGNIFICANT CHANGE UP (ref 0–0)
PCO2 BLDV: 36 MMHG — LOW (ref 39–52)
PCO2 BLDV: 40 MMHG — SIGNIFICANT CHANGE UP (ref 39–52)
PH BLDV: 7.46 — HIGH (ref 7.32–7.43)
PH BLDV: 7.5 — HIGH (ref 7.32–7.43)
PHOSPHATE SERPL-MCNC: 3.5 MG/DL — SIGNIFICANT CHANGE UP (ref 2.5–4.5)
PLATELET # BLD AUTO: 306 K/UL — SIGNIFICANT CHANGE UP (ref 150–400)
PO2 BLDV: 58 MMHG — HIGH (ref 25–45)
PO2 BLDV: 75 MMHG — HIGH (ref 25–45)
POTASSIUM BLDV-SCNC: 3.6 MMOL/L — SIGNIFICANT CHANGE UP (ref 3.5–5.1)
POTASSIUM BLDV-SCNC: 4.4 MMOL/L — SIGNIFICANT CHANGE UP (ref 3.5–5.1)
POTASSIUM SERPL-MCNC: 3.7 MMOL/L — SIGNIFICANT CHANGE UP (ref 3.5–5.3)
POTASSIUM SERPL-SCNC: 3.7 MMOL/L — SIGNIFICANT CHANGE UP (ref 3.5–5.3)
PROT SERPL-MCNC: 6.4 G/DL — SIGNIFICANT CHANGE UP (ref 6–8.3)
PROTHROM AB SERPL-ACNC: 13.7 SEC — HIGH (ref 10.5–13.4)
RBC # BLD: 3.85 M/UL — SIGNIFICANT CHANGE UP (ref 3.8–5.2)
RBC # FLD: 19.4 % — HIGH (ref 10.3–14.5)
SAO2 % BLDV: 88.1 % — HIGH (ref 67–88)
SAO2 % BLDV: 96.1 % — HIGH (ref 67–88)
SODIUM SERPL-SCNC: 129 MMOL/L — LOW (ref 135–145)
WBC # BLD: 7.36 K/UL — SIGNIFICANT CHANGE UP (ref 3.8–10.5)
WBC # FLD AUTO: 7.36 K/UL — SIGNIFICANT CHANGE UP (ref 3.8–10.5)

## 2023-07-23 PROCEDURE — 99233 SBSQ HOSP IP/OBS HIGH 50: CPT | Mod: GC

## 2023-07-23 RX ORDER — POTASSIUM CHLORIDE 20 MEQ
40 PACKET (EA) ORAL ONCE
Refills: 0 | Status: COMPLETED | OUTPATIENT
Start: 2023-07-23 | End: 2023-07-23

## 2023-07-23 RX ORDER — WARFARIN SODIUM 2.5 MG/1
2 TABLET ORAL ONCE
Refills: 0 | Status: COMPLETED | OUTPATIENT
Start: 2023-07-23 | End: 2023-07-23

## 2023-07-23 RX ORDER — LOSARTAN POTASSIUM 100 MG/1
25 TABLET, FILM COATED ORAL DAILY
Refills: 0 | Status: DISCONTINUED | OUTPATIENT
Start: 2023-07-23 | End: 2023-07-25

## 2023-07-23 RX ADMIN — WARFARIN SODIUM 2 MILLIGRAM(S): 2.5 TABLET ORAL at 21:37

## 2023-07-23 RX ADMIN — PANTOPRAZOLE SODIUM 40 MILLIGRAM(S): 20 TABLET, DELAYED RELEASE ORAL at 06:07

## 2023-07-23 RX ADMIN — Medication 1: at 17:20

## 2023-07-23 RX ADMIN — CHLORHEXIDINE GLUCONATE 1 APPLICATION(S): 213 SOLUTION TOPICAL at 05:51

## 2023-07-23 RX ADMIN — SPIRONOLACTONE 25 MILLIGRAM(S): 25 TABLET, FILM COATED ORAL at 06:04

## 2023-07-23 RX ADMIN — Medication 650 MILLIGRAM(S): at 19:32

## 2023-07-23 RX ADMIN — LOSARTAN POTASSIUM 25 MILLIGRAM(S): 100 TABLET, FILM COATED ORAL at 12:14

## 2023-07-23 RX ADMIN — Medication 40 MILLIEQUIVALENT(S): at 06:04

## 2023-07-23 RX ADMIN — Medication 650 MILLIGRAM(S): at 20:02

## 2023-07-23 NOTE — PROGRESS NOTE ADULT - SUBJECTIVE AND OBJECTIVE BOX
Matti Reed NP  CCU Service  Cardiology   Spect: [id number] ([facility])     PATIENT: LARISSA HAHN, MRN: [id number]    CHIEF COMPLAINT: Patient is a 73y old  Female who presents with a chief complaint of Transferred for BIV upgrade (2023 10:18)      INTERVAL HISTORY/OVERNIGHT EVENTS: No overnight events. Denies abdominal pain, chest pain or SOB, cough. Has been ambulating with assistance. Oriented to person, place, and time. Breathing comfortably on room air.    REVIEW OF SYSTEMS:    Constitutional:     [ ] negative [ ] fevers [ ] chills [ ] weight loss [ ] weight gain  HEENT:                  [ ] negative [ ] dry eyes [ ] eye irritation [ ] postnasal drip [ ] nasal congestion  CV:                         [ ] negative  [ ] chest pain [ ] orthopnea [ ] palpitations [ ] murmur  Resp:                     [ ] negative [ ] cough [ ] shortness of breath [ ] dyspnea [ ] wheezing [ ] sputum [ ] hemoptysis  GI:                          [ ] negative [ ] nausea [ ] vomiting [ ] diarrhea [ ] constipation [ ] abd pain [ ] dysphagia   :                        [ ] negative [ ] dysuria [ ] nocturia [ ] hematuria [ ] increased urinary frequency  Musculoskeletal: [ ] negative [ ] back pain [ ] myalgias [ ] arthralgias [ ] fracture  Skin:                       [ ] negative [ ] rash [ ] itch  Neurological:        [ ] negative [ ] headache [ ] dizziness [ ] syncope [ ] weakness [ ] numbness  Psychiatric:           [ ] negative [ ] anxiety [ ] depression  Endocrine:            [ ] negative [x ] diabetes [ ] thyroid problem  Heme/Lymph:      [ ] negative [ ] anemia [ ] bleeding problem  Allergic/Immune: [ ] negative [ ] itchy eyes [ ] nasal discharge [ ] hives [ ] angioedema    [x ] All other systems negative  [ ] Unable to assess ROS because ________.    MEDICATIONS:  MEDICATIONS  (STANDING):    insulin lispro (ADMELOG) corrective regimen sliding scale   SubCutaneous three times a day before meals  insulin lispro (ADMELOG) corrective regimen sliding scale   SubCutaneous at bedtime  losartan 25 milliGRAM(s) Oral daily  pantoprazole    Tablet 40 milliGRAM(s) Oral before breakfast  spironolactone 25 milliGRAM(s) Oral daily    MEDICATIONS  (PRN):  acetaminophen     Tablet .. 650 milliGRAM(s) Oral every 6 hours PRN Temp greater or equal to 38C (100.4F), Mild Pain (1 - 3)  dextrose Oral Gel 15 Gram(s) Oral once PRN Blood Glucose LESS THAN 70 milliGRAM(s)/deciliter      ALLERGIES: Allergies    No Known Allergies    Intolerances        OBJECTIVE:  ICU Vital Signs Last 24 Hrs  T(C): 36.9 (2023 04:00), Max: 37.1 (2023 16:00)  T(F): 98.4 (2023 04:00), Max: 98.8 (2023 16:00)  HR: 84 (2023 06:00) (81 - 99)  BP: 100/54 (2023 06:00) (85/48 - 103/59)  BP(mean): 65 (2023 06:00) (56 - 77)  RR: 22 (2023 06:00) (14 - 31)  SpO2: 96% (2023 06:00) (94% - 100%)    O2 Parameters below as of 2023 06:00  Patient On (Oxygen Delivery Method): room air    CAPILLARY BLOOD GLUCOSE      POCT Blood Glucose.: 134 mg/dL (2023 21:14)  POCT Blood Glucose.: 117 mg/dL (2023 17:00)  POCT Blood Glucose.: 223 mg/dL (2023 12:25)  POCT Blood Glucose.: 166 mg/dL (2023 08:18)    CAPILLARY BLOOD GLUCOSE      POCT Blood Glucose.: 134 mg/dL (2023 21:14)    I&O's Summary    2023 07:01  -  2023 07:00  --------------------------------------------------------  IN: 765.2 mL / OUT: 2120 mL / NET: -1354.8 mL      Daily Weight in k.2 (2023 03:00)    PHYSICAL EXAMINATION:  General: Comfortable, no acute distress, cooperative with exam.  HEENT: Moist mucous membranes.  Respiratory: CTAB, normal respiratory effort, no coughing, wheezes, crackles, or rales.  CV: RRR, S1S2, no murmurs, rubs or gallops. No JVD. Distal pulses intact.  Abdominal: Soft, nontender, nondistended, no rebound or guarding, normal bowel sounds.  Neurology: AOx3, no focal neuro defects, ANDRES x 4.  Extremities: No pitting edema, + Peripheral pulses. Warm   Cath site:   Tubes:    LABS:                          10.2   7.36  )-----------( 306      ( 2023 04:06 )             32.2     07-    129<L>  |  95<L>  |  16  ----------------------------<  124<H>  3.7   |  24  |  0.67    Ca    9.6      2023 04:06  Phos  3.5     07-  Mg     2.00     -    TPro  6.4  /  Alb  3.4  /  TBili  1.4<H>  /  DBili  x   /  AST  26  /  ALT  21  /  AlkPhos  141<H>  07-23    LIVER FUNCTIONS - ( 2023 04:06 )  Alb: 3.4 g/dL / Pro: 6.4 g/dL / ALK PHOS: 141 U/L / ALT: 21 U/L / AST: 26 U/L / GGT: x           PT/INR - ( 2023 04:06 )   PT: 13.7 sec;   INR: 1.18 ratio                 Urinalysis Basic - ( 2023 04:06 )    Color: x / Appearance: x / SG: x / pH: x  Gluc: 124 mg/dL / Ketone: x  / Bili: x / Urobili: x   Blood: x / Protein: x / Nitrite: x   Leuk Esterase: x / RBC: x / WBC x   Sq Epi: x / Non Sq Epi: x / Bacteria: x        TELEMETRY: Pacing     EKG:     IMAGING:     TTE with Doppler (w/Cont) (23 @ 14:44)  DIMENSIONS:  Dimensions:     Normal Values:  LA:     5.5 cm    2.0 - 4.0 cm  Ao:     3.2 cm    2.0 - 3.8 cm  SEPTUM: 0.7 cm    0.6 - 1.2 cm  PWT:0.8 cm    0.6 - 1.1 cm  LVIDd:  7.8 cm    3.0 - 5.6 cm  LVIDs:  6.7 cm    1.8 - 4.0 cm  Derived Variables:  LVMI: 140 g/m2  RWT: 0.20  Fractional short: 14 %  Ejection Fraction (Modified Coffman Rule): 13 %  ------------------------------------------------------------------------  OBSERVATIONS:  Mitral Valve: Normal mitral valve. Severe  mitral  regurgitation with a vena contracta measured: 0.94 cm.  Aortic Root: Normal aortic root.  Aortic Valve: Calcified trileaflet aortic valve with normal  opening. Minimal aortic regurgitation.  Left Atrium: Severely dilated left atrium.  LA volume index  = 51 cc/m2.  Left Ventricle: Severe global left ventricular systolic  dysfunction. Severe global hypokinesis. LVEF calculated  using biplane Coffman's method is 13%. No left ventricular  thrombus. Severe left ventricular enlargement. Unable to  determine the severity of diastolic function due to severe  mitral regurgitation. Increased E/e'  is consistent with  elevated left ventricular filling pressure.  Right Heart: Normal right atrium. Normal right ventricular  size and function. A device wire is noted in the right  heart. Normal tricuspid valve.   Moderate tricuspid  regurgitation. Normal pulmonic valve.  Pericardium/PleuraNo pericardial effusion seen.  Hemodynamic: Estimated right ventricular systolic pressure  equals 58 mm Hg, assuming right atrial pressure equals 10  mm Hg, consistent with moderate pulmonary hypertension.  ------------------------------------------------------------------------  CONCLUSIONS:  1. Calcified trileaflet aortic valve with normal opening.  Minimal aortic regurgitation.  2. Severe left ventricular enlargement.  3. Severe global left ventricular systolic dysfunction.  Severe global hypokinesis. LVEF calculated using biplane  Coffman's method is 13%. No left ventricular thrombus.  4. Unable to determine the severity of diastolic function  due to severe mitral regurgitation. Increased E/e'  is  consistent with elevated left ventricular filling pressure.  5. Normal right atrium.  6. Normal right ventricular size and function. A device  wire is noted in the right heart.  7. Normal tricuspid valve.   Moderate tricuspid  regurgitation.

## 2023-07-23 NOTE — PROGRESS NOTE ADULT - ASSESSMENT
73F w/ PMHx:  Afib (Eliquis) ICD (placed at [place] 5/2023), NICM, HLD and HFrEF transferred to [facility] CCU. Initially presented to [facility] with c/o SOB, found to have volume overload, admitted to their CCU for Primacor gtt and Lasix gtt. Patient transferred with Lasix drip at 10mg/hr, milrinone drip 0.375mcg/kg/min and heparin drip infusing at 950units/hr admitted to CCU for BIV upgrade.     NEUROLOGIC:  - AOx3  - No active issues, German speaking    RESPIRATORY:  - Satting well on room air,   - No active issues    CARDIOVASCULAR:  Acute on chronic systolic heart failure   - Not volume overloaded on exam and no JVD noted  - Echo at [facility] on 7/11 shows EF 10-15%, severe MR, severe TR, +LV thrombus  - TTE this admission- EF 13%, no LV thrombus, elevated LV filling pressure  - Continue milrinone drip at 0.25 mcg/kg/min  - s/p diuresis with Lasix gtt, Lasix 60 mg IVP BID d/cd  - Continue Aldactone 25mg daily   - Increased losartan 25mg PO daily   - Last 24 hrs: IN: 765.2 mL / OUT: 2120 mL / NET: -1354.8 mL  - Lactate 1.2 -> 2.4 -> 2.1--> 1.4 -->1.0   - Monitor I&Os and daily standing weights   - s/p BIV upgraded 7/21    LV thrombus   - History of LV thrombus from [facility] admission  - TTE this admission shows no evidence of LV thrombus  - Heparin gtt on hold   - Coumadin 2mg PO x1 tonight     Afib  - CHADS-VASc 3  - Switched home Eliquis to coumadin, now on coumadin     GASTROINTESTINAL:  - DASH/TLC diet  - Continue PPI    /RENAL:  Hyponatremia this admission  - Na lowest overnight at 122 -> 126 -> 123 ->126 -->129  - Continue to monitor CMP  - celeste Lisa I&Os     ENDOCRINE:  #T2DM  - A1C 6.9  - ISS    DVT PPX:  - On AC

## 2023-07-24 PROBLEM — Z00.00 ENCOUNTER FOR PREVENTIVE HEALTH EXAMINATION: Status: ACTIVE | Noted: 2023-07-24

## 2023-07-24 LAB
ALBUMIN SERPL ELPH-MCNC: 3.4 G/DL — SIGNIFICANT CHANGE UP (ref 3.3–5)
ALP SERPL-CCNC: 142 U/L — HIGH (ref 40–120)
ALT FLD-CCNC: 19 U/L — SIGNIFICANT CHANGE UP (ref 4–33)
ANION GAP SERPL CALC-SCNC: 10 MMOL/L — SIGNIFICANT CHANGE UP (ref 7–14)
AST SERPL-CCNC: 23 U/L — SIGNIFICANT CHANGE UP (ref 4–32)
BASE EXCESS BLDV CALC-SCNC: 4.7 MMOL/L — HIGH (ref -2–3)
BILIRUB SERPL-MCNC: 1.1 MG/DL — SIGNIFICANT CHANGE UP (ref 0.2–1.2)
BLOOD GAS VENOUS COMPREHENSIVE RESULT: SIGNIFICANT CHANGE UP
BUN SERPL-MCNC: 20 MG/DL — SIGNIFICANT CHANGE UP (ref 7–23)
CALCIUM SERPL-MCNC: 9.8 MG/DL — SIGNIFICANT CHANGE UP (ref 8.4–10.5)
CHLORIDE BLDV-SCNC: 96 MMOL/L — SIGNIFICANT CHANGE UP (ref 96–108)
CHLORIDE SERPL-SCNC: 96 MMOL/L — LOW (ref 98–107)
CO2 BLDV-SCNC: 30.5 MMOL/L — HIGH (ref 22–26)
CO2 SERPL-SCNC: 24 MMOL/L — SIGNIFICANT CHANGE UP (ref 22–31)
CREAT SERPL-MCNC: 0.76 MG/DL — SIGNIFICANT CHANGE UP (ref 0.5–1.3)
EGFR: 82 ML/MIN/1.73M2 — SIGNIFICANT CHANGE UP
GAS PNL BLDV: 129 MMOL/L — LOW (ref 136–145)
GLUCOSE BLDC GLUCOMTR-MCNC: 100 MG/DL — HIGH (ref 70–99)
GLUCOSE BLDC GLUCOMTR-MCNC: 125 MG/DL — HIGH (ref 70–99)
GLUCOSE BLDC GLUCOMTR-MCNC: 133 MG/DL — HIGH (ref 70–99)
GLUCOSE BLDC GLUCOMTR-MCNC: 144 MG/DL — HIGH (ref 70–99)
GLUCOSE BLDV-MCNC: 125 MG/DL — HIGH (ref 70–99)
GLUCOSE SERPL-MCNC: 121 MG/DL — HIGH (ref 70–99)
HCO3 BLDV-SCNC: 29 MMOL/L — SIGNIFICANT CHANGE UP (ref 22–29)
HCT VFR BLD CALC: 32.2 % — LOW (ref 34.5–45)
HCT VFR BLDA CALC: 32 % — LOW (ref 34.5–46.5)
HGB BLD CALC-MCNC: 10.5 G/DL — LOW (ref 11.7–16.1)
HGB BLD-MCNC: 10.3 G/DL — LOW (ref 11.5–15.5)
INR BLD: 1.32 RATIO — HIGH (ref 0.88–1.16)
LACTATE BLDV-MCNC: 0.9 MMOL/L — SIGNIFICANT CHANGE UP (ref 0.5–2)
MAGNESIUM SERPL-MCNC: 1.9 MG/DL — SIGNIFICANT CHANGE UP (ref 1.6–2.6)
MCHC RBC-ENTMCNC: 27.3 PG — SIGNIFICANT CHANGE UP (ref 27–34)
MCHC RBC-ENTMCNC: 32 GM/DL — SIGNIFICANT CHANGE UP (ref 32–36)
MCV RBC AUTO: 85.4 FL — SIGNIFICANT CHANGE UP (ref 80–100)
NRBC # BLD: 0 /100 WBCS — SIGNIFICANT CHANGE UP (ref 0–0)
NRBC # FLD: 0 K/UL — SIGNIFICANT CHANGE UP (ref 0–0)
PCO2 BLDV: 42 MMHG — SIGNIFICANT CHANGE UP (ref 39–52)
PH BLDV: 7.45 — HIGH (ref 7.32–7.43)
PHOSPHATE SERPL-MCNC: 3.8 MG/DL — SIGNIFICANT CHANGE UP (ref 2.5–4.5)
PLATELET # BLD AUTO: 290 K/UL — SIGNIFICANT CHANGE UP (ref 150–400)
PO2 BLDV: 58 MMHG — HIGH (ref 25–45)
POTASSIUM BLDV-SCNC: 4.2 MMOL/L — SIGNIFICANT CHANGE UP (ref 3.5–5.1)
POTASSIUM SERPL-MCNC: 4.2 MMOL/L — SIGNIFICANT CHANGE UP (ref 3.5–5.3)
POTASSIUM SERPL-SCNC: 4.2 MMOL/L — SIGNIFICANT CHANGE UP (ref 3.5–5.3)
PROT SERPL-MCNC: 6.6 G/DL — SIGNIFICANT CHANGE UP (ref 6–8.3)
PROTHROM AB SERPL-ACNC: 15.3 SEC — HIGH (ref 10.5–13.4)
RBC # BLD: 3.77 M/UL — LOW (ref 3.8–5.2)
RBC # FLD: 19.9 % — HIGH (ref 10.3–14.5)
SAO2 % BLDV: 88 % — SIGNIFICANT CHANGE UP (ref 67–88)
SODIUM SERPL-SCNC: 130 MMOL/L — LOW (ref 135–145)
WBC # BLD: 7.87 K/UL — SIGNIFICANT CHANGE UP (ref 3.8–10.5)
WBC # FLD AUTO: 7.87 K/UL — SIGNIFICANT CHANGE UP (ref 3.8–10.5)

## 2023-07-24 PROCEDURE — 99232 SBSQ HOSP IP/OBS MODERATE 35: CPT

## 2023-07-24 PROCEDURE — 99233 SBSQ HOSP IP/OBS HIGH 50: CPT

## 2023-07-24 PROCEDURE — 93306 TTE W/DOPPLER COMPLETE: CPT | Mod: 26

## 2023-07-24 RX ORDER — APIXABAN 2.5 MG/1
1 TABLET, FILM COATED ORAL
Qty: 60 | Refills: 0
Start: 2023-07-24 | End: 2023-08-22

## 2023-07-24 RX ORDER — MAGNESIUM SULFATE 500 MG/ML
1 VIAL (ML) INJECTION ONCE
Refills: 0 | Status: COMPLETED | OUTPATIENT
Start: 2023-07-24 | End: 2023-07-24

## 2023-07-24 RX ORDER — APIXABAN 2.5 MG/1
5 TABLET, FILM COATED ORAL
Refills: 0 | Status: DISCONTINUED | OUTPATIENT
Start: 2023-07-24 | End: 2023-07-24

## 2023-07-24 RX ORDER — FUROSEMIDE 40 MG
20 TABLET ORAL DAILY
Refills: 0 | Status: DISCONTINUED | OUTPATIENT
Start: 2023-07-25 | End: 2023-07-25

## 2023-07-24 RX ORDER — POLYETHYLENE GLYCOL 3350 17 G/17G
17 POWDER, FOR SOLUTION ORAL
Refills: 0 | Status: DISCONTINUED | OUTPATIENT
Start: 2023-07-24 | End: 2023-07-25

## 2023-07-24 RX ORDER — APIXABAN 2.5 MG/1
5 TABLET, FILM COATED ORAL EVERY 12 HOURS
Refills: 0 | Status: DISCONTINUED | OUTPATIENT
Start: 2023-07-25 | End: 2023-07-25

## 2023-07-24 RX ORDER — HYDRALAZINE HCL 50 MG
25 TABLET ORAL ONCE
Refills: 0 | Status: DISCONTINUED | OUTPATIENT
Start: 2023-07-24 | End: 2023-07-24

## 2023-07-24 RX ADMIN — POLYETHYLENE GLYCOL 3350 17 GRAM(S): 17 POWDER, FOR SOLUTION ORAL at 14:18

## 2023-07-24 RX ADMIN — SPIRONOLACTONE 25 MILLIGRAM(S): 25 TABLET, FILM COATED ORAL at 05:11

## 2023-07-24 RX ADMIN — APIXABAN 5 MILLIGRAM(S): 2.5 TABLET, FILM COATED ORAL at 14:47

## 2023-07-24 RX ADMIN — Medication 100 GRAM(S): at 06:10

## 2023-07-24 RX ADMIN — PANTOPRAZOLE SODIUM 40 MILLIGRAM(S): 20 TABLET, DELAYED RELEASE ORAL at 05:11

## 2023-07-24 RX ADMIN — POLYETHYLENE GLYCOL 3350 17 GRAM(S): 17 POWDER, FOR SOLUTION ORAL at 05:11

## 2023-07-24 RX ADMIN — LOSARTAN POTASSIUM 25 MILLIGRAM(S): 100 TABLET, FILM COATED ORAL at 05:11

## 2023-07-24 RX ADMIN — CHLORHEXIDINE GLUCONATE 1 APPLICATION(S): 213 SOLUTION TOPICAL at 05:10

## 2023-07-24 NOTE — PROGRESS NOTE ADULT - ASSESSMENT
73F w/ PMHx:  Afib (Eliquis started in [place]), ICD (placed at [place] 5/2023), NICM, HLD and HFrEF, initially presented to [facility] with volume overload, Admitted to their CCU for Primacor gtt and Lasix gtt. Echo at [facility] on 7/11 demonstrated EF 10-15%, severe MR, severe TR, +LV thrombus. Patient transferred to [facility] for BIV upgrade. ECHO here showed EF 13%, elevated LV filling pressure. No LV thrombus. Patient underwent upgrade to a CRT-D on 7/21/23. Tolerated the procedure well. No complications. Now off milrinone and Lasix gtt.   Patient was to resume Eliquis tomorrow for PAF however, started on Coumadin. INR subtherapeutic.   Post procedure BiV-ICD teaching done with patient and her family.  Written instructions and contact information provided.   She was given the home monitor with written and verbal instructions.   She has a follow-up appointment in the device clinic on 8/3/2023 at 9:20am  4th floor Oncology Building [phone].

## 2023-07-24 NOTE — PROGRESS NOTE ADULT - CRITICAL CARE ATTENDING COMMENT
73 year old woman with AFIB on Eliquis, ICD placed in May 2023 at [facility], HFrEF (NICM) who was initially admitted to [facility] with dyspnea and fluid overload. Placed in CCU at [facility] and started on Lasix gtt with addition of Milrinone in setting of acute on chronic systolic heart failure. She was transferred to CCU for possible BiVICD upgrade.  TTE at [facility] showed Severe LVD with LV thrombus, severe MR/severe TR  Milrinone stopped 7/23/23    TTE 7/19/23  CONCLUSIONS:  1. Calcified trileaflet aortic valve with normal opening.  Minimal aortic regurgitation.  2. Severe left ventricular enlargement.  3. Severe global left ventricular systolic dysfunction.  Severe global hypokinesis. LVEF calculated using biplane  Coffman's method is 13%. No left ventricular thrombus.  4. Unable to determine the severity of diastolic function  due to severe mitral regurgitation. Increased E/e'  is  consistent with elevated left ventricular filling pressure.  5. Normal right atrium.  6. Normal right ventricular size and function. A device  wire is noted in the right heart.  7. Normal tricuspid valve.   Moderate tricuspid  regurgitation.  8. No pericardial effusion seen.  *** No previous Echo exam.    Meds:  Losartan 25 mg daily  Aldactone 25 mg daily  Protonix  Insulin    #Neuro- Japanese speaking, no active issues  #Pulm-Diuresed with lasix  Continue to monitor  #CV- Acute on chronic systolic heart failure  Continue losartan  Continue Aldactone  sp BiVICD  #Renal- Cr stable, continue to monitor  #ID- no active issues  #DVT ppx - SCD

## 2023-07-24 NOTE — PROGRESS NOTE ADULT - SUBJECTIVE AND OBJECTIVE BOX
Patient German speaking. Requesting daughter to translate.  States she is comfortable. No chest pain, shortness of breath, palpitations or lightheadedness.   Denies pain/discomfort at the device site.     Vital Signs Last 24 Hrs  T(C): 36.6 (24 Jul 2023 08:00), Max: 37.7 (23 Jul 2023 20:11)  T(F): 97.9 (24 Jul 2023 08:00), Max: 99.8 (23 Jul 2023 20:11)  HR: 84 (24 Jul 2023 09:00) (81 - 100)  BP: 96/48 (24 Jul 2023 09:00) (82/54 - 118/78)  BP(mean): 60 (24 Jul 2023 09:00) (56 - 92)  RR: 25 (24 Jul 2023 09:00) (16 - 29)  SpO2: 97% (24 Jul 2023 09:00) (97% - 100%)    Parameters below as of 24 Jul 2023 09:00  Patient On (Oxygen Delivery Method): room air      Telemetry: Normal sinus rhythm with biventricular pacing and intermittent atrial pacing.     MEDICATIONS  (STANDING):  chlorhexidine 2% Cloths 1 Application(s) Topical <User Schedule>  dextrose 5%. 1000 milliLiter(s) (50 mL/Hr) IV Continuous <Continuous>  dextrose 5%. 1000 milliLiter(s) (100 mL/Hr) IV Continuous <Continuous>  dextrose 50% Injectable 25 Gram(s) IV Push once  dextrose 50% Injectable 12.5 Gram(s) IV Push once  dextrose 50% Injectable 25 Gram(s) IV Push once  glucagon  Injectable 1 milliGRAM(s) IntraMuscular once  insulin lispro (ADMELOG) corrective regimen sliding scale   SubCutaneous three times a day before meals  insulin lispro (ADMELOG) corrective regimen sliding scale   SubCutaneous at bedtime  losartan 25 milliGRAM(s) Oral daily  pantoprazole    Tablet 40 milliGRAM(s) Oral before breakfast  polyethylene glycol 3350 17 Gram(s) Oral two times a day  spironolactone 25 milliGRAM(s) Oral daily    MEDICATIONS  (PRN):  acetaminophen     Tablet .. 650 milliGRAM(s) Oral every 6 hours PRN Temp greater or equal to 38C (100.4F), Mild Pain (1 - 3)  dextrose Oral Gel 15 Gram(s) Oral once PRN Blood Glucose LESS THAN 70 milliGRAM(s)/deciliter          Physical exam:   Gen- Awake, alert, well developed well nourished NAD  Resp- clear to auscultation. No wheezing, rales or rhonchi  CV-  S1 and S2 RRR. No murmurs, gallops or rubs No JVD.  PPM site with mild swelling. Dermabond glue intact. No bleeding or ecchymosis.  ABD- soft nontender + bowel sounds.   EXT- no edema No calf tenderness  Neuro- grossly nonfocal                            10.3   7.87  )-----------( 290      ( 24 Jul 2023 04:39 )             32.2     PT/INR - ( 24 Jul 2023 04:39 )   PT: 15.3 sec;   INR: 1.32 ratio           07-24    130<L>  |  96<L>  |  20  ----------------------------<  121<H>  4.2   |  24  |  0.76    Ca    9.8      24 Jul 2023 04:39  Phos  3.8     07-24  Mg     1.90     07-24    TPro  6.6  /  Alb  3.4  /  TBili  1.1  /  DBili  x   /  AST  23  /  ALT  19  /  AlkPhos  142<H>  07-24        ECHOCARDIOGRAM:  Patient name: LARISSA HAHN  YOB: 1950   Age: 73 (F)   MR#: [id number]  Study Date: 7/19/2023  [facility]  Study quality: Technically good  Referring Physician: Landen Galeano MD  Blood Pressure: 99/64 mmHg  Height: 211 cm  Weight: 59 kg  BSA: 2 m2  Heart Rate: 97 mmHg  ------------------------------------------------------------------------  PROCEDURE: Transthoracic echocardiogram with 2-D, M-Mode  and complete spectral and color flow Doppler.  Intravenous ultrasound enhancing agent was administered for  improved left ventricular endocardial border definition.  Following the intravenous injection of ultrasound enhancing  agent, harmonic imaging was performed.  INDICATION: Heart failure, unspecified (I50.9)  ------------------------------------------------------------------------  DIMENSIONS:  Dimensions:     Normal Values:  LA:     5.5 cm    2.0 - 4.0 cm  Ao:     3.2 cm    2.0 - 3.8 cm  SEPTUM: 0.7 cm    0.6 - 1.2 cm  PWT:    0.8 cm    0.6 - 1.1 cm  LVIDd:  7.8 cm    3.0 - 5.6 cm  LVIDs:  6.7 cm    1.8 - 4.0 cm  Derived Variables:  LVMI: 140 g/m2  RWT: 0.20  Fractional short: 14 %  Ejection Fraction (Modified Coffman Rule): 13 %  ------------------------------------------------------------------------  OBSERVATIONS:  Mitral Valve: Normal mitral valve. Severe  mitral  regurgitation with a vena contracta measured: 0.94 cm.  Aortic Root: Normal aortic root.  Aortic Valve: Calcified trileaflet aortic valve with normal  opening. Minimal aortic regurgitation.  Left Atrium: Severely dilated left atrium.  LA volume index  = 51 cc/m2.  Left Ventricle: Severe global left ventricular systolic  dysfunction. Severe global hypokinesis. LVEF calculated  using biplane Coffman's method is 13%. No left ventricular  thrombus. Severe left ventricular enlargement. Unable to  determine the severity of diastolic function due to severe  mitral regurgitation. Increased E/e'  is consistent with  elevated left ventricular filling pressure.  Right Heart: Normal right atrium. Normal right ventricular  size and function. A device wire is noted in the right  heart. Normal tricuspid valve.   Moderate tricuspid  regurgitation. Normal pulmonic valve.  Pericardium/PleuraNo pericardial effusion seen.  Hemodynamic: Estimated right ventricular systolic pressure  equals 58 mm Hg, assuming right atrial pressure equals 10  mm Hg, consistent with moderate pulmonary hypertension.  ------------------------------------------------------------------------  CONCLUSIONS:  1. Calcified trileaflet aortic valve with normal opening.  Minimal aortic regurgitation.  2. Severe left ventricular enlargement.  3. Severe global left ventricular systolic dysfunction.  Severe global hypokinesis. LVEF calculated using biplane  Coffman's method is 13%. No left ventricular thrombus.  4. Unable to determine the severity of diastolic function  due to severe mitral regurgitation. Increased E/e'  is  consistent with elevated left ventricular filling pressure.  5. Normal right atrium.  6. Normal right ventricular size and function. A device  wire is noted in the right heart.  7. Normal tricuspid valve.   Moderate tricuspid  regurgitation.  8. No pericardial effusion seen.  *** No previous Echo exam.  Communicated results to: Landen Galeano MD through [employer]  messaging.  ------------------------------------------------------------------------  Confirmed on  7/19/2023 - 16:56:49 by Zully Calloway M.D.

## 2023-07-24 NOTE — PROGRESS NOTE ADULT - SUBJECTIVE AND OBJECTIVE BOX
Subjective/Objective: Patient alert, awake , Latvian speaking . Use  ID # [id number]    Tele event:Vpaced at 80s    MEDICATIONS    losartan 25 milliGRAM(s) Oral daily  spironolactone 25 milliGRAM(s) Oral daily  acetaminophen     Tablet .. 650 milliGRAM(s) Oral every 6 hours PRN  pantoprazole    Tablet 40 milliGRAM(s) Oral before breakfast  polyethylene glycol 3350 17 Gram(s) Oral two times a day  dextrose 50% Injectable 25 Gram(s) IV Push once  dextrose 50% Injectable 12.5 Gram(s) IV Push once  dextrose 50% Injectable 25 Gram(s) IV Push once  dextrose Oral Gel 15 Gram(s) Oral once PRN  glucagon  Injectable 1 milliGRAM(s) IntraMuscular once  insulin lispro (ADMELOG) corrective regimen sliding scale   SubCutaneous three times a day before meals  insulin lispro (ADMELOG) corrective regimen sliding scale   SubCutaneous at bedtime  chlorhexidine 2% Cloths 1 Application(s) Topical <User Schedule>  dextrose 5%. 1000 milliLiter(s) IV Continuous <Continuous>  dextrose 5%. 1000 milliLiter(s) IV Continuous <Continuous>          ICU Vital Signs Last 24 Hrs  T(C): 36.6 (24 Jul 2023 08:00), Max: 37.7 (23 Jul 2023 20:11)  T(F): 97.9 (24 Jul 2023 08:00), Max: 99.8 (23 Jul 2023 20:11)  HR: 93 (24 Jul 2023 08:00) (81 - 100)  BP: 106/64 (24 Jul 2023 08:00) (82/54 - 118/78)  BP(mean): 73 (24 Jul 2023 08:00) (56 - 92)  RR: 23 (24 Jul 2023 08:00) (16 - 29)  SpO2: 99% (24 Jul 2023 08:00) (96% - 100%)    O2 Parameters below as of 24 Jul 2023 08:00  Patient On (Oxygen Delivery Method): room air            PHYSICAL EXAMINATION  Appearance: NAD, no distress  HEENT: Moist Mucous Membranes, Anicteric, PERRL, EOMI  Cardiovascular: Regular rate and rhythm, Normal S1 S2, No JVD, No murmurs  Respiratory: Lungs clear to auscultation. No rales, No rhonchi, No wheezing  Gastrointestinal:  Soft, Non-tender, + BS  Neurologic: Non-focal, A&Ox3  Skin: Warm and dry, No rashes, No ecchymosis, No cyanosis  Musculoskeletal: No clubbing, No cyanosis, No edema  Vascular: Peripheral pulses palpable 2+ bilaterally  Psychiatry: Mood & affect appropriate      	    		      I&O's Summary    23 Jul 2023 07:01  -  24 Jul 2023 07:00  --------------------------------------------------------  IN: 1010 mL / OUT: 1850 mL / NET: -840 mL    24 Jul 2023 07:01  -  24 Jul 2023 08:39  --------------------------------------------------------  IN: 200 mL / OUT: 150 mL / NET: 50 mL    	     LABS:	  LABORATORY VALUES	 	                          10.3   7.87  )-----------( 290      ( 24 Jul 2023 04:39 )             32.2       07-24    130<L>  |  96<L>  |  20  ----------------------------<  121<H>  4.2   |  24  |  0.76  07-23    129<L>  |  95<L>  |  16  ----------------------------<  124<H>  3.7   |  24  |  0.67    Ca    9.8      24 Jul 2023 04:39  Ca    9.6      23 Jul 2023 04:06  Phos  3.8     07-24  Phos  3.5     07-23  Mg     1.90     07-24  Mg     2.00     07-23    TPro  6.6  /  Alb  3.4  /  TBili  1.1  /  DBili  x   /  AST  23  /  ALT  19  /  AlkPhos  142<H>  07-24  TPro  6.4  /  Alb  3.4  /  TBili  1.4<H>  /  DBili  x   /  AST  26  /  ALT  21  /  AlkPhos  141<H>  07-23    LIVER FUNCTIONS - ( 24 Jul 2023 04:39 )  Alb: 3.4 g/dL / Pro: 6.6 g/dL / ALK PHOS: 142 U/L / ALT: 19 U/L / AST: 23 U/L / GGT: x           Prothrombin Time, Plasma: 15.3 sec (07-24 @ 04:39)      CARDIAC MARKERS:            Blood Gas Venous - Lactate: 0.9 mmol/L (07-24 @ 04:39)  Blood Gas Venous - Lactate: 2.9 mmol/L (07-23 @ 13:00)  Blood Gas Venous - Lactate: 1.0 mmol/L (07-23 @ 04:06)    07-18 @ 22:10  Cholesterol, Serum - 167  Direct LDL- --  HDL Cholesterol, Serum- 65  Triglycerides, Serum- 58      Thyroid Stimulating Hormone, Serum: 1.29 uIU/mL (07-18 @ 22:10)      Urinalysis Basic - ( 24 Jul 2023 04:39 )    Color: x / Appearance: x / SG: x / pH: x  Gluc: 121 mg/dL / Ketone: x  / Bili: x / Urobili: x   Blood: x / Protein: x / Nitrite: x   Leuk Esterase: x / RBC: x / WBC x   Sq Epi: x / Non Sq Epi: x / Bacteria: x      CAPILLARY BLOOD GLUCOSE      POCT Blood Glucose.: 133 mg/dL (24 Jul 2023 07:53)      < from: TTE with Doppler (w/Cont) (07.19.23 @ 14:44) >  DIMENSIONS:  Dimensions:     Normal Values:  LA:     5.5 cm    2.0 - 4.0 cm  Ao:     3.2 cm    2.0 - 3.8 cm  SEPTUM: 0.7 cm    0.6 - 1.2 cm  PWT:0.8 cm    0.6 - 1.1 cm  LVIDd:  7.8 cm    3.0 - 5.6 cm  LVIDs:  6.7 cm    1.8 - 4.0 cm  Derived Variables:  LVMI: 140 g/m2  RWT: 0.20  Fractional short: 14 %  Ejection Fraction (Modified Coffman Rule): 13 %  ------------------------------------------------------------------------  OBSERVATIONS:  Mitral Valve: Normal mitral valve. Severe  mitral  regurgitation with a vena contracta measured: 0.94 cm.  Aortic Root: Normal aortic root.  Aortic Valve: Calcified trileaflet aortic valve with normal  opening. Minimal aortic regurgitation.  Left Atrium: Severely dilated left atrium.  LA volume index  = 51 cc/m2.  Left Ventricle: Severe global left ventricular systolic  dysfunction. Severe global hypokinesis. LVEF calculated  using biplane Coffman's method is 13%. No left ventricular  thrombus. Severe left ventricular enlargement. Unable to  determine the severity of diastolic function due to severe  mitral regurgitation. Increased E/e'  is consistent with  elevated left ventricular filling pressure.  Right Heart: Normal right atrium. Normal right ventricular  size and function. A device wire is noted in the right  heart. Normal tricuspid valve.   Moderate tricuspid  regurgitation. Normal pulmonic valve.  Pericardium/PleuraNo pericardial effusion seen.  Hemodynamic: Estimated right ventricular systolic pressure  equals 58 mm Hg, assuming right atrial pressure equals 10  mm Hg, consistent with moderate pulmonary hypertension.    < end of copied text >    < from: Xray Chest 1 View- PORTABLE-Urgent (07.21.23 @ 12:41) >  IMPRESSION:    Biventricular ICD in place.    Mild, central pulmonary venous congestion, grossly unchanged  Left lower lung field opacity overlying hemidiaphragm, likely   representative of subsegmental atelectasis.    < end of copied text >               Subjective/Objective: Patient alert, awake , [place]ian speaking . Use  ID # [id number]    Tele event: Vpaced at 80s, NSVT    MEDICATIONS    losartan 25 milliGRAM(s) Oral daily  spironolactone 25 milliGRAM(s) Oral daily  acetaminophen     Tablet .. 650 milliGRAM(s) Oral every 6 hours PRN  pantoprazole    Tablet 40 milliGRAM(s) Oral before breakfast  polyethylene glycol 3350 17 Gram(s) Oral two times a day  dextrose 50% Injectable 25 Gram(s) IV Push once  dextrose 50% Injectable 12.5 Gram(s) IV Push once  dextrose 50% Injectable 25 Gram(s) IV Push once  dextrose Oral Gel 15 Gram(s) Oral once PRN  glucagon  Injectable 1 milliGRAM(s) IntraMuscular once  insulin lispro (ADMELOG) corrective regimen sliding scale   SubCutaneous three times a day before meals  insulin lispro (ADMELOG) corrective regimen sliding scale   SubCutaneous at bedtime  chlorhexidine 2% Cloths 1 Application(s) Topical <User Schedule>  dextrose 5%. 1000 milliLiter(s) IV Continuous <Continuous>  dextrose 5%. 1000 milliLiter(s) IV Continuous <Continuous>          ICU Vital Signs Last 24 Hrs  T(C): 36.6 (24 Jul 2023 08:00), Max: 37.7 (23 Jul 2023 20:11)  T(F): 97.9 (24 Jul 2023 08:00), Max: 99.8 (23 Jul 2023 20:11)  HR: 93 (24 Jul 2023 08:00) (81 - 100)  BP: 106/64 (24 Jul 2023 08:00) (82/54 - 118/78)  BP(mean): 73 (24 Jul 2023 08:00) (56 - 92)  RR: 23 (24 Jul 2023 08:00) (16 - 29)  SpO2: 99% (24 Jul 2023 08:00) (96% - 100%)    O2 Parameters below as of 24 Jul 2023 08:00  Patient On (Oxygen Delivery Method): room air            PHYSICAL EXAMINATION  Appearance: NAD, no distress  HEENT: Moist Mucous Membranes, Anicteric, PERRL, EOMI  Cardiovascular: Regular rate and rhythm, Normal S1 S2, No JVD, No murmurs  Respiratory: Lungs clear to auscultation. No rales, No rhonchi, No wheezing  Gastrointestinal:  Soft, Non-tender, + BS  Neurologic: Non-focal, A&Ox3  Skin: Warm and dry, No rashes, No ecchymosis, No cyanosis  Musculoskeletal: No clubbing, No cyanosis, No edema  Vascular: Peripheral pulses palpable 2+ bilaterally  Psychiatry: Mood & affect appropriate      	    		      I&O's Summary    23 Jul 2023 07:01  -  24 Jul 2023 07:00  --------------------------------------------------------  IN: 1010 mL / OUT: 1850 mL / NET: -840 mL    24 Jul 2023 07:01  -  24 Jul 2023 08:39  --------------------------------------------------------  IN: 200 mL / OUT: 150 mL / NET: 50 mL    	     LABS:	  LABORATORY VALUES	 	                          10.3   7.87  )-----------( 290      ( 24 Jul 2023 04:39 )             32.2       07-24    130<L>  |  96<L>  |  20  ----------------------------<  121<H>  4.2   |  24  |  0.76  07-23    129<L>  |  95<L>  |  16  ----------------------------<  124<H>  3.7   |  24  |  0.67    Ca    9.8      24 Jul 2023 04:39  Ca    9.6      23 Jul 2023 04:06  Phos  3.8     07-24  Phos  3.5     07-23  Mg     1.90     07-24  Mg     2.00     07-23    TPro  6.6  /  Alb  3.4  /  TBili  1.1  /  DBili  x   /  AST  23  /  ALT  19  /  AlkPhos  142<H>  07-24  TPro  6.4  /  Alb  3.4  /  TBili  1.4<H>  /  DBili  x   /  AST  26  /  ALT  21  /  AlkPhos  141<H>  07-23    LIVER FUNCTIONS - ( 24 Jul 2023 04:39 )  Alb: 3.4 g/dL / Pro: 6.6 g/dL / ALK PHOS: 142 U/L / ALT: 19 U/L / AST: 23 U/L / GGT: x           Prothrombin Time, Plasma: 15.3 sec (07-24 @ 04:39)      CARDIAC MARKERS:            Blood Gas Venous - Lactate: 0.9 mmol/L (07-24 @ 04:39)  Blood Gas Venous - Lactate: 2.9 mmol/L (07-23 @ 13:00)  Blood Gas Venous - Lactate: 1.0 mmol/L (07-23 @ 04:06)    07-18 @ 22:10  Cholesterol, Serum - 167  Direct LDL- --  HDL Cholesterol, Serum- 65  Triglycerides, Serum- 58      Thyroid Stimulating Hormone, Serum: 1.29 uIU/mL (07-18 @ 22:10)      Urinalysis Basic - ( 24 Jul 2023 04:39 )    Color: x / Appearance: x / SG: x / pH: x  Gluc: 121 mg/dL / Ketone: x  / Bili: x / Urobili: x   Blood: x / Protein: x / Nitrite: x   Leuk Esterase: x / RBC: x / WBC x   Sq Epi: x / Non Sq Epi: x / Bacteria: x      CAPILLARY BLOOD GLUCOSE      POCT Blood Glucose.: 133 mg/dL (24 Jul 2023 07:53)      < from: TTE with Doppler (w/Cont) (07.19.23 @ 14:44) >  DIMENSIONS:  Dimensions:     Normal Values:  LA:     5.5 cm    2.0 - 4.0 cm  Ao:     3.2 cm    2.0 - 3.8 cm  SEPTUM: 0.7 cm    0.6 - 1.2 cm  PWT:0.8 cm    0.6 - 1.1 cm  LVIDd:  7.8 cm    3.0 - 5.6 cm  LVIDs:  6.7 cm    1.8 - 4.0 cm  Derived Variables:  LVMI: 140 g/m2  RWT: 0.20  Fractional short: 14 %  Ejection Fraction (Modified Coffman Rule): 13 %  ------------------------------------------------------------------------  OBSERVATIONS:  Mitral Valve: Normal mitral valve. Severe  mitral  regurgitation with a vena contracta measured: 0.94 cm.  Aortic Root: Normal aortic root.  Aortic Valve: Calcified trileaflet aortic valve with normal  opening. Minimal aortic regurgitation.  Left Atrium: Severely dilated left atrium.  LA volume index  = 51 cc/m2.  Left Ventricle: Severe global left ventricular systolic  dysfunction. Severe global hypokinesis. LVEF calculated  using biplane Coffman's method is 13%. No left ventricular  thrombus. Severe left ventricular enlargement. Unable to  determine the severity of diastolic function due to severe  mitral regurgitation. Increased E/e'  is consistent with  elevated left ventricular filling pressure.  Right Heart: Normal right atrium. Normal right ventricular  size and function. A device wire is noted in the right  heart. Normal tricuspid valve.   Moderate tricuspid  regurgitation. Normal pulmonic valve.  Pericardium/PleuraNo pericardial effusion seen.  Hemodynamic: Estimated right ventricular systolic pressure  equals 58 mm Hg, assuming right atrial pressure equals 10  mm Hg, consistent with moderate pulmonary hypertension.    < end of copied text >    < from: Xray Chest 1 View- PORTABLE-Urgent (07.21.23 @ 12:41) >  IMPRESSION:    Biventricular ICD in place.    Mild, central pulmonary venous congestion, grossly unchanged  Left lower lung field opacity overlying hemidiaphragm, likely   representative of subsegmental atelectasis.    < end of copied text >

## 2023-07-24 NOTE — CHART NOTE - NSCHARTNOTEFT_GEN_A_CORE
CCU Transfer Note    Transfer from: CCU  Transfer to:  (  ) Medicine    (  ) Telemetry    (  ) RCU      Accepting physician:    HPI/ CCU course: 73F w/ pmhx  Afib (eliquis), ICD(placed at [facility] 5/2023), and HFrEF (10-15%) transferred from [facility] to [facility] CCU for BIV grade. Initially presented to [facility] with c/o SOB, found to have volume overload, admitted to their CCU for inotrope assisted diuresis. Also found to have LV thrombus on echo. Pt transferred to [facility] for BiV upgrade with Lasix drip at 10mg/hr, milrinone drip 0.375mcg/kg/min and heparin drip. Pt appeared euvolemic on arrival . Lasix drip was d/c. Lactate increase when  attempted to titrate down the milrinone . Echo showed EF 13%, Severe  mitral  regurgitation,  Severe global left ventricular systolic dysfunction. Patient underwent BiV upgrade in [facility] on 7/21.  Milrinone drip was  titrate off. pt on losartan and spirolactone. Case manger and  aware that pt has only Emergency Medicaid- undocumented from [place]. Patient follow with [facility] clinic. PT recs no home PT.        MEDICATIONS:  STANDING MEDICATIONS  chlorhexidine 2% Cloths 1 Application(s) Topical <User Schedule>  dextrose 5%. 1000 milliLiter(s) IV Continuous <Continuous>  dextrose 5%. 1000 milliLiter(s) IV Continuous <Continuous>  dextrose 50% Injectable 25 Gram(s) IV Push once  dextrose 50% Injectable 12.5 Gram(s) IV Push once  dextrose 50% Injectable 25 Gram(s) IV Push once  glucagon  Injectable 1 milliGRAM(s) IntraMuscular once  insulin lispro (ADMELOG) corrective regimen sliding scale   SubCutaneous three times a day before meals  insulin lispro (ADMELOG) corrective regimen sliding scale   SubCutaneous at bedtime  losartan 25 milliGRAM(s) Oral daily  pantoprazole    Tablet 40 milliGRAM(s) Oral before breakfast  polyethylene glycol 3350 17 Gram(s) Oral two times a day  spironolactone 25 milliGRAM(s) Oral daily    PRN MEDICATIONS  acetaminophen     Tablet .. 650 milliGRAM(s) Oral every 6 hours PRN  dextrose Oral Gel 15 Gram(s) Oral once PRN      VITAL SIGNS: Last 24 Hours  T(C): 36.7 (24 Jul 2023 12:00), Max: 37.7 (23 Jul 2023 20:11)  T(F): 98 (24 Jul 2023 12:00), Max: 99.8 (23 Jul 2023 20:11)  HR: 84 (24 Jul 2023 09:00) (81 - 97)  BP: 96/48 (24 Jul 2023 09:00) (82/54 - 110/40)  BP(mean): 60 (24 Jul 2023 09:00) (56 - 77)  RR: 25 (24 Jul 2023 09:00) (18 - 29)  SpO2: 97% (24 Jul 2023 09:00) (97% - 100%)    LABS:                        10.3   7.87  )-----------( 290      ( 24 Jul 2023 04:39 )             32.2     07-24    130<L>  |  96<L>  |  20  ----------------------------<  121<H>  4.2   |  24  |  0.76    Ca    9.8      24 Jul 2023 04:39  Phos  3.8     07-24  Mg     1.90     07-24    TPro  6.6  /  Alb  3.4  /  TBili  1.1  /  DBili  x   /  AST  23  /  ALT  19  /  AlkPhos  142<H>  07-24    PT/INR - ( 24 Jul 2023 04:39 )   PT: 15.3 sec;   INR: 1.32 ratio           Urinalysis Basic - ( 24 Jul 2023 04:39 )    Color: x / Appearance: x / SG: x / pH: x  Gluc: 121 mg/dL / Ketone: x  / Bili: x / Urobili: x   Blood: x / Protein: x / Nitrite: x   Leuk Esterase: x / RBC: x / WBC x   Sq Epi: x / Non Sq Epi: x / Bacteria: x      < from: TTE with Doppler (w/Cont) (07.19.23 @ 14:44) >    DIMENSIONS:  Dimensions:     Normal Values:  LA:     5.5 cm    2.0 - 4.0 cm  Ao:     3.2 cm    2.0 - 3.8 cm  SEPTUM: 0.7 cm    0.6 - 1.2 cm  PWT:0.8 cm    0.6 - 1.1 cm  LVIDd:  7.8 cm    3.0 - 5.6 cm  LVIDs:  6.7 cm    1.8 - 4.0 cm  Derived Variables:  LVMI: 140 g/m2  RWT: 0.20  Fractional short: 14 %  Ejection Fraction (Modified Coffman Rule): 13 %  ------------------------------------------------------------------------  OBSERVATIONS:  Mitral Valve: Normal mitral valve. Severe  mitral  regurgitation with a vena contracta measured: 0.94 cm.  Aortic Root: Normal aortic root.  Aortic Valve: Calcified trileaflet aortic valve with normal  opening. Minimal aortic regurgitation.  Left Atrium: Severely dilated left atrium.  LA volume index  = 51 cc/m2.  Left Ventricle: Severe global left ventricular systolic  dysfunction. Severe global hypokinesis. LVEF calculated  using biplane Coffman's method is 13%. No left ventricular  thrombus. Severe left ventricular enlargement. Unable to  determine the severity of diastolic function due to severe  mitral regurgitation. Increased E/e'  is consistent with  elevated left ventricular filling pressure.  Right Heart: Normal right atrium. Normal right ventricular  size and function. A device wire is noted in the right  heart. Normal tricuspid valve.   Moderate tricuspid  regurgitation. Normal pulmonic valve.  Pericardium/PleuraNo pericardial effusion seen.  Hemodynamic: Estimated right ventricular systolic pressure  equals 58 mm Hg, assuming right atrial pressure equals 10  mm Hg, consistent with moderate pulmonary hypertension.    < end of copied text >            ASSESSMENT & PLAN:     73F w/ PMHx:  Afib (Eliquis) ICD (placed at [facility] 5/2023), NICM, HLD and HFrEF transferred to [facility] CCU. Initially presented to [facility] with c/o SOB, found to have volume overload, admitted to their CCU for Primacor gtt and Lasix gtt. Patient transferred with Lasix drip at 10mg/hr, milrinone drip 0.375mcg/kg/min and heparin drip infusing at 950units/hr admitted to CCU for BIV upgrade.     NEUROLOGIC:  - AOx3  - No active issues, [place]an speaking    RESPIRATORY:  - Satting well on room air,   - No active issues    CARDIOVASCULAR:  Acute on chronic systolic heart failure   - Echo at [facility] on 7/11 shows EF 10-15%, severe MR, severe TR, +LV thrombus  - TTE this admission- EF 13%, no LV thrombus, elevated LV filling pressure  - s/p  milrinone drip titrate off on 7/23  - s/p diuresis with Lasix gtt, Lasix 60 mg IVP BID d/cd  - Not volume overloaded on exam and no JVD noted  - Continue Aldactone 25mg daily   - Increased losartan 25mg PO daily   - Last 24 hrs:   - Lactate 1.2 -> 2.4 -> 2.1--> 1.4 -->1.0   - Monitor I&Os and daily standing weights   - s/p BIV upgraded 7/21  -IN: 200 mL / OUT: 150 mL / NET: 50 mL    LV thrombus   - History of LV thrombus on echo in  [facility]  - TTE here  shows no evidence of LV thrombus  - started on coumadin   -will repeat echo to assess for LV thrombus - if no LV thrombus , will start on eliquis      Afib  - CHADS-VASc 3  - awating for echo to assess  LV thrombus - if no LV thrombus , will start on eliquis    GASTROINTESTINAL:  - DASH/TLC diet  - Continue PPI    /RENAL:  Hyponatremia this admission  - jann in the setting of chronic HF  -improving  - Continue to monitor CMP  - celeste Lisa I&Os   -IN: 200 mL / OUT: 150 mL / NET: 50 mL    ENDOCRINE:  #T2DM  - A1C 6.9  - ISS    DVT PPX:  - On AC         pt to follow in out pt clinic due to undocumented and only has emergency medicaid CCU Transfer Note    Transfer from: CCU  Transfer to:  (x  ) Medicine    (  ) Telemetry    (  ) RCU      Accepting physician: Dr Octaviano Myles  ACP sign out:    HPI/ CCU course: 73F w/ pmhx  Afib (eliquis), ICD(placed at [place] 5/2023), and HFrEF (10-15%) transferred from [facility] to [facility] CCU for BIV grade. Initially presented to [facility] with c/o SOB, found to have volume overload, admitted to their CCU for inotrope assisted diuresis. Also found to have LV thrombus on echo. Pt transferred to [facility] for BiV upgrade with Lasix drip at 10mg/hr, milrinone drip 0.375mcg/kg/min and heparin drip. Pt appeared euvolemic on arrival . Lasix drip was d/c. Lactate increase when  attempted to titrate down the milrinone . Echo showed EF 13%, Severe  mitral  regurgitation,  Severe global left ventricular systolic dysfunction. Patient underwent BiV upgrade in [facility] on 7/21.  Milrinone drip was  titrate off on 7/24 . pt on losartan and spirolactone. Case manger and  aware that pt has only Emergency Medicaid- undocumented from [place]. Patient follow with [facility] clinic. PT recs no home PT.        MEDICATIONS:  STANDING MEDICATIONS  chlorhexidine 2% Cloths 1 Application(s) Topical <User Schedule>  dextrose 5%. 1000 milliLiter(s) IV Continuous <Continuous>  dextrose 5%. 1000 milliLiter(s) IV Continuous <Continuous>  dextrose 50% Injectable 25 Gram(s) IV Push once  dextrose 50% Injectable 12.5 Gram(s) IV Push once  dextrose 50% Injectable 25 Gram(s) IV Push once  glucagon  Injectable 1 milliGRAM(s) IntraMuscular once  insulin lispro (ADMELOG) corrective regimen sliding scale   SubCutaneous three times a day before meals  insulin lispro (ADMELOG) corrective regimen sliding scale   SubCutaneous at bedtime  losartan 25 milliGRAM(s) Oral daily  pantoprazole    Tablet 40 milliGRAM(s) Oral before breakfast  polyethylene glycol 3350 17 Gram(s) Oral two times a day  spironolactone 25 milliGRAM(s) Oral daily    PRN MEDICATIONS  acetaminophen     Tablet .. 650 milliGRAM(s) Oral every 6 hours PRN  dextrose Oral Gel 15 Gram(s) Oral once PRN      VITAL SIGNS: Last 24 Hours  T(C): 36.7 (24 Jul 2023 12:00), Max: 37.7 (23 Jul 2023 20:11)  T(F): 98 (24 Jul 2023 12:00), Max: 99.8 (23 Jul 2023 20:11)  HR: 84 (24 Jul 2023 09:00) (81 - 97)  BP: 96/48 (24 Jul 2023 09:00) (82/54 - 110/40)  BP(mean): 60 (24 Jul 2023 09:00) (56 - 77)  RR: 25 (24 Jul 2023 09:00) (18 - 29)  SpO2: 97% (24 Jul 2023 09:00) (97% - 100%)    LABS:                        10.3   7.87  )-----------( 290      ( 24 Jul 2023 04:39 )             32.2     07-24    130<L>  |  96<L>  |  20  ----------------------------<  121<H>  4.2   |  24  |  0.76    Ca    9.8      24 Jul 2023 04:39  Phos  3.8     07-24  Mg     1.90     07-24    TPro  6.6  /  Alb  3.4  /  TBili  1.1  /  DBili  x   /  AST  23  /  ALT  19  /  AlkPhos  142<H>  07-24    PT/INR - ( 24 Jul 2023 04:39 )   PT: 15.3 sec;   INR: 1.32 ratio           Urinalysis Basic - ( 24 Jul 2023 04:39 )    Color: x / Appearance: x / SG: x / pH: x  Gluc: 121 mg/dL / Ketone: x  / Bili: x / Urobili: x   Blood: x / Protein: x / Nitrite: x   Leuk Esterase: x / RBC: x / WBC x   Sq Epi: x / Non Sq Epi: x / Bacteria: x      < from: TTE with Doppler (w/Cont) (07.19.23 @ 14:44) >    DIMENSIONS:  Dimensions:     Normal Values:  LA:     5.5 cm    2.0 - 4.0 cm  Ao:     3.2 cm    2.0 - 3.8 cm  SEPTUM: 0.7 cm    0.6 - 1.2 cm  PWT:0.8 cm    0.6 - 1.1 cm  LVIDd:  7.8 cm    3.0 - 5.6 cm  LVIDs:  6.7 cm    1.8 - 4.0 cm  Derived Variables:  LVMI: 140 g/m2  RWT: 0.20  Fractional short: 14 %  Ejection Fraction (Modified Coffman Rule): 13 %  ------------------------------------------------------------------------  OBSERVATIONS:  Mitral Valve: Normal mitral valve. Severe  mitral  regurgitation with a vena contracta measured: 0.94 cm.  Aortic Root: Normal aortic root.  Aortic Valve: Calcified trileaflet aortic valve with normal  opening. Minimal aortic regurgitation.  Left Atrium: Severely dilated left atrium.  LA volume index  = 51 cc/m2.  Left Ventricle: Severe global left ventricular systolic  dysfunction. Severe global hypokinesis. LVEF calculated  using biplane Coffman's method is 13%. No left ventricular  thrombus. Severe left ventricular enlargement. Unable to  determine the severity of diastolic function due to severe  mitral regurgitation. Increased E/e'  is consistent with  elevated left ventricular filling pressure.  Right Heart: Normal right atrium. Normal right ventricular  size and function. A device wire is noted in the right  heart. Normal tricuspid valve.   Moderate tricuspid  regurgitation. Normal pulmonic valve.  Pericardium/PleuraNo pericardial effusion seen.  Hemodynamic: Estimated right ventricular systolic pressure  equals 58 mm Hg, assuming right atrial pressure equals 10  mm Hg, consistent with moderate pulmonary hypertension.    < end of copied text >            ASSESSMENT & PLAN:     73F w/ PMHx:  Afib (Eliquis) ICD (placed at [place] 5/2023), NICM, HLD and HFrEF transferred to [facility] CCU. Initially presented to [facility] with c/o SOB, found to have volume overload, admitted to their CCU for Primacor gtt and Lasix gtt. Patient transferred with Lasix drip at 10mg/hr, milrinone drip 0.375mcg/kg/min and heparin drip infusing at 950units/hr admitted to CCU for BIV upgrade.     NEUROLOGIC:  - AOx3  - No active issues, Danish speaking    RESPIRATORY:  - Satting well on room air,   - No active issues    CARDIOVASCULAR:  Acute on chronic systolic heart failure   - Echo at [facility] on 7/11 shows EF 10-15%, severe MR, severe TR, +LV thrombus  - TTE this admission- EF 13%, no LV thrombus, elevated LV filling pressure  - s/p  milrinone drip titrate off on 7/23  - s/p diuresis with Lasix gtt, Lasix 60 mg IVP BID d/cd  - Not volume overloaded on exam and no JVD noted  - Continue Aldactone 25mg daily   - Increased losartan 25mg PO daily   - Last 24 hrs:   - Lactate 1.2 -> 2.4 -> 2.1--> 1.4 -->1.0   - Monitor I&Os and daily standing weights   - s/p BIV upgraded 7/21  -IN: 200 mL / OUT: 150 mL / NET: 50 mL    LV thrombus   - History of LV thrombus on echo in  [facility]  - TTE here  shows no evidence of LV thrombus  - started on coumadin   -will repeat echo to assess for LV thrombus - if no LV thrombus , will start on eliquis      Afib  - CHADS-VASc 3  - awating for echo to assess  LV thrombus - if no LV thrombus , will start on eliquis    GASTROINTESTINAL:  - DASH/TLC diet  - Continue PPI    /RENAL:  Hyponatremia this admission  - jenniferley in the setting of chronic HF  -improving  - Continue to monitor CMP  - Lisa, strict I&Os   -IN: 200 mL / OUT: 150 mL / NET: 50 mL    ENDOCRINE:  #T2DM  - A1C 6.9  - ISS    DVT PPX:  - On AC       []  monitor Blood Pressure  one more day to see if BB can be introduce in pt   pt to follow in out pt clinic due to undocumented and only has emergency medicaid CCU Transfer Note    Transfer from: CCU  Transfer to:  (x  ) Medicine    (  ) Telemetry    (  ) RCU      Accepting physician: Dr Octaviano Myles  ACP sign out:    HPI/ CCU course: 73F w/ pmhx  Afib (eliquis), ICD(placed at [place] 5/2023), and HFrEF (10-15%) transferred from [facility] to [facility] CCU for BIV grade. Initially presented to [facility] with c/o SOB, found to have volume overload, admitted to their CCU for inotrope assisted diuresis. Also found to have LV thrombus on echo. Pt transferred to [facility] for BiV upgrade with Lasix drip at 10mg/hr, milrinone drip 0.375mcg/kg/min and heparin drip. Pt appeared euvolemic on arrival . Lasix drip was d/c. Lactate increase when  attempted to titrate down the milrinone . Echo showed EF 13%, Severe  mitral  regurgitation,  Severe global left ventricular systolic dysfunction. Patient underwent BiV upgrade in [facility] on 7/21.  Milrinone drip was  titrate off on 7/24 . pt on losartan and spirolactone. Case manger and  aware that pt has only Emergency Medicaid- undocumented from [place]. Patient follow with [facility] clinic. PT recs no home PT.        MEDICATIONS:  STANDING MEDICATIONS  chlorhexidine 2% Cloths 1 Application(s) Topical <User Schedule>  dextrose 5%. 1000 milliLiter(s) IV Continuous <Continuous>  dextrose 5%. 1000 milliLiter(s) IV Continuous <Continuous>  dextrose 50% Injectable 25 Gram(s) IV Push once  dextrose 50% Injectable 12.5 Gram(s) IV Push once  dextrose 50% Injectable 25 Gram(s) IV Push once  glucagon  Injectable 1 milliGRAM(s) IntraMuscular once  insulin lispro (ADMELOG) corrective regimen sliding scale   SubCutaneous three times a day before meals  insulin lispro (ADMELOG) corrective regimen sliding scale   SubCutaneous at bedtime  losartan 25 milliGRAM(s) Oral daily  pantoprazole    Tablet 40 milliGRAM(s) Oral before breakfast  polyethylene glycol 3350 17 Gram(s) Oral two times a day  spironolactone 25 milliGRAM(s) Oral daily    PRN MEDICATIONS  acetaminophen     Tablet .. 650 milliGRAM(s) Oral every 6 hours PRN  dextrose Oral Gel 15 Gram(s) Oral once PRN      VITAL SIGNS: Last 24 Hours  T(C): 36.7 (24 Jul 2023 12:00), Max: 37.7 (23 Jul 2023 20:11)  T(F): 98 (24 Jul 2023 12:00), Max: 99.8 (23 Jul 2023 20:11)  HR: 84 (24 Jul 2023 09:00) (81 - 97)  BP: 96/48 (24 Jul 2023 09:00) (82/54 - 110/40)  BP(mean): 60 (24 Jul 2023 09:00) (56 - 77)  RR: 25 (24 Jul 2023 09:00) (18 - 29)  SpO2: 97% (24 Jul 2023 09:00) (97% - 100%)    LABS:                        10.3   7.87  )-----------( 290      ( 24 Jul 2023 04:39 )             32.2     07-24    130<L>  |  96<L>  |  20  ----------------------------<  121<H>  4.2   |  24  |  0.76    Ca    9.8      24 Jul 2023 04:39  Phos  3.8     07-24  Mg     1.90     07-24    TPro  6.6  /  Alb  3.4  /  TBili  1.1  /  DBili  x   /  AST  23  /  ALT  19  /  AlkPhos  142<H>  07-24    PT/INR - ( 24 Jul 2023 04:39 )   PT: 15.3 sec;   INR: 1.32 ratio           Urinalysis Basic - ( 24 Jul 2023 04:39 )    Color: x / Appearance: x / SG: x / pH: x  Gluc: 121 mg/dL / Ketone: x  / Bili: x / Urobili: x   Blood: x / Protein: x / Nitrite: x   Leuk Esterase: x / RBC: x / WBC x   Sq Epi: x / Non Sq Epi: x / Bacteria: x      < from: TTE with Doppler (w/Cont) (07.19.23 @ 14:44) >    DIMENSIONS:  Dimensions:     Normal Values:  LA:     5.5 cm    2.0 - 4.0 cm  Ao:     3.2 cm    2.0 - 3.8 cm  SEPTUM: 0.7 cm    0.6 - 1.2 cm  PWT:0.8 cm    0.6 - 1.1 cm  LVIDd:  7.8 cm    3.0 - 5.6 cm  LVIDs:  6.7 cm    1.8 - 4.0 cm  Derived Variables:  LVMI: 140 g/m2  RWT: 0.20  Fractional short: 14 %  Ejection Fraction (Modified Coffman Rule): 13 %  ------------------------------------------------------------------------  OBSERVATIONS:  Mitral Valve: Normal mitral valve. Severe  mitral  regurgitation with a vena contracta measured: 0.94 cm.  Aortic Root: Normal aortic root.  Aortic Valve: Calcified trileaflet aortic valve with normal  opening. Minimal aortic regurgitation.  Left Atrium: Severely dilated left atrium.  LA volume index  = 51 cc/m2.  Left Ventricle: Severe global left ventricular systolic  dysfunction. Severe global hypokinesis. LVEF calculated  using biplane Coffman's method is 13%. No left ventricular  thrombus. Severe left ventricular enlargement. Unable to  determine the severity of diastolic function due to severe  mitral regurgitation. Increased E/e'  is consistent with  elevated left ventricular filling pressure.  Right Heart: Normal right atrium. Normal right ventricular  size and function. A device wire is noted in the right  heart. Normal tricuspid valve.   Moderate tricuspid  regurgitation. Normal pulmonic valve.  Pericardium/PleuraNo pericardial effusion seen.  Hemodynamic: Estimated right ventricular systolic pressure  equals 58 mm Hg, assuming right atrial pressure equals 10  mm Hg, consistent with moderate pulmonary hypertension.    < end of copied text >            ASSESSMENT & PLAN:     73F w/ PMHx:  Afib (Eliquis) ICD (placed at [place] 5/2023), NICM, HLD and HFrEF transferred to [facility] CCU. Initially presented to [facility] with c/o SOB, found to have volume overload, admitted to their CCU for Primacor gtt and Lasix gtt. Patient transferred with Lasix drip at 10mg/hr, milrinone drip 0.375mcg/kg/min and heparin drip infusing at 950units/hr admitted to CCU for BIV upgrade.     NEUROLOGIC:  - AOx3  - No active issues, Green[place]ic speaking    RESPIRATORY:  - Satting well on room air,   - No active issues    CARDIOVASCULAR:  Acute on chronic systolic heart failure   - Echo at [facility] on 7/11 shows EF 10-15%, severe MR, severe TR, +LV thrombus  - TTE this admission- EF 13%, no LV thrombus, elevated LV filling pressure  - s/p  milrinone drip titrate off on 7/23  - s/p diuresis with Lasix gtt, Lasix 60 mg IVP BID d/cd  - Not volume overloaded on exam and no JVD noted  - Continue Aldactone 25mg daily   - Increased losartan 25mg PO daily   - Lactate normalized  - Monitor I&Os and daily standing weights   - s/p BIV upgraded 7/21  -IN: 200 mL / OUT: 150 mL / NET: 50 mL    LV thrombus   - History of LV thrombus on echo in  [facility]  - TTE here  shows no evidence of LV thrombus  - repeat echo showed no LV thrombus  LV thrombus  - d/c coumadin         Afib  - CHADS-VASc 3  - No LV thrombus on echo x2   - will start on eliquis    GASTROINTESTINAL:  - DASH/TLC diet  - Continue PPI    /RENAL:  Hyponatremia this admission  - likley in the setting of chronic HF  -improving  - Continue to monitor CMP  - Lisa, strict I&Os   -IN: 200 mL / OUT: 150 mL / NET: 50 mL    ENDOCRINE:  #T2DM  - A1C 6.9  - ISS    DVT PPX:  - On AC       []  monitor Blood Pressure  one more day to see if BB can be introduce in pt   pt to follow in out pt clinic due to undocumented and only has emergency medicaid CCU Transfer Note    Transfer from: CCU  Transfer to:  (x  ) Medicine    (  ) Telemetry    (  ) RCU      Accepting physician: Dr Octaviano Myles  ACP sign out:    HPI/ CCU course: 73F w/ pmhx  Afib (eliquis), ICD(placed at [place] 5/2023), and HFrEF (10-15%) transferred from [facility] to [facility] CCU for BIV grade. Initially presented to [facility] with c/o SOB, found to have volume overload, admitted to their CCU for inotrope assisted diuresis. Also found to have LV thrombus on echo. Pt transferred to [facility] for BiV upgrade with Lasix drip at 10mg/hr, milrinone drip 0.375mcg/kg/min and heparin drip. Pt appeared euvolemic on arrival . Lasix drip was d/c. Lactate increase when  attempted to titrate down the milrinone . Echo showed EF 13%, Severe  mitral  regurgitation,  Severe global left ventricular systolic dysfunction. Patient underwent BiV upgrade in [facility] on 7/21.  Milrinone drip was  titrate off on 7/24 . pt on losartan and spirolactone. Case manger and  aware that pt has only Emergency Medicaid- undocumented from [place]. Patient follow with [facility]. PT recs no home PT.  Report given to MAR and BRAULIO for 4N.        MEDICATIONS:  STANDING MEDICATIONS  chlorhexidine 2% Cloths 1 Application(s) Topical <User Schedule>  dextrose 5%. 1000 milliLiter(s) IV Continuous <Continuous>  dextrose 5%. 1000 milliLiter(s) IV Continuous <Continuous>  dextrose 50% Injectable 25 Gram(s) IV Push once  dextrose 50% Injectable 12.5 Gram(s) IV Push once  dextrose 50% Injectable 25 Gram(s) IV Push once  glucagon  Injectable 1 milliGRAM(s) IntraMuscular once  insulin lispro (ADMELOG) corrective regimen sliding scale   SubCutaneous three times a day before meals  insulin lispro (ADMELOG) corrective regimen sliding scale   SubCutaneous at bedtime  losartan 25 milliGRAM(s) Oral daily  pantoprazole    Tablet 40 milliGRAM(s) Oral before breakfast  polyethylene glycol 3350 17 Gram(s) Oral two times a day  spironolactone 25 milliGRAM(s) Oral daily    PRN MEDICATIONS  acetaminophen     Tablet .. 650 milliGRAM(s) Oral every 6 hours PRN  dextrose Oral Gel 15 Gram(s) Oral once PRN      VITAL SIGNS: Last 24 Hours  T(C): 36.7 (24 Jul 2023 12:00), Max: 37.7 (23 Jul 2023 20:11)  T(F): 98 (24 Jul 2023 12:00), Max: 99.8 (23 Jul 2023 20:11)  HR: 84 (24 Jul 2023 09:00) (81 - 97)  BP: 96/48 (24 Jul 2023 09:00) (82/54 - 110/40)  BP(mean): 60 (24 Jul 2023 09:00) (56 - 77)  RR: 25 (24 Jul 2023 09:00) (18 - 29)  SpO2: 97% (24 Jul 2023 09:00) (97% - 100%)    LABS:                        10.3   7.87  )-----------( 290      ( 24 Jul 2023 04:39 )             32.2     07-24    130<L>  |  96<L>  |  20  ----------------------------<  121<H>  4.2   |  24  |  0.76    Ca    9.8      24 Jul 2023 04:39  Phos  3.8     07-24  Mg     1.90     07-24    TPro  6.6  /  Alb  3.4  /  TBili  1.1  /  DBili  x   /  AST  23  /  ALT  19  /  AlkPhos  142<H>  07-24    PT/INR - ( 24 Jul 2023 04:39 )   PT: 15.3 sec;   INR: 1.32 ratio           Urinalysis Basic - ( 24 Jul 2023 04:39 )    Color: x / Appearance: x / SG: x / pH: x  Gluc: 121 mg/dL / Ketone: x  / Bili: x / Urobili: x   Blood: x / Protein: x / Nitrite: x   Leuk Esterase: x / RBC: x / WBC x   Sq Epi: x / Non Sq Epi: x / Bacteria: x      < from: TTE with Doppler (w/Cont) (07.19.23 @ 14:44) >    DIMENSIONS:  Dimensions:     Normal Values:  LA:     5.5 cm    2.0 - 4.0 cm  Ao:     3.2 cm    2.0 - 3.8 cm  SEPTUM: 0.7 cm    0.6 - 1.2 cm  PWT:0.8 cm    0.6 - 1.1 cm  LVIDd:  7.8 cm    3.0 - 5.6 cm  LVIDs:  6.7 cm    1.8 - 4.0 cm  Derived Variables:  LVMI: 140 g/m2  RWT: 0.20  Fractional short: 14 %  Ejection Fraction (Modified Coffman Rule): 13 %  ------------------------------------------------------------------------  OBSERVATIONS:  Mitral Valve: Normal mitral valve. Severe  mitral  regurgitation with a vena contracta measured: 0.94 cm.  Aortic Root: Normal aortic root.  Aortic Valve: Calcified trileaflet aortic valve with normal  opening. Minimal aortic regurgitation.  Left Atrium: Severely dilated left atrium.  LA volume index  = 51 cc/m2.  Left Ventricle: Severe global left ventricular systolic  dysfunction. Severe global hypokinesis. LVEF calculated  using biplane Coffman's method is 13%. No left ventricular  thrombus. Severe left ventricular enlargement. Unable to  determine the severity of diastolic function due to severe  mitral regurgitation. Increased E/e'  is consistent with  elevated left ventricular filling pressure.  Right Heart: Normal right atrium. Normal right ventricular  size and function. A device wire is noted in the right  heart. Normal tricuspid valve.   Moderate tricuspid  regurgitation. Normal pulmonic valve.  Pericardium/PleuraNo pericardial effusion seen.  Hemodynamic: Estimated right ventricular systolic pressure  equals 58 mm Hg, assuming right atrial pressure equals 10  mm Hg, consistent with moderate pulmonary hypertension.    < end of copied text >            ASSESSMENT & PLAN:     73F w/ PMHx:  Afib (Eliquis) ICD (placed at [place] 5/2023), NICM, HLD and HFrEF transferred to [facility] CCU. Initially presented to [facility] with c/o SOB, found to have volume overload, admitted to their CCU for Primacor gtt and Lasix gtt. Patient transferred with Lasix drip at 10mg/hr, milrinone drip 0.375mcg/kg/min and heparin drip infusing at 950units/hr admitted to CCU for BIV upgrade.     NEUROLOGIC:  - AOx3  - No active issues, Ukrainian speaking    RESPIRATORY:  - Satting well on room air,   - No active issues    CARDIOVASCULAR:  Acute on chronic systolic heart failure   - Echo at [facility] on 7/11 shows EF 10-15%, severe MR, severe TR, +LV thrombus  - TTE this admission- EF 13%, no LV thrombus, elevated LV filling pressure  - s/p  milrinone drip titrate off on 7/23  - s/p diuresis with Lasix gtt, Lasix 60 mg IVP BID d/cd  - Not volume overloaded on exam and no JVD noted  - Continue Aldactone 25mg daily   - Increased losartan 25mg PO daily   - Lactate normalized  - Monitor I&Os and daily standing weights   - s/p BIV upgraded 7/21  -IN: 200 mL / OUT: 150 mL / NET: 50 mL    LV thrombus   - History of LV thrombus on echo in  [facility]  - TTE here  shows no evidence of LV thrombus  - repeat echo showed no LV thrombus  LV thrombus  - d/c coumadin         Afib  - CHADS-VASc 3  - No LV thrombus on echo x2   - will start on eliquis    GASTROINTESTINAL:  - DASH/TLC diet  - Continue PPI    /RENAL:  Hyponatremia this admission  - jann in the setting of chronic HF  -improving  - Continue to monitor CMP  - Lisa strict I&Os   -IN: 200 mL / OUT: 150 mL / NET: 50 mL    ENDOCRINE:  #T2DM  - A1C 6.9  - ISS    DVT PPX:  - On AC       []  monitor Blood Pressure  one more day to see if BB can be introduce in pt   pt to follow in out pt clinic due to undocumented and only has emergency medicaid

## 2023-07-24 NOTE — PROGRESS NOTE ADULT - ASSESSMENT
73F w/ PMHx:  Afib (Eliquis) ICD (placed at [place] 5/2023), NICM, HLD and HFrEF transferred to [facility] CCU. Initially presented to [facility] with c/o SOB, found to have volume overload, admitted to their CCU for Primacor gtt and Lasix gtt. Patient transferred with Lasix drip at 10mg/hr, milrinone drip 0.375mcg/kg/min and heparin drip infusing at 950units/hr admitted to CCU for BIV upgrade.     NEUROLOGIC:  - AOx3  - No active issues, Italian speaking    RESPIRATORY:  - Satting well on room air,   - No active issues    CARDIOVASCULAR:  Acute on chronic systolic heart failure   - Echo at [facility] on 7/11 shows EF 10-15%, severe MR, severe TR, +LV thrombus  - TTE this admission- EF 13%, no LV thrombus, elevated LV filling pressure  - s/p  milrinone drip titrate off on 7/23  - s/p diuresis with Lasix gtt, Lasix 60 mg IVP BID d/cd  - Not volume overloaded on exam and no JVD noted  - Continue Aldactone 25mg daily   - Increased losartan 25mg PO daily   - Last 24 hrs:   - Lactate 1.2 -> 2.4 -> 2.1--> 1.4 -->1.0   - Monitor I&Os and daily standing weights   - s/p BIV upgraded 7/21  -IN: 200 mL / OUT: 150 mL / NET: 50 mL    LV thrombus   - History of LV thrombus from [facility] admission  - TTE this admission shows no evidence of LV thrombus  - Heparin gtt on hold   -daily Coumadin  -daily INR    Afib  - CHADS-VASc 3  - Switched home Eliquis to coumadin, now on coumadin     GASTROINTESTINAL:  - DASH/TLC diet  - Continue PPI    /RENAL:  Hyponatremia this admission  - Na lowest overnight at 122 -> 126 -> 123 ->126 -->129--> 130  - Continue to monitor CMP  - Lisa, strict I&Os   -IN: 200 mL / OUT: 150 mL / NET: 50 mL    ENDOCRINE:  #T2DM  - A1C 6.9  - ISS    DVT PPX:  - On AC  73F w/ PMHx:  Afib (Eliquis) ICD (placed at [place] 5/2023), NICM, HLD and HFrEF transferred to [facility] CCU. Initially presented to [facility] with c/o SOB, found to have volume overload, admitted to their CCU for Primacor gtt and Lasix gtt. Patient transferred with Lasix drip at 10mg/hr, milrinone drip 0.375mcg/kg/min and heparin drip infusing at 950units/hr admitted to CCU for BIV upgrade.     NEUROLOGIC:  - AOx3  - No active issues, Japanese speaking    RESPIRATORY:  - Satting well on room air,   - No active issues    CARDIOVASCULAR:  Acute on chronic systolic heart failure   - Echo at [facility] on 7/11 shows EF 10-15%, severe MR, severe TR, +LV thrombus  - TTE this admission- EF 13%, no LV thrombus, elevated LV filling pressure  - s/p  milrinone drip titrate off on 7/23  - s/p diuresis with Lasix gtt, Lasix 60 mg IVP BID d/cd  - Not volume overloaded on exam and no JVD noted  - Continue Aldactone 25mg daily   - Increased losartan 25mg PO daily   - s/p BIV upgraded 7/21  - Lactate 1.2 -> 2.4 -> 2.1--> 1.4 -->1.0   - Monitor I&Os and daily standing weights     -IN: 200 mL / OUT: 150 mL / NET: 50 mL    LV thrombus   - History of LV thrombus from [facility] admission  - TTE reported  at [facility] on  admission shows evidence of LV thrombus- started on coumadin   - Echo in [facility] x2 showed no LV thrombus  - switch coumadin to eliquis starting 7/25      Afib  - CHADS-VASc 3  - start Eliquis on  7/25    GASTROINTESTINAL:  - DASH/TLC diet  - Continue PPI    /RENAL:  Hyponatremia this admission  - Na lowest overnight at 122 -> 126 -> 123 ->126 -->129--> 130  - Continue to monitor CMP  - Lisa, strict I&Os   -IN: 200 mL / OUT: 150 mL / NET: 50 mL    ENDOCRINE:  #T2DM  - A1C 6.9  - ISS    DVT PPX:  - On AC

## 2023-07-25 ENCOUNTER — TRANSCRIPTION ENCOUNTER (OUTPATIENT)
Age: 73
End: 2023-07-25

## 2023-07-25 VITALS — WEIGHT: 134.04 LBS

## 2023-07-25 LAB
ANION GAP SERPL CALC-SCNC: 9 MMOL/L — SIGNIFICANT CHANGE UP (ref 7–14)
BUN SERPL-MCNC: 19 MG/DL — SIGNIFICANT CHANGE UP (ref 7–23)
CALCIUM SERPL-MCNC: 9.6 MG/DL — SIGNIFICANT CHANGE UP (ref 8.4–10.5)
CHLORIDE SERPL-SCNC: 97 MMOL/L — LOW (ref 98–107)
CO2 SERPL-SCNC: 24 MMOL/L — SIGNIFICANT CHANGE UP (ref 22–31)
CREAT SERPL-MCNC: 0.63 MG/DL — SIGNIFICANT CHANGE UP (ref 0.5–1.3)
EGFR: 93 ML/MIN/1.73M2 — SIGNIFICANT CHANGE UP
GLUCOSE BLDC GLUCOMTR-MCNC: 129 MG/DL — HIGH (ref 70–99)
GLUCOSE BLDC GLUCOMTR-MCNC: 141 MG/DL — HIGH (ref 70–99)
GLUCOSE BLDC GLUCOMTR-MCNC: 199 MG/DL — HIGH (ref 70–99)
GLUCOSE SERPL-MCNC: 124 MG/DL — HIGH (ref 70–99)
HCT VFR BLD CALC: 32 % — LOW (ref 34.5–45)
HGB BLD-MCNC: 10 G/DL — LOW (ref 11.5–15.5)
LACTATE SERPL-SCNC: 1.7 MMOL/L — SIGNIFICANT CHANGE UP (ref 0.5–2)
MAGNESIUM SERPL-MCNC: 1.7 MG/DL — SIGNIFICANT CHANGE UP (ref 1.6–2.6)
MCHC RBC-ENTMCNC: 27.1 PG — SIGNIFICANT CHANGE UP (ref 27–34)
MCHC RBC-ENTMCNC: 31.3 GM/DL — LOW (ref 32–36)
MCV RBC AUTO: 86.7 FL — SIGNIFICANT CHANGE UP (ref 80–100)
NRBC # BLD: 0 /100 WBCS — SIGNIFICANT CHANGE UP (ref 0–0)
NRBC # FLD: 0 K/UL — SIGNIFICANT CHANGE UP (ref 0–0)
PHOSPHATE SERPL-MCNC: 3.7 MG/DL — SIGNIFICANT CHANGE UP (ref 2.5–4.5)
PLATELET # BLD AUTO: 329 K/UL — SIGNIFICANT CHANGE UP (ref 150–400)
POTASSIUM SERPL-MCNC: 4.4 MMOL/L — SIGNIFICANT CHANGE UP (ref 3.5–5.3)
POTASSIUM SERPL-SCNC: 4.4 MMOL/L — SIGNIFICANT CHANGE UP (ref 3.5–5.3)
RBC # BLD: 3.69 M/UL — LOW (ref 3.8–5.2)
RBC # FLD: 20.4 % — HIGH (ref 10.3–14.5)
SODIUM SERPL-SCNC: 130 MMOL/L — LOW (ref 135–145)
WBC # BLD: 6.6 K/UL — SIGNIFICANT CHANGE UP (ref 3.8–10.5)
WBC # FLD AUTO: 6.6 K/UL — SIGNIFICANT CHANGE UP (ref 3.8–10.5)

## 2023-07-25 PROCEDURE — 99239 HOSP IP/OBS DSCHRG MGMT >30: CPT

## 2023-07-25 PROCEDURE — 99231 SBSQ HOSP IP/OBS SF/LOW 25: CPT

## 2023-07-25 PROCEDURE — 99233 SBSQ HOSP IP/OBS HIGH 50: CPT

## 2023-07-25 RX ORDER — APIXABAN 2.5 MG/1
1 TABLET, FILM COATED ORAL
Qty: 60 | Refills: 0
Start: 2023-07-25 | End: 2023-08-23

## 2023-07-25 RX ORDER — ASPIRIN/CALCIUM CARB/MAGNESIUM 324 MG
1 TABLET ORAL
Refills: 0 | DISCHARGE

## 2023-07-25 RX ORDER — BUMETANIDE 0.25 MG/ML
1 INJECTION INTRAMUSCULAR; INTRAVENOUS
Qty: 0 | Refills: 0 | DISCHARGE
Start: 2023-07-25

## 2023-07-25 RX ORDER — FUROSEMIDE 40 MG
1 TABLET ORAL
Qty: 30 | Refills: 0
Start: 2023-07-25 | End: 2023-08-23

## 2023-07-25 RX ORDER — LISINOPRIL 2.5 MG/1
1 TABLET ORAL
Refills: 0 | DISCHARGE

## 2023-07-25 RX ORDER — MIDODRINE HYDROCHLORIDE 2.5 MG/1
2 TABLET ORAL
Refills: 0 | DISCHARGE

## 2023-07-25 RX ORDER — LOSARTAN POTASSIUM 100 MG/1
1 TABLET, FILM COATED ORAL
Qty: 30 | Refills: 0
Start: 2023-07-25 | End: 2023-08-23

## 2023-07-25 RX ORDER — SPIRONOLACTONE 25 MG/1
1 TABLET, FILM COATED ORAL
Qty: 30 | Refills: 0
Start: 2023-07-25 | End: 2023-08-23

## 2023-07-25 RX ORDER — POTASSIUM CHLORIDE 20 MEQ
1 PACKET (EA) ORAL
Refills: 0 | DISCHARGE

## 2023-07-25 RX ORDER — APIXABAN 2.5 MG/1
1 TABLET, FILM COATED ORAL
Refills: 0 | DISCHARGE

## 2023-07-25 RX ORDER — BUMETANIDE 0.25 MG/ML
1 INJECTION INTRAMUSCULAR; INTRAVENOUS
Refills: 0 | DISCHARGE

## 2023-07-25 RX ORDER — PANTOPRAZOLE SODIUM 20 MG/1
1 TABLET, DELAYED RELEASE ORAL
Refills: 0 | DISCHARGE

## 2023-07-25 RX ORDER — POLYETHYLENE GLYCOL 3350 17 G/17G
17 POWDER, FOR SOLUTION ORAL
Qty: 0 | Refills: 0 | DISCHARGE
Start: 2023-07-25

## 2023-07-25 RX ADMIN — SPIRONOLACTONE 25 MILLIGRAM(S): 25 TABLET, FILM COATED ORAL at 05:17

## 2023-07-25 RX ADMIN — Medication 20 MILLIGRAM(S): at 05:17

## 2023-07-25 RX ADMIN — LOSARTAN POTASSIUM 25 MILLIGRAM(S): 100 TABLET, FILM COATED ORAL at 05:17

## 2023-07-25 RX ADMIN — CHLORHEXIDINE GLUCONATE 1 APPLICATION(S): 213 SOLUTION TOPICAL at 05:14

## 2023-07-25 RX ADMIN — Medication 1: at 09:02

## 2023-07-25 RX ADMIN — POLYETHYLENE GLYCOL 3350 17 GRAM(S): 17 POWDER, FOR SOLUTION ORAL at 05:16

## 2023-07-25 RX ADMIN — APIXABAN 5 MILLIGRAM(S): 2.5 TABLET, FILM COATED ORAL at 05:17

## 2023-07-25 RX ADMIN — PANTOPRAZOLE SODIUM 40 MILLIGRAM(S): 20 TABLET, DELAYED RELEASE ORAL at 05:16

## 2023-07-25 NOTE — DIETITIAN INITIAL EVALUATION ADULT - ORAL INTAKE PTA/DIET HISTORY
Pt seen with her daughter at bedside who interpreted Jamaican and provided collateral. Pt with good appetite at home

## 2023-07-25 NOTE — PHARMACOTHERAPY INTERVENTION NOTE - COMMENTS
Discharge medications reviewed with patient's family. Outpatient medication schedule was discussed in detail including: medication name, indication, dose, administration times, treatment duration, side effects and special instructions. Also discussed heart failure diagnosis, fluid and salt restrictions, and maintaining a log of daily weights. She does not have a scale at home so one was provided to her. Also discussed warning signs of fluid overload (SOB, orthopnea, FAITH, edema, etc.). Patient verbalized understanding. Informed patient that medication list may change prior to discharge. Patient provided with educational booklet.    Mary Stone, PharmD, Clinical Pharmacy Specialist, [phone]

## 2023-07-25 NOTE — DIETITIAN INITIAL EVALUATION ADULT - PERTINENT MEDS FT
MEDICATIONS  (STANDING):  apixaban 5 milliGRAM(s) Oral every 12 hours  chlorhexidine 2% Cloths 1 Application(s) Topical <User Schedule>  dextrose 5%. 1000 milliLiter(s) (50 mL/Hr) IV Continuous <Continuous>  dextrose 5%. 1000 milliLiter(s) (100 mL/Hr) IV Continuous <Continuous>  dextrose 50% Injectable 25 Gram(s) IV Push once  dextrose 50% Injectable 12.5 Gram(s) IV Push once  dextrose 50% Injectable 25 Gram(s) IV Push once  furosemide    Tablet 20 milliGRAM(s) Oral daily  glucagon  Injectable 1 milliGRAM(s) IntraMuscular once  insulin lispro (ADMELOG) corrective regimen sliding scale   SubCutaneous three times a day before meals  insulin lispro (ADMELOG) corrective regimen sliding scale   SubCutaneous at bedtime  losartan 25 milliGRAM(s) Oral daily  pantoprazole    Tablet 40 milliGRAM(s) Oral before breakfast  polyethylene glycol 3350 17 Gram(s) Oral two times a day  spironolactone 25 milliGRAM(s) Oral daily    MEDICATIONS  (PRN):  acetaminophen     Tablet .. 650 milliGRAM(s) Oral every 6 hours PRN Temp greater or equal to 38C (100.4F), Mild Pain (1 - 3)  dextrose Oral Gel 15 Gram(s) Oral once PRN Blood Glucose LESS THAN 70 milliGRAM(s)/deciliter

## 2023-07-25 NOTE — DISCHARGE NOTE NURSING/CASE MANAGEMENT/SOCIAL WORK - NSDCPEFALRISK_GEN_ALL_CORE
For information on Fall & Injury Prevention, visit: [email or web address] OR  [email or web address] OR  https://www.cdc.gov/steadi/patient.html

## 2023-07-25 NOTE — DISCHARGE NOTE NURSING/CASE MANAGEMENT/SOCIAL WORK - NSDCFUADDAPPT_GEN_ALL_CORE_FT
Please follow up with your primary care doctor at [facility] in [place] on Monday July 31st at 2:45pm. [phone]. Dr. Paredes    Cardiology/Heart failure follow up in 1 week.  Please follow up with EP doctor 8/3/23 at 9:20AM

## 2023-07-25 NOTE — PROGRESS NOTE ADULT - REASON FOR ADMISSION
Transferred for BIV upgrade

## 2023-07-25 NOTE — DISCHARGE NOTE PROVIDER - HOSPITAL COURSE
73F w/ pmhx  Chronic Afib (eliquis), ICD(placed at [place] 5/2023), and HFrEF (10-15%) transferred from [facility] to [facility] CCU for BIV grade. Initially presented to [facility] with c/o SOB, found to have volume overload. Pt admitted for cardiogenic shock due to acute on chronic systolic heart failure. Pt admitted to their CCU for inotrope assisted diuresis. Lactic acidosis improved with inotrope assisted diuresis.  Echo showed EF 13%, Severe  mitral regurgitation,  Severe global left ventricular systolic dysfunction. Pt was transitioned to eliquis per cards recs given resolution of LV thrombus on repeat TTE>, pt has $30 copay and daughter is agreeable. Patient follow with [facility]. PT recs no PT needs.      On the day of discharge, pt denies CP, SOB or LE swelling. Daughter at bedside and all questions were answered, pt hemodynamically stable for discharge.

## 2023-07-25 NOTE — DIETITIAN INITIAL EVALUATION ADULT - LITERATURE/VIDEOS GIVEN
RD provided the patient with extensive verbal and written Persian DM diet education; including, carb counting, label reading, meal planning, pre-prandial and post-prandial finger stick goals, and HbA1c goal. Patient was also made aware of the physiological implications of poor glycemic control. Also discussed Heart Healthy diet recommendations. Importance of having consistent carbohydrate with protein at each meals emphasized.

## 2023-07-25 NOTE — DISCHARGE NOTE PROVIDER - NSDCCPCAREPLAN_GEN_ALL_CORE_FT
PRINCIPAL DISCHARGE DIAGNOSIS  Diagnosis: Cardiogenic shock  Assessment and Plan of Treatment: You had low heart function due to heart failure. You were treated with medicateion to help your heart pump better and you improved. COntinue follow up with cardiology upon discharge      SECONDARY DISCHARGE DIAGNOSES  Diagnosis: Acute on chronic systolic congestive heart failure  Assessment and Plan of Treatment: You were treated with diuretics to help improve your urine output. Continue with furosemide.    Diagnosis: Chronic atrial fibrillation  Assessment and Plan of Treatment: Please continue blood thinner eliquis and follow up with Cardiology and your primary doctor    Diagnosis: Tachyarrhythmia  Assessment and Plan of Treatment: You had your defribillator upgraded due to high risk of abnormal rhythm in light of heart failure. Follow up with EP doctor 8/3/23 as scheduled.     PRINCIPAL DISCHARGE DIAGNOSIS  Diagnosis: Cardiogenic shock  Assessment and Plan of Treatment: You had low heart function due to heart failure. You were treated with medication to help your heart pump better and you improved. Continue follow up with cardiology upon discharge.  Low salt intake. Restrict fluid intake based on your doctors recommendations. Monitor daily weights. If you note weight gain >2-3lbs in one week, follow up with your doctor or return to the hospital immediately. Follow up with your cardiologist as outpatient in 1 week at [facility]. Heart Failure Clinic at [facility] [phone].      SECONDARY DISCHARGE DIAGNOSES  Diagnosis: Acute on chronic systolic congestive heart failure  Assessment and Plan of Treatment: You were treated with diuretics to help improve your urine output. Continue with Bumex.  Low salt intake. Restrict fluid intake based on your doctors recommendations. Monitor daily weights. If you note weight gain >2-3lbs in one week, follow up with your doctor or return to the hospital immediately. Follow up with your cardiologist as outpatient in 1 week at [facility]. Heart Failure Clinic at [facility] [phone].  Please follow up with your primary care doctor at [facility] in [place] on Monday July 31st at 2:45pm. [phone].    Diagnosis: Chronic atrial fibrillation  Assessment and Plan of Treatment: Please continue blood thinner Eliquis and follow up with Cardiology and your primary doctor at [facility].    Diagnosis: Tachyarrhythmia  Assessment and Plan of Treatment: You had your defribillator upgraded due to high risk of abnormal rhythm in light of heart failure. Follow up with electrophysiologist doctor 8/3/23 as scheduled.

## 2023-07-25 NOTE — DISCHARGE NOTE PROVIDER - CARE PROVIDER_API CALL
Landen Galeano  Cardiac Electrophysiology  [address] [place] [address], [address]  [place] [address]  Phone: [phone]  Fax: [phone]  Follow Up Time:

## 2023-07-25 NOTE — DIETITIAN INITIAL EVALUATION ADULT - OTHER INFO
73F w/ PMH:  Afib (Eliquis started in [place]), ICD (placed at [place] 5/2023), NICM, HLD and HFrEF, initially presented to [facility] with volume overload, Admitted to their CCU for Primacor gtt and Lasix gtt. Echo at [facility] on 7/11 demonstrated EF 10-15%, severe MR, severe TR, +LV thrombus. Patient transferred to [facility] for BIV upgrade.  Pt seen with daughter and consumes 75% intake of meals with No GI distress (nausea/vomiting/diarrhea/constipation.) at present. Pt newly diagnosed with DM as per daughter. Labs reviewed. A1c- 6.9% (7/18/23)newly diagnosed DM. Rec change diet to CHO consistent, DASH/TLC, 1000ml fluid restriction. Provided diet education and Citizen of [place] handout to pt's daughter.  Per daughter, UBW- 135#, Current wt- 134# (7/25/23) Via bed scale. Noted skin ecchymosis, no edema per nursing flow sheet.

## 2023-07-25 NOTE — DISCHARGE NOTE PROVIDER - NSDCFUSCHEDAPPT_GEN_ALL_CORE_FT
[facility] Physician [facility] [phone]  Scheduled Appointment: 08/03/2023

## 2023-07-25 NOTE — PROGRESS NOTE ADULT - CRITICAL CARE ATTENDING COMMENT
73 year old woman with AFIB on Eliquis, ICD placed in May 2023 at [facility], HFrEF (NICM) who was initially admitted to [facility] with dyspnea and fluid overload. Placed in CCU at [facility] and started on Lasix gtt with addition of Milrinone in setting of acute on chronic systolic heart failure. She was transferred to CCU for possible BiVICD upgrade.  TTE at [facility] showed Severe LVD with LV thrombus, severe MR/severe TR  Milrinone stopped 7/23/23    TTE 7/19/23  CONCLUSIONS:  1. Calcified trileaflet aortic valve with normal opening.  Minimal aortic regurgitation.  2. Severe left ventricular enlargement.  3. Severe global left ventricular systolic dysfunction.  Severe global hypokinesis. LVEF calculated using biplane  Coffman's method is 13%. No left ventricular thrombus.  4. Unable to determine the severity of diastolic function  due to severe mitral regurgitation. Increased E/e'  is  consistent with elevated left ventricular filling pressure.  5. Normal right atrium.  6. Normal right ventricular size and function. A device  wire is noted in the right heart.  7. Normal tricuspid valve.   Moderate tricuspid  regurgitation.  8. No pericardial effusion seen.  *** No previous Echo exam.    Meds:  Losartan 25 mg daily  Aldactone 25 mg daily  Protonix  Insulin    #Neuro- Luxembourgish speaking, no active issues  #Pulm-Diuresed with lasix  Continue to monitor  #CV- Acute on chronic systolic heart failure  Continue losartan  Continue Aldactone  sp BiVICD  add eliquis  #Renal- Cr stable, continue to monitor  #ID- no active issues  #DVT ppx - SCD

## 2023-07-25 NOTE — DISCHARGE NOTE PROVIDER - NSDCMRMEDTOKEN_GEN_ALL_CORE_FT
ALPRAZolam 0.25 mg oral tablet: 1 orally once a day as needed for  insomnia  Eliquis 5 mg oral tablet: 1 tab(s) orally 2 times a day  furosemide 20 mg oral tablet: 1 tab(s) orally once a day  losartan 25 mg oral tablet: 1 tab(s) orally once a day  pantoprazole 20 mg oral delayed release tablet: 1 orally once a day  polyethylene glycol 3350 oral powder for reconstitution: 17 gram(s) orally once a day as needed for  constipation  spironolactone 25 mg oral tablet: 1 tab(s) orally once a day   ALPRAZolam 0.25 mg oral tablet: 1 orally once a day as needed for  insomnia  bumetanide 1 mg oral tablet: 1 tab(s) orally once a day  Eliquis 5 mg oral tablet: 1 tab(s) orally 2 times a day  losartan 25 mg oral tablet: 1 tab(s) orally once a day  polyethylene glycol 3350 oral powder for reconstitution: 17 gram(s) orally once a day as needed for  constipation  spironolactone 25 mg oral tablet: 1 tab(s) orally once a day

## 2023-07-25 NOTE — PROGRESS NOTE ADULT - PROVIDER SPECIALTY LIST ADULT
CCU
Electrophysiology
CCU
CCU
Cardiology

## 2023-07-25 NOTE — DISCHARGE NOTE NURSING/CASE MANAGEMENT/SOCIAL WORK - PATIENT PORTAL LINK FT
You can access the FollowMyHealth Patient Portal offered by [facility] by registering at the following website: [email or web address]. By joining [email or web address] FollowMyHealth portal, you will also be able to view your health information using other applications (apps) compatible with our system.

## 2023-07-25 NOTE — PROGRESS NOTE ADULT - NS ATTEND OPT1 GEN_ALL_CORE

## 2023-07-25 NOTE — DIETITIAN INITIAL EVALUATION ADULT - PERTINENT LABORATORY DATA
07-25    130<L>  |  97<L>  |  19  ----------------------------<  124<H>  4.4   |  24  |  0.63    Ca    9.6      25 Jul 2023 06:25  Phos  3.7     07-25  Mg     1.70     07-25    TPro  6.6  /  Alb  3.4  /  TBili  1.1  /  DBili  x   /  AST  23  /  ALT  19  /  AlkPhos  142<H>  07-24  POCT Blood Glucose.: 105 mg/dL (07-25-23 @ 12:20)  A1C with Estimated Average Glucose Result: 6.9 % (07-18-23 @ 22:10)

## 2023-07-25 NOTE — DISCHARGE NOTE PROVIDER - NSDCFUADDAPPT_GEN_ALL_CORE_FT
Please follow up with your primary care doctor   Please follow up with EP doctor 8/3/23 at 9:20AM Please follow up with your primary care doctor at [facility] in [place] on Monday July 31st at 2:45pm. [phone]. Dr. Paredes    Cardiology/Heart failure follow up in 1 week.  Please follow up with EP doctor 8/3/23 at 9:20AM Yes - the patient is able to be screened

## 2023-07-25 NOTE — PROGRESS NOTE ADULT - SUBJECTIVE AND OBJECTIVE BOX
Patient requesting daughter to translate.   Patient feeling much better. Ambulating with assistance.   Denies chest pain, shortness of breath, palpitations or lightheadedness.   No abdominal pain, nausea, chills or pain at the device site.   Transitioned to oral Lasix. Restarted on Eliquis.    Vital Signs Last 24 Hrs  T(C): 36.7 (25 Jul 2023 05:17), Max: 37 (24 Jul 2023 16:40)  T(F): 98.1 (25 Jul 2023 05:17), Max: 98.6 (24 Jul 2023 16:40)  HR: 77 (25 Jul 2023 05:17) (77 - 93)  BP: 97/54 (25 Jul 2023 05:17) (80/47 - 112/41)  BP(mean): 65 (25 Jul 2023 01:00) (54 - 69)  RR: 18 (25 Jul 2023 05:17) (17 - 26)  SpO2: 99% (25 Jul 2023 05:17) (98% - 100%)    Parameters below as of 25 Jul 2023 05:17  Patient On (Oxygen Delivery Method): room air    Telemetry Biventricular pacing.     MEDICATIONS  (STANDING):  apixaban 5 milliGRAM(s) Oral every 12 hours  chlorhexidine 2% Cloths 1 Application(s) Topical <User Schedule>  dextrose 5%. 1000 milliLiter(s) (50 mL/Hr) IV Continuous <Continuous>  dextrose 5%. 1000 milliLiter(s) (100 mL/Hr) IV Continuous <Continuous>  dextrose 50% Injectable 25 Gram(s) IV Push once  dextrose 50% Injectable 12.5 Gram(s) IV Push once  dextrose 50% Injectable 25 Gram(s) IV Push once  furosemide    Tablet 20 milliGRAM(s) Oral daily  glucagon  Injectable 1 milliGRAM(s) IntraMuscular once  insulin lispro (ADMELOG) corrective regimen sliding scale   SubCutaneous three times a day before meals  insulin lispro (ADMELOG) corrective regimen sliding scale   SubCutaneous at bedtime  losartan 25 milliGRAM(s) Oral daily  pantoprazole    Tablet 40 milliGRAM(s) Oral before breakfast  polyethylene glycol 3350 17 Gram(s) Oral two times a day  spironolactone 25 milliGRAM(s) Oral daily    MEDICATIONS  (PRN):  acetaminophen     Tablet .. 650 milliGRAM(s) Oral every 6 hours PRN Temp greater or equal to 38C (100.4F), Mild Pain (1 - 3)  dextrose Oral Gel 15 Gram(s) Oral once PRN Blood Glucose LESS THAN 70 milliGRAM(s)/deciliter          Physical exam:   Gen- well developed well nourished in NAD  Resp- clear to auscultation. no wheezing, rales or rhonchi  CV- S1 and S2 RRR. No murmurs, gallops or rubs. ICD site without swelling, ecchymosis or bleeding. Dermabond glue intact.   ABD- soft nontender + bowel sounds  EXT- no edema No calf tender ness.   Neuro- grossly nonfocal                            10.0   6.60  )-----------( 329      ( 25 Jul 2023 06:25 )             32.0     PT/INR - ( 24 Jul 2023 04:39 )   PT: 15.3 sec;   INR: 1.32 ratio           07-25    130<L>  |  97<L>  |  19  ----------------------------<  124<H>  4.4   |  24  |  0.63    Ca    9.6      25 Jul 2023 06:25  Phos  3.7     07-25  Mg     1.70     07-25    TPro  6.6  /  Alb  3.4  /  TBili  1.1  /  DBili  x   /  AST  23  /  ALT  19  /  AlkPhos  142<H>  07-24      < from: TTE with Doppler (w/Cont) (07.24.23 @ 11:37) >  Patient name: LARISSA HAHN  YOB: 1950   Age: 73 (F)   MR#: [id number]  Study Date: 7/24/2023  [facility]  Study quality: Technically good  Referring Physician: Sakina Saenz MD  BloodPressure: 96/48 mmHg  Height: 157 cm  Weight: 59 kg  BSA: 1.6 m2  ------------------------------------------------------------------------  PROCEDURE: Transthoracic echocardiogram with 2-D, M-Mode  and complete spectral and color flow Doppler.  Intravenous ultrasound enhancing agent was administered for  improved left ventricular endocardial border definition.  Following the intravenous injection of ultrasound enhancing  agent, harmonic imaging was performed.  INDICATION: Cardiogenic shock (R57.0)  ------------------------------------------------------------------------  DIMENSIONS:  Dimensions:     Normal Values:  LA:     5.2 cm    2.0 - 4.0 cm  Ao:     2.7 cm    2.0 - 3.8 cm  SEPTUM: 0.8 cm    0.6 - 1.2 cm  PWT:    0.8 cm    0.6 - 1.1 cm  LVIDd:  7.5 cm    3.0 - 5.6 cm  LVIDs:  7.0 cm    1.8 - 4.0 cm  Derived Variables:  LVMI: 174 g/m2  RWT: 0.21  Fractional short: 7 %  Ejection Fraction (Coffman Rule): 15 %  ------------------------------------------------------------------------  OBSERVATIONS:  Mitral Valve: Mitral annular calcification, otherwise  normal mitral valve. Moderate-severe mitral regurgitation.  Aortic Root: Normal aortic root.  Aortic Valve: Aortic valve leaflet morphology not well  visualized. Minimal aortic regurgitation.  Left Atrium: Severely dilated left atrium.  LA volume index  = 75 cc/m2.  Left Ventricle: Severe global left ventricular systolic  dysfunction.  Endocardial visualization enhanced with  intravenous injection of echo contrast (Definity).  No LV  thrombus seen. Severe left ventricular enlargement.  < from: TTE with Doppler (w/Cont) (07.24.23 @ 11:37) >  Indeterminate diastolic function.  Right Heart: Mild right atrial enlargement. Normal right  ventricular size and function.  A device wire is noted in  the right heart. Normal tricuspid valve.   Moderate  tricuspid regurgitation. Normal pulmonic valve.  Pericardium/PleuraNormal pericardium with no pericardial  effusion.  Hemodynamic: Estimated right ventricular systolic pressure  equals 52 mm Hg, assuming right atrial pressure equals 10  mm Hg, consistent with moderate pulmonary hypertension.  ------------------------------------------------------------------------  CONCLUSIONS:  1. Mitral annular calcification, otherwise normal mitral  valve. Moderate-severe mitral regurgitation.  2. Severely dilated left atrium.  LA volume index = 75  cc/m2.  3. Severe left ventricular enlargement.  4. Severe global left ventricular systolic dysfunction.  Endocardial visualization enhanced with intravenous  injection of echo contrast (Definity).  No LV thrombus  seen.  5. Normal right ventricular size and function.  A device  wire is noted in the right heart.  6. Estimated right ventricular systolic pressure equals 52  mm Hg, assuming right atrial pressure equals 10 mm Hg,  consistent with moderate pulmonary hypertension.  *** Compared with echocardiogram of 7/19/2023, no  significant changes noted.  ------------------------------------------------------------------------  Confirmed on  7/24/2023 - 14:20:35 by Jasen Bond M.D.,  [facility], [facility]  ------------------------------------------------------------------------

## 2023-07-25 NOTE — PROGRESS NOTE ADULT - ASSESSMENT
73F w/ PMHx:  Afib (Eliquis started in [place]), ICD (placed at [place] 5/2023), NICM, HLD and HFrEF, initially presented to [facility] with volume overload, Admitted to their CCU for Primacor gtt and Lasix gtt. Echo at [facility] on 7/11 demonstrated EF 10-15%, severe MR, severe TR, +LV thrombus. Patient transferred to [facility] for BIV upgrade.  - ECHO here showed EF 13%, elevated LV filling pressure. No LV thrombus.  - Patient underwent upgrade to a CRT-D on 7/21/23. Tolerated the procedure well. No complications. Now off milrinone and Lasix gtt. Transitioned to po Lasix.   -Eliquis for AFib restarted. No evidence of bleeding or hematoma at the device site.     Post procedure BiV-ICD teaching done with patient and her family.  Written instructions and contact information provided.   She was given the home monitor with written and verbal instructions.   She has a follow-up appointment in the device clinic on 8/3/2023 at 9:20am  4th floor Oncology Building [phone].   Anticipate discharge today.

## 2023-07-25 NOTE — PROGRESS NOTE ADULT - NS ATTEND AMEND GEN_ALL_CORE FT
S/p BiV placement. Doing well. Started on Eliquis. Ok for discharge from EP stand point.
73F w/ PMHx:  Afib (Eliquis started in [place]), ICD (placed at [place] 5/2023), NICM, HLD and HFrEF, initially presented to [facility] with volume overload, Now s/p BiV upgrade. Doing well. Can start Eliquis tomorrow.
73F w/ PMHx: P Afib (Eliquis) ICD Biotronik (placed at [place] 5/2023), NICM, HLD and chronic HFrEF transferred to [facility] CCU. She Initially presented to [facility] with acute decompensated HF. She was admitted to their CCU for milrinone gtt and Lasix gtt. s/p BiV ICD upgrade. Can wean off milrinone. Cont lasix. Will follow.
Milrinone stopped.  Trend lactate.  Losartan increased to 25 mg po qd.  Hold off on BB for now.  She needs f/u in cardiology clinic.  Coumadin started.
73F w/ PMHx: P Afib (Eliquis) ICD Biotronik (placed at [place] 5/2023), NICM, HLD and chronic HFrEF transferred to [facility] CCU. She Initially presented to [facility] with acute decompensated HF. She was admitted to their CCU for milrinone gtt and Lasix gtt. Patient was transferred with Lasix drip at 10mg/hr, milrinone drip 0.375mcg/kg/min and heparin drip 950units/hr infusing, to [facility] CCU for BIV upgrade as EKG revealed LBBB. - Echo at [facility] on 7/11 shows EF 10-15%, severe MR, LV thrombus, repeat TTE on 7/19 revealed LVEF 13%, severe MR and NO LV thrombus seen.  Patient will benefit from cardiac resynchronization therapy (BiV PPM) upgrade given her NYHA Class II HF which now progressed to class III and worsening LVEF and cardiac dyssynchrony SR with LBBB (QRS at 183ms).  Patient and her daughter are amenable to CRT-D procedure.
Fall Risk

## 2023-07-25 NOTE — PROGRESS NOTE ADULT - ASSESSMENT
73F w/ PMHx:  Afib (Eliquis) ICD (placed at [place] 5/2023), NICM, HLD and HFrEF transferred to [facility] CCU. Initially presented to [facility] with c/o SOB, found to have volume overload, admitted to their CCU for Primacor gtt and Lasix gtt. Patient transferred with Lasix drip at 10mg/hr, milrinone drip 0.375mcg/kg/min and heparin drip infusing at 950units/hr admitted to CCU for BIV upgrade.       Acute on chronic systolic heart failure   - Echo at [facility] on 7/11 shows EF 10-15%, severe MR, severe TR, +LV thrombus  - TTE this admission- EF 13%, no LV thrombus, elevated LV filling pressure  - s/p  milrinone drip titrate off on 7/23  - s/p diuresis with Lasix gtt, Lasix 60 mg IVP BID d/cd  - Not volume overloaded on exam and no JVD noted  - Continue Aldactone 25mg daily, losartan 25mg PO daily, Lasix 20mg daily  - Beta blocker is on hold.  Possibly can start as an out patient    - Please DO NOT resume midodrine   - s/p BIV upgraded 7/21  - Lactate 1.2 -> 2.4 -> 2.1--> 1.4 -->1.0   - Monitor I&Os and daily standing weights   - Poorly recorded I&Os   - Follow up cardiology clinic in two weeks     LV thrombus   - History of LV thrombus from [facility] admission  - TTE reported  at [facility] on  admission shows evidence of LV thrombus- started on coumadin   - Echo in [facility] x2 showed no LV thrombus  - Switched coumadin to Eliquis starting 7/25    Afib  - CHADS-VASc 3  - Eliquis on  7/25

## 2023-07-25 NOTE — DIETITIAN INITIAL EVALUATION ADULT - NS FNS DIET ORDER
Diet, DASH/TLC:   Sodium & Cholesterol Restricted  1000mL Fluid Restriction ([id number]) (07-18-23 @ 21:58)

## 2023-07-25 NOTE — DIETITIAN INITIAL EVALUATION ADULT - WEIGHT FOR BMI (LBS)
Physician Notification of Admission      To: Morgan Eckert M.D.    [address] [address]    From: Kirk Ho M.D.    Re: Anita Yahaira Reyes Manzo, 2020    Admitted on: 1/10/2020  1:12 PM    Admitting Diagnosis:    Bronchiolitis    Dear Morgan Eckert M.D.,      Our records indicate that we have admitted a patient to [facility] Pediatrics department who has listed you as their primary care provider, and we wanted to make sure you were aware of this admission. We strive to improve patient care by facilitating active communication with our medical colleagues from around the region.    To speak with a member of the patients care team, please contact the [facility] Pediatric department at [phone].   Thank you for allowing us to participate in the care of your patient.     
134

## 2023-07-25 NOTE — PROGRESS NOTE ADULT - SUBJECTIVE AND OBJECTIVE BOX
Matti Reed NP  CCU Service  Cardiology   Spect: [phone] ([facility])     PATIENT: LARISSA HAHN, MRN: [id number]    CHIEF COMPLAINT: Patient is a 73y old  Female who presents with a chief complaint of Transferred for BIV upgrade (24 Jul 2023 09:13)      INTERVAL HISTORY/OVERNIGHT EVENTS: No overnight events. Denies abdominal pain, chest pain or SOB, cough. Has been ambulating with assistance. Oriented to person, place, and time. Breathing comfortably on room air.    REVIEW OF SYSTEMS:    Constitutional:     [ ] negative [ ] fevers [ ] chills [ ] weight loss [ ] weight gain  HEENT:                  [ ] negative [ ] dry eyes [ ] eye irritation [ ] postnasal drip [ ] nasal congestion  CV:                         [ ] negative  [ ] chest pain [ ] orthopnea [ ] palpitations [ ] murmur  Resp:                     [ ] negative [ ] cough [ ] shortness of breath [ ] dyspnea [ ] wheezing [ ] sputum [ ] hemoptysis  GI:                          [ ] negative [ ] nausea [ ] vomiting [ ] diarrhea [ ] constipation [ ] abd pain [ ] dysphagia   :                        [ ] negative [ ] dysuria [ ] nocturia [ ] hematuria [ ] increased urinary frequency  Musculoskeletal: [ ] negative [ ] back pain [ ] myalgias [ ] arthralgias [ ] fracture  Skin:                       [ ] negative [ ] rash [ ] itch  Neurological:        [ ] negative [ ] headache [ ] dizziness [ ] syncope [ ] weakness [ ] numbness  Psychiatric:           [ ] negative [ ] anxiety [ ] depression  Endocrine:            [ ] negative [x ] diabetes [ ] thyroid problem  Heme/Lymph:      [ ] negative [ ] anemia [ ] bleeding problem  Allergic/Immune: [ ] negative [ ] itchy eyes [ ] nasal discharge [ ] hives [ ] angioedema    [x ] All other systems negative  [ ] Unable to assess ROS because ________.    MEDICATIONS:  MEDICATIONS  (STANDING):  apixaban 5 milliGRAM(s) Oral every 12 hours  furosemide    Tablet 20 milliGRAM(s) Oral daily  glucagon  Injectable 1 milliGRAM(s) IntraMuscular once  insulin lispro (ADMELOG) corrective regimen sliding scale   SubCutaneous three times a day before meals  insulin lispro (ADMELOG) corrective regimen sliding scale   SubCutaneous at bedtime  losartan 25 milliGRAM(s) Oral daily  pantoprazole    Tablet 40 milliGRAM(s) Oral before breakfast  polyethylene glycol 3350 17 Gram(s) Oral two times a day  spironolactone 25 milliGRAM(s) Oral daily    MEDICATIONS  (PRN):  acetaminophen     Tablet .. 650 milliGRAM(s) Oral every 6 hours PRN Temp greater or equal to 38C (100.4F), Mild Pain (1 - 3)  dextrose Oral Gel 15 Gram(s) Oral once PRN Blood Glucose LESS THAN 70 milliGRAM(s)/deciliter      ALLERGIES: Allergies    No Known Allergies    Intolerances        OBJECTIVE:  ICU Vital Signs Last 24 Hrs  T(C): 36.7 (25 Jul 2023 05:17), Max: 37 (24 Jul 2023 16:40)  T(F): 98.1 (25 Jul 2023 05:17), Max: 98.6 (24 Jul 2023 16:40)  HR: 77 (25 Jul 2023 05:17) (77 - 93)  BP: 97/54 (25 Jul 2023 05:17) (80/47 - 112/41)  BP(mean): 65 (25 Jul 2023 01:00) (54 - 69)  RR: 18 (25 Jul 2023 05:17) (17 - 26)  SpO2: 99% (25 Jul 2023 05:17) (97% - 100%)    O2 Parameters below as of 25 Jul 2023 05:17  Patient On (Oxygen Delivery Method): room air    CAPILLARY BLOOD GLUCOSE  129 (25 Jul 2023 05:17)      POCT Blood Glucose.: 199 mg/dL (25 Jul 2023 08:32)  POCT Blood Glucose.: 129 mg/dL (25 Jul 2023 06:35)  POCT Blood Glucose.: 141 mg/dL (25 Jul 2023 02:19)  POCT Blood Glucose.: 100 mg/dL (24 Jul 2023 21:30)  POCT Blood Glucose.: 125 mg/dL (24 Jul 2023 16:59)  POCT Blood Glucose.: 144 mg/dL (24 Jul 2023 11:40)    CAPILLARY BLOOD GLUCOSE  129 (25 Jul 2023 05:17)      POCT Blood Glucose.: 199 mg/dL (25 Jul 2023 08:32)    I&O's Summary    24 Jul 2023 07:01  -  25 Jul 2023 07:00  --------------------------------------------------------  IN: 945 mL / OUT: 1600 mL / NET: -655 mL      Daily     PHYSICAL EXAMINATION:  General: Comfortable, no acute distress, cooperative with exam.  HEENT: Moist mucous membranes.  Respiratory: CTAB, normal respiratory effort, no coughing, wheezes, crackles, or rales.  CV: RRR, S1S2, no murmurs, rubs or gallops. No JVD. Distal pulses intact.  Abdominal: Soft, nontender, nondistended, no rebound or guarding, normal bowel sounds.  Neurology: AOx3, no focal neuro defects, ANDRES x 4.  Extremities: No pitting edema, + Peripheral pulses. Warm   Cath site:   Tubes:    LABS:                          10.0   6.60  )-----------( 329      ( 25 Jul 2023 06:25 )             32.0     07-25    130<L>  |  97<L>  |  19  ----------------------------<  124<H>  4.4   |  24  |  0.63    Ca    9.6      25 Jul 2023 06:25  Phos  3.7     07-25  Mg     1.70     07-25    TPro  6.6  /  Alb  3.4  /  TBili  1.1  /  DBili  x   /  AST  23  /  ALT  19  /  AlkPhos  142<H>  07-24    LIVER FUNCTIONS - ( 24 Jul 2023 04:39 )  Alb: 3.4 g/dL / Pro: 6.6 g/dL / ALK PHOS: 142 U/L / ALT: 19 U/L / AST: 23 U/L / GGT: x           PT/INR - ( 24 Jul 2023 04:39 )   PT: 15.3 sec;   INR: 1.32 ratio                 Urinalysis Basic - ( 25 Jul 2023 06:25 )    Color: x / Appearance: x / SG: x / pH: x  Gluc: 124 mg/dL / Ketone: x  / Bili: x / Urobili: x   Blood: x / Protein: x / Nitrite: x   Leuk Esterase: x / RBC: x / WBC x   Sq Epi: x / Non Sq Epi: x / Bacteria: x        TELEMETRY: V pacing     EKG:     IMAGING:     TTE with Doppler (w/Cont) (07.24.23 @ 11:37)  DIMENSIONS:  Dimensions:     Normal Values:  LA:     5.2 cm    2.0 - 4.0 cm  Ao:     2.7 cm    2.0 - 3.8 cm  SEPTUM: 0.8 cm    0.6 - 1.2 cm  PWT:    0.8 cm    0.6 - 1.1 cm  LVIDd:  7.5 cm    3.0 - 5.6 cm  LVIDs:  7.0 cm    1.8 - 4.0 cm  Derived Variables:  LVMI: 174 g/m2  RWT: 0.21  Fractional short: 7 %  Ejection Fraction (Coffman Rule): 15 %  ------------------------------------------------------------------------  OBSERVATIONS:  Mitral Valve: Mitral annular calcification, otherwise  normal mitral valve. Moderate-severe mitral regurgitation.  Aortic Root: Normal aortic root.  Aortic Valve: Aortic valve leaflet morphology not well  visualized. Minimal aortic regurgitation.  Left Atrium: Severely dilated left atrium.  LA volume index  = 75 cc/m2.  Left Ventricle: Severe global left ventricular systolic  dysfunction.  Endocardial visualization enhanced with  intravenous injection of echo contrast (Definity).  No LV  thrombus seen. Severe left ventricular enlargement.  Indeterminate diastolic function.  Right Heart: Mild right atrial enlargement. Normal right  ventricular size and function.  A device wire is noted in  the right heart. Normal tricuspid valve.   Moderate  tricuspid regurgitation. Normal pulmonic valve.  Pericardium/PleuraNormal pericardium with no pericardial  effusion.  Hemodynamic: Estimated right ventricular systolic pressure  equals 52 mm Hg, assuming right atrial pressure equals 10  mm Hg, consistent with moderate pulmonary hypertension.  ------------------------------------------------------------------------  CONCLUSIONS:  1. Mitral annular calcification, otherwise normal mitral  valve. Moderate-severe mitral regurgitation.  2. Severely dilated left atrium.  LA volume index = 75  cc/m2.  3. Severe left ventricular enlargement.  4. Severe global left ventricular systolic dysfunction.  Endocardial visualization enhanced with intravenous  injection of echo contrast (Definity).  No LV thrombus  seen.  5. Normal right ventricular size and function.  A device  wire is noted in the right heart.  6. Estimated right ventricular systolic pressure equals 52  mm Hg, assuming right atrial pressure equals 10 mm Hg,  consistent with moderate pulmonary hypertension.     Patient aware.

## 2023-08-03 ENCOUNTER — APPOINTMENT (OUTPATIENT)
Dept: ELECTROPHYSIOLOGY | Facility: CLINIC | Age: 73
End: 2023-08-03
Payer: SELF-PAY

## 2023-08-03 DIAGNOSIS — I48.20 CHRONIC ATRIAL FIBRILLATION, UNSP: ICD-10-CM

## 2023-08-03 DIAGNOSIS — I44.7 LEFT BUNDLE-BRANCH BLOCK, UNSPECIFIED: ICD-10-CM

## 2023-08-03 DIAGNOSIS — E78.5 HYPERLIPIDEMIA, UNSPECIFIED: ICD-10-CM

## 2023-08-03 DIAGNOSIS — Z78.9 OTHER SPECIFIED HEALTH STATUS: ICD-10-CM

## 2023-08-03 PROCEDURE — 99024 POSTOP FOLLOW-UP VISIT: CPT

## 2023-08-03 RX ORDER — BUMETANIDE 1 MG/1
1 TABLET ORAL
Refills: 0 | Status: ACTIVE | COMMUNITY

## 2023-08-03 RX ORDER — LOSARTAN POTASSIUM 25 MG/1
25 TABLET, FILM COATED ORAL
Refills: 0 | Status: ACTIVE | COMMUNITY

## 2023-08-03 RX ORDER — APIXABAN 5 MG/1
5 TABLET, FILM COATED ORAL
Refills: 0 | Status: ACTIVE | COMMUNITY

## 2023-08-03 RX ORDER — MULTIVIT,IRON,MINERALS/LUTEIN
TABLET ORAL
Refills: 0 | Status: ACTIVE | COMMUNITY

## 2023-08-03 RX ORDER — SPIRONOLACTONE 25 MG/1
25 TABLET ORAL
Refills: 0 | Status: ACTIVE | COMMUNITY

## 2023-09-19 ENCOUNTER — APPOINTMENT (OUTPATIENT)
Dept: ELECTROPHYSIOLOGY | Facility: CLINIC | Age: 73
End: 2023-09-19
Payer: SELF-PAY

## 2023-09-19 ENCOUNTER — NON-APPOINTMENT (OUTPATIENT)
Age: 73
End: 2023-09-19

## 2023-09-19 VITALS — HEART RATE: 80 BPM | RESPIRATION RATE: 14 BRPM | SYSTOLIC BLOOD PRESSURE: 108 MMHG | DIASTOLIC BLOOD PRESSURE: 62 MMHG

## 2023-09-19 DIAGNOSIS — I50.22 CHRONIC SYSTOLIC (CONGESTIVE) HEART FAILURE: ICD-10-CM

## 2023-09-19 PROCEDURE — 93284 PRGRMG EVAL IMPLANTABLE DFB: CPT

## 2023-12-18 ENCOUNTER — APPOINTMENT (OUTPATIENT)
Dept: ELECTROPHYSIOLOGY | Facility: CLINIC | Age: 73
End: 2023-12-18
Payer: SELF-PAY

## 2023-12-18 ENCOUNTER — NON-APPOINTMENT (OUTPATIENT)
Age: 73
End: 2023-12-18

## 2023-12-18 PROCEDURE — 93296 REM INTERROG EVL PM/IDS: CPT

## 2023-12-18 PROCEDURE — 93295 DEV INTERROG REMOTE 1/2/MLT: CPT

## 2024-01-01 ENCOUNTER — INPATIENT (INPATIENT)
Facility: HOSPITAL | Age: 74
LOS: 0 days | Discharge: HOME | End: 2024-12-18
Attending: INTERNAL MEDICINE | Admitting: INTERNAL MEDICINE
Payer: MEDICAID

## 2024-01-01 VITALS
RESPIRATION RATE: 18 BRPM | DIASTOLIC BLOOD PRESSURE: 52 MMHG | SYSTOLIC BLOOD PRESSURE: 131 MMHG | OXYGEN SATURATION: 99 % | HEART RATE: 79 BPM | WEIGHT: 139.99 LBS | TEMPERATURE: 98 F

## 2024-01-01 VITALS
DIASTOLIC BLOOD PRESSURE: 55 MMHG | TEMPERATURE: 98 F | RESPIRATION RATE: 17 BRPM | OXYGEN SATURATION: 98 % | HEART RATE: 71 BPM | SYSTOLIC BLOOD PRESSURE: 100 MMHG

## 2024-01-01 DIAGNOSIS — R06.02 SHORTNESS OF BREATH: ICD-10-CM

## 2024-01-01 DIAGNOSIS — I48.20 CHRONIC ATRIAL FIBRILLATION, UNSPECIFIED: ICD-10-CM

## 2024-01-01 DIAGNOSIS — I10 ESSENTIAL (PRIMARY) HYPERTENSION: ICD-10-CM

## 2024-01-01 DIAGNOSIS — E78.5 HYPERLIPIDEMIA, UNSPECIFIED: ICD-10-CM

## 2024-01-01 DIAGNOSIS — E11.9 TYPE 2 DIABETES MELLITUS WITHOUT COMPLICATIONS: ICD-10-CM

## 2024-01-01 DIAGNOSIS — I50.9 HEART FAILURE, UNSPECIFIED: ICD-10-CM

## 2024-01-01 DIAGNOSIS — Z29.9 ENCOUNTER FOR PROPHYLACTIC MEASURES, UNSPECIFIED: ICD-10-CM

## 2024-01-01 LAB
A1C WITH ESTIMATED AVERAGE GLUCOSE RESULT: 5.6 % — SIGNIFICANT CHANGE UP (ref 4–5.6)
ADD ON TEST-SPECIMEN IN LAB: SIGNIFICANT CHANGE UP
ALBUMIN SERPL ELPH-MCNC: 3.8 G/DL — SIGNIFICANT CHANGE UP (ref 3.3–5)
ALBUMIN SERPL ELPH-MCNC: 4 G/DL — SIGNIFICANT CHANGE UP (ref 3.3–5)
ALP SERPL-CCNC: 124 U/L — HIGH (ref 40–120)
ALP SERPL-CCNC: 135 U/L — HIGH (ref 40–120)
ALT FLD-CCNC: 179 U/L — HIGH (ref 4–33)
ALT FLD-CCNC: 236 U/L — HIGH (ref 4–33)
ANION GAP SERPL CALC-SCNC: 14 MMOL/L — SIGNIFICANT CHANGE UP (ref 7–14)
ANION GAP SERPL CALC-SCNC: 15 MMOL/L — HIGH (ref 7–14)
APPEARANCE UR: CLEAR — SIGNIFICANT CHANGE UP
AST SERPL-CCNC: 176 U/L — HIGH (ref 4–32)
AST SERPL-CCNC: 97 U/L — HIGH (ref 4–32)
BASOPHILS # BLD AUTO: 0.05 K/UL — SIGNIFICANT CHANGE UP (ref 0–0.2)
BASOPHILS # BLD AUTO: 0.06 K/UL — SIGNIFICANT CHANGE UP (ref 0–0.2)
BASOPHILS NFR BLD AUTO: 0.8 % — SIGNIFICANT CHANGE UP (ref 0–2)
BASOPHILS NFR BLD AUTO: 0.9 % — SIGNIFICANT CHANGE UP (ref 0–2)
BILIRUB SERPL-MCNC: 0.9 MG/DL — SIGNIFICANT CHANGE UP (ref 0.2–1.2)
BILIRUB SERPL-MCNC: 1 MG/DL — SIGNIFICANT CHANGE UP (ref 0.2–1.2)
BILIRUB UR-MCNC: NEGATIVE — SIGNIFICANT CHANGE UP
BUN SERPL-MCNC: 13 MG/DL — SIGNIFICANT CHANGE UP (ref 7–23)
BUN SERPL-MCNC: 13 MG/DL — SIGNIFICANT CHANGE UP (ref 7–23)
CALCIUM SERPL-MCNC: 8.8 MG/DL — SIGNIFICANT CHANGE UP (ref 8.4–10.5)
CALCIUM SERPL-MCNC: 8.9 MG/DL — SIGNIFICANT CHANGE UP (ref 8.4–10.5)
CHLORIDE SERPL-SCNC: 90 MMOL/L — LOW (ref 98–107)
CHLORIDE SERPL-SCNC: 93 MMOL/L — LOW (ref 98–107)
CHOLEST SERPL-MCNC: 91 MG/DL — SIGNIFICANT CHANGE UP
CO2 SERPL-SCNC: 19 MMOL/L — LOW (ref 22–31)
CO2 SERPL-SCNC: 24 MMOL/L — SIGNIFICANT CHANGE UP (ref 22–31)
COLOR SPEC: YELLOW — SIGNIFICANT CHANGE UP
CREAT ?TM UR-MCNC: 8 MG/DL — SIGNIFICANT CHANGE UP
CREAT SERPL-MCNC: 0.55 MG/DL — SIGNIFICANT CHANGE UP (ref 0.5–1.3)
CREAT SERPL-MCNC: 0.57 MG/DL — SIGNIFICANT CHANGE UP (ref 0.5–1.3)
DIFF PNL FLD: NEGATIVE — SIGNIFICANT CHANGE UP
EGFR: 95 ML/MIN/1.73M2 — SIGNIFICANT CHANGE UP
EGFR: 96 ML/MIN/1.73M2 — SIGNIFICANT CHANGE UP
EOSINOPHIL # BLD AUTO: 0.2 K/UL — SIGNIFICANT CHANGE UP (ref 0–0.5)
EOSINOPHIL # BLD AUTO: 0.38 K/UL — SIGNIFICANT CHANGE UP (ref 0–0.5)
EOSINOPHIL NFR BLD AUTO: 2.6 % — SIGNIFICANT CHANGE UP (ref 0–6)
EOSINOPHIL NFR BLD AUTO: 6.7 % — HIGH (ref 0–6)
ESTIMATED AVERAGE GLUCOSE: 114 — SIGNIFICANT CHANGE UP
FLUAV AG NPH QL: SIGNIFICANT CHANGE UP
FLUBV AG NPH QL: SIGNIFICANT CHANGE UP
GAS PNL BLDV: SIGNIFICANT CHANGE UP
GLUCOSE BLDC GLUCOMTR-MCNC: 139 MG/DL — HIGH (ref 70–99)
GLUCOSE SERPL-MCNC: 114 MG/DL — HIGH (ref 70–99)
GLUCOSE SERPL-MCNC: 140 MG/DL — HIGH (ref 70–99)
GLUCOSE UR QL: 500 MG/DL
HCT VFR BLD CALC: 34.3 % — LOW (ref 34.5–45)
HCT VFR BLD CALC: 34.5 % — SIGNIFICANT CHANGE UP (ref 34.5–45)
HDLC SERPL-MCNC: 36 MG/DL — LOW
HGB BLD-MCNC: 11.2 G/DL — LOW (ref 11.5–15.5)
HGB BLD-MCNC: 11.7 G/DL — SIGNIFICANT CHANGE UP (ref 11.5–15.5)
IANC: 3.54 K/UL — SIGNIFICANT CHANGE UP (ref 1.8–7.4)
IANC: 5.62 K/UL — SIGNIFICANT CHANGE UP (ref 1.8–7.4)
IMM GRANULOCYTES NFR BLD AUTO: 0.3 % — SIGNIFICANT CHANGE UP (ref 0–0.9)
IMM GRANULOCYTES NFR BLD AUTO: 0.4 % — SIGNIFICANT CHANGE UP (ref 0–0.9)
KETONES UR-MCNC: NEGATIVE MG/DL — SIGNIFICANT CHANGE UP
LEUKOCYTE ESTERASE UR-ACNC: NEGATIVE — SIGNIFICANT CHANGE UP
LIPID PNL WITH DIRECT LDL SERPL: 40 MG/DL — SIGNIFICANT CHANGE UP
LYMPHOCYTES # BLD AUTO: 0.99 K/UL — LOW (ref 1–3.3)
LYMPHOCYTES # BLD AUTO: 1.13 K/UL — SIGNIFICANT CHANGE UP (ref 1–3.3)
LYMPHOCYTES # BLD AUTO: 12.9 % — LOW (ref 13–44)
LYMPHOCYTES # BLD AUTO: 20 % — SIGNIFICANT CHANGE UP (ref 13–44)
MCHC RBC-ENTMCNC: 31.2 PG — SIGNIFICANT CHANGE UP (ref 27–34)
MCHC RBC-ENTMCNC: 31.5 PG — SIGNIFICANT CHANGE UP (ref 27–34)
MCHC RBC-ENTMCNC: 32.7 G/DL — SIGNIFICANT CHANGE UP (ref 32–36)
MCHC RBC-ENTMCNC: 33.9 G/DL — SIGNIFICANT CHANGE UP (ref 32–36)
MCV RBC AUTO: 93 FL — SIGNIFICANT CHANGE UP (ref 80–100)
MCV RBC AUTO: 95.5 FL — SIGNIFICANT CHANGE UP (ref 80–100)
MONOCYTES # BLD AUTO: 0.54 K/UL — SIGNIFICANT CHANGE UP (ref 0–0.9)
MONOCYTES # BLD AUTO: 0.77 K/UL — SIGNIFICANT CHANGE UP (ref 0–0.9)
MONOCYTES NFR BLD AUTO: 10.1 % — SIGNIFICANT CHANGE UP (ref 2–14)
MONOCYTES NFR BLD AUTO: 9.5 % — SIGNIFICANT CHANGE UP (ref 2–14)
NEUTROPHILS # BLD AUTO: 3.54 K/UL — SIGNIFICANT CHANGE UP (ref 1.8–7.4)
NEUTROPHILS # BLD AUTO: 5.62 K/UL — SIGNIFICANT CHANGE UP (ref 1.8–7.4)
NEUTROPHILS NFR BLD AUTO: 62.5 % — SIGNIFICANT CHANGE UP (ref 43–77)
NEUTROPHILS NFR BLD AUTO: 73.3 % — SIGNIFICANT CHANGE UP (ref 43–77)
NITRITE UR-MCNC: NEGATIVE — SIGNIFICANT CHANGE UP
NON HDL CHOLESTEROL: 55 MG/DL — SIGNIFICANT CHANGE UP
NRBC # BLD: 0 /100 WBCS — SIGNIFICANT CHANGE UP (ref 0–0)
NRBC # BLD: 0 /100 WBCS — SIGNIFICANT CHANGE UP (ref 0–0)
NRBC # FLD: 0 K/UL — SIGNIFICANT CHANGE UP (ref 0–0)
NRBC # FLD: 0 K/UL — SIGNIFICANT CHANGE UP (ref 0–0)
NT-PROBNP SERPL-SCNC: 8492 PG/ML — HIGH
OSMOLALITY UR: 232 MOSM/KG — SIGNIFICANT CHANGE UP (ref 50–1200)
PH UR: 6.5 — SIGNIFICANT CHANGE UP (ref 5–8)
PLATELET # BLD AUTO: 261 K/UL — SIGNIFICANT CHANGE UP (ref 150–400)
PLATELET # BLD AUTO: 263 K/UL — SIGNIFICANT CHANGE UP (ref 150–400)
POTASSIUM SERPL-MCNC: 3.6 MMOL/L — SIGNIFICANT CHANGE UP (ref 3.5–5.3)
POTASSIUM SERPL-MCNC: 4 MMOL/L — SIGNIFICANT CHANGE UP (ref 3.5–5.3)
POTASSIUM SERPL-SCNC: 3.6 MMOL/L — SIGNIFICANT CHANGE UP (ref 3.5–5.3)
POTASSIUM SERPL-SCNC: 4 MMOL/L — SIGNIFICANT CHANGE UP (ref 3.5–5.3)
POTASSIUM UR-SCNC: 12.1 MMOL/L — SIGNIFICANT CHANGE UP
PROT ?TM UR-MCNC: 8 MG/DL — SIGNIFICANT CHANGE UP
PROT SERPL-MCNC: 6.7 G/DL — SIGNIFICANT CHANGE UP (ref 6–8.3)
PROT SERPL-MCNC: 6.9 G/DL — SIGNIFICANT CHANGE UP (ref 6–8.3)
PROT UR-MCNC: NEGATIVE MG/DL — SIGNIFICANT CHANGE UP
PROT/CREAT UR-RTO: 1 RATIO — HIGH (ref 0–0.2)
RBC # BLD: 3.59 M/UL — LOW (ref 3.8–5.2)
RBC # BLD: 3.71 M/UL — LOW (ref 3.8–5.2)
RBC # FLD: 14.8 % — HIGH (ref 10.3–14.5)
RBC # FLD: 15.2 % — HIGH (ref 10.3–14.5)
RSV RNA NPH QL NAA+NON-PROBE: SIGNIFICANT CHANGE UP
SARS-COV-2 RNA SPEC QL NAA+PROBE: SIGNIFICANT CHANGE UP
SODIUM SERPL-SCNC: 124 MMOL/L — LOW (ref 135–145)
SODIUM SERPL-SCNC: 131 MMOL/L — LOW (ref 135–145)
SODIUM UR-SCNC: 56 MMOL/L — SIGNIFICANT CHANGE UP
SP GR SPEC: 1.02 — SIGNIFICANT CHANGE UP (ref 1–1.03)
TRIGL SERPL-MCNC: 69 MG/DL — SIGNIFICANT CHANGE UP
TROPONIN T, HIGH SENSITIVITY RESULT: 16 NG/L — SIGNIFICANT CHANGE UP
UROBILINOGEN FLD QL: 0.2 MG/DL — SIGNIFICANT CHANGE UP (ref 0.2–1)
UUN UR-MCNC: 130 MG/DL — SIGNIFICANT CHANGE UP
WBC # BLD: 5.66 K/UL — SIGNIFICANT CHANGE UP (ref 3.8–10.5)
WBC # BLD: 7.66 K/UL — SIGNIFICANT CHANGE UP (ref 3.8–10.5)
WBC # FLD AUTO: 5.66 K/UL — SIGNIFICANT CHANGE UP (ref 3.8–10.5)
WBC # FLD AUTO: 7.66 K/UL — SIGNIFICANT CHANGE UP (ref 3.8–10.5)

## 2024-01-01 PROCEDURE — 71046 X-RAY EXAM CHEST 2 VIEWS: CPT | Mod: 26

## 2024-01-01 PROCEDURE — 74177 CT ABD & PELVIS W/CONTRAST: CPT | Mod: 26,MC

## 2024-01-01 PROCEDURE — 76705 ECHO EXAM OF ABDOMEN: CPT | Mod: 26

## 2024-01-01 PROCEDURE — 99285 EMERGENCY DEPT VISIT HI MDM: CPT

## 2024-01-01 PROCEDURE — 93306 TTE W/DOPPLER COMPLETE: CPT | Mod: 26

## 2024-01-01 PROCEDURE — 99223 1ST HOSP IP/OBS HIGH 75: CPT

## 2024-01-01 PROCEDURE — 99239 HOSP IP/OBS DSCHRG MGMT >30: CPT

## 2024-01-01 PROCEDURE — 93308 TTE F-UP OR LMTD: CPT | Mod: 26

## 2024-01-01 PROCEDURE — 71260 CT THORAX DX C+: CPT | Mod: 26,MC

## 2024-01-01 PROCEDURE — 99221 1ST HOSP IP/OBS SF/LOW 40: CPT

## 2024-01-01 RX ORDER — FUROSEMIDE 40 MG/1
40 TABLET ORAL DAILY
Refills: 0 | Status: DISCONTINUED | OUTPATIENT
Start: 2024-01-01 | End: 2024-01-01

## 2024-01-01 RX ORDER — ACETAMINOPHEN, DIPHENHYDRAMINE HCL, PHENYLEPHRINE HCL 325; 25; 5 MG/1; MG/1; MG/1
3 TABLET ORAL AT BEDTIME
Refills: 0 | Status: DISCONTINUED | OUTPATIENT
Start: 2024-01-01 | End: 2024-01-01

## 2024-01-01 RX ORDER — LOSARTAN POTASSIUM 100 MG/1
25 TABLET, FILM COATED ORAL DAILY
Refills: 0 | Status: DISCONTINUED | OUTPATIENT
Start: 2024-01-01 | End: 2024-01-01

## 2024-01-01 RX ORDER — 0.9 % SODIUM CHLORIDE 0.9 %
1000 INTRAVENOUS SOLUTION INTRAVENOUS
Refills: 0 | Status: DISCONTINUED | OUTPATIENT
Start: 2024-01-01 | End: 2024-01-01

## 2024-01-01 RX ORDER — GLUCAGON INJECTION, SOLUTION 0.5 MG/.1ML
1 INJECTION, SOLUTION SUBCUTANEOUS ONCE
Refills: 0 | Status: DISCONTINUED | OUTPATIENT
Start: 2024-01-01 | End: 2024-01-01

## 2024-01-01 RX ORDER — METOPROLOL TARTRATE 100 MG/1
25 TABLET, FILM COATED ORAL DAILY
Refills: 0 | Status: DISCONTINUED | OUTPATIENT
Start: 2024-01-01 | End: 2024-01-01

## 2024-01-01 RX ORDER — POTASSIUM CHLORIDE 600 MG/1
40 TABLET, EXTENDED RELEASE ORAL ONCE
Refills: 0 | Status: COMPLETED | OUTPATIENT
Start: 2024-01-01 | End: 2024-01-01

## 2024-01-01 RX ORDER — FLUTICASONE FUROATE, UMECLIDINIUM BROMIDE AND VILANTEROL TRIFENATATE 100; 62.5; 25 UG/1; UG/1; UG/1
1 POWDER RESPIRATORY (INHALATION)
Refills: 0 | DISCHARGE

## 2024-01-01 RX ORDER — FLUTICASONE PROPIONATE AND SALMETEROL XINAFOATE 45; 21 UG/1; UG/1
1 AEROSOL, METERED RESPIRATORY (INHALATION)
Refills: 0 | Status: DISCONTINUED | OUTPATIENT
Start: 2024-01-01 | End: 2024-01-01

## 2024-01-01 RX ORDER — ACETAMINOPHEN 500MG 500 MG/1
650 TABLET, COATED ORAL EVERY 6 HOURS
Refills: 0 | Status: DISCONTINUED | OUTPATIENT
Start: 2024-01-01 | End: 2024-01-01

## 2024-01-01 RX ORDER — FUROSEMIDE 40 MG/1
0 TABLET ORAL
Qty: 0 | Refills: 0 | DISCHARGE

## 2024-01-01 RX ORDER — GUAIFENESIN 400 MG
100 TABLET ORAL THREE TIMES A DAY
Refills: 0 | Status: DISCONTINUED | OUTPATIENT
Start: 2024-01-01 | End: 2024-01-01

## 2024-01-01 RX ORDER — APIXABAN 2.5 MG/1
5 TABLET, FILM COATED ORAL EVERY 12 HOURS
Refills: 0 | Status: DISCONTINUED | OUTPATIENT
Start: 2024-01-01 | End: 2024-01-01

## 2024-01-01 RX ORDER — SPIRONOLACTONE 25 MG
25 TABLET ORAL DAILY
Refills: 0 | Status: DISCONTINUED | OUTPATIENT
Start: 2024-01-01 | End: 2024-01-01

## 2024-01-01 RX ORDER — FUROSEMIDE 40 MG/1
1 TABLET ORAL
Qty: 5 | Refills: 0
Start: 2024-01-01 | End: 2024-01-01

## 2024-01-01 RX ORDER — FUROSEMIDE 40 MG/1
20 TABLET ORAL ONCE
Refills: 0 | Status: COMPLETED | OUTPATIENT
Start: 2024-01-01 | End: 2024-01-01

## 2024-01-01 RX ORDER — MAGNESIUM, ALUMINUM HYDROXIDE 200-225/5
30 SUSPENSION, ORAL (FINAL DOSE FORM) ORAL EVERY 4 HOURS
Refills: 0 | Status: DISCONTINUED | OUTPATIENT
Start: 2024-01-01 | End: 2024-01-01

## 2024-01-01 RX ADMIN — APIXABAN 5 MILLIGRAM(S): 2.5 TABLET, FILM COATED ORAL at 18:02

## 2024-01-01 RX ADMIN — FLUTICASONE PROPIONATE AND SALMETEROL XINAFOATE 1 DOSE(S): 45; 21 AEROSOL, METERED RESPIRATORY (INHALATION) at 08:42

## 2024-01-01 RX ADMIN — Medication 25 MILLIGRAM(S): at 08:41

## 2024-01-01 RX ADMIN — FUROSEMIDE 40 MILLIGRAM(S): 40 TABLET ORAL at 11:21

## 2024-01-01 RX ADMIN — Medication 2 PUFF(S): at 11:23

## 2024-01-01 RX ADMIN — FUROSEMIDE 20 MILLIGRAM(S): 40 TABLET ORAL at 10:37

## 2024-01-01 RX ADMIN — Medication 400 MILLIGRAM(S): at 11:21

## 2024-01-01 RX ADMIN — METOPROLOL TARTRATE 25 MILLIGRAM(S): 100 TABLET, FILM COATED ORAL at 08:41

## 2024-01-01 RX ADMIN — APIXABAN 5 MILLIGRAM(S): 2.5 TABLET, FILM COATED ORAL at 06:02

## 2024-01-01 RX ADMIN — Medication 100 MILLIGRAM(S): at 22:26

## 2024-01-01 RX ADMIN — FLUTICASONE PROPIONATE AND SALMETEROL XINAFOATE 1 DOSE(S): 45; 21 AEROSOL, METERED RESPIRATORY (INHALATION) at 22:45

## 2024-01-01 RX ADMIN — FUROSEMIDE 40 MILLIGRAM(S): 40 TABLET ORAL at 18:01

## 2024-01-01 RX ADMIN — Medication 30 MILLILITER(S): at 10:37

## 2024-01-01 RX ADMIN — POTASSIUM CHLORIDE 40 MILLIEQUIVALENT(S): 600 TABLET, EXTENDED RELEASE ORAL at 08:41

## 2024-01-01 RX ADMIN — LOSARTAN POTASSIUM 25 MILLIGRAM(S): 100 TABLET, FILM COATED ORAL at 08:41

## 2024-01-17 ENCOUNTER — APPOINTMENT (OUTPATIENT)
Dept: ELECTROPHYSIOLOGY | Facility: CLINIC | Age: 74
End: 2024-01-17

## 2024-03-18 ENCOUNTER — APPOINTMENT (OUTPATIENT)
Dept: ELECTROPHYSIOLOGY | Facility: CLINIC | Age: 74
End: 2024-03-18
Payer: SELF-PAY

## 2024-03-18 ENCOUNTER — NON-APPOINTMENT (OUTPATIENT)
Age: 74
End: 2024-03-18

## 2024-03-18 PROCEDURE — 93296 REM INTERROG EVL PM/IDS: CPT

## 2024-03-18 PROCEDURE — 93295 DEV INTERROG REMOTE 1/2/MLT: CPT

## 2024-04-10 ENCOUNTER — INPATIENT (INPATIENT)
Facility: HOSPITAL | Age: 74
LOS: 2 days | Discharge: HOME | End: 2024-04-13
Attending: INTERNAL MEDICINE | Admitting: INTERNAL MEDICINE
Payer: MEDICAID

## 2024-04-10 VITALS
SYSTOLIC BLOOD PRESSURE: 119 MMHG | HEART RATE: 80 BPM | RESPIRATION RATE: 17 BRPM | OXYGEN SATURATION: 95 % | TEMPERATURE: 98 F | DIASTOLIC BLOOD PRESSURE: 68 MMHG

## 2024-04-10 DIAGNOSIS — D72.10 EOSINOPHILIA, UNSPECIFIED: ICD-10-CM

## 2024-04-10 DIAGNOSIS — J45.901 UNSPECIFIED ASTHMA WITH (ACUTE) EXACERBATION: ICD-10-CM

## 2024-04-10 DIAGNOSIS — Z29.9 ENCOUNTER FOR PROPHYLACTIC MEASURES, UNSPECIFIED: ICD-10-CM

## 2024-04-10 DIAGNOSIS — I50.22 CHRONIC SYSTOLIC (CONGESTIVE) HEART FAILURE: ICD-10-CM

## 2024-04-10 DIAGNOSIS — D68.69 OTHER THROMBOPHILIA: ICD-10-CM

## 2024-04-10 DIAGNOSIS — R06.02 SHORTNESS OF BREATH: ICD-10-CM

## 2024-04-10 LAB
ALBUMIN SERPL ELPH-MCNC: 3.9 G/DL — SIGNIFICANT CHANGE UP (ref 3.3–5)
ALP SERPL-CCNC: 67 U/L — SIGNIFICANT CHANGE UP (ref 40–120)
ALT FLD-CCNC: 20 U/L — SIGNIFICANT CHANGE UP (ref 4–33)
ANION GAP SERPL CALC-SCNC: 14 MMOL/L — SIGNIFICANT CHANGE UP (ref 7–14)
APTT BLD: 32.9 SEC — SIGNIFICANT CHANGE UP (ref 24.5–35.6)
AST SERPL-CCNC: 26 U/L — SIGNIFICANT CHANGE UP (ref 4–32)
BASE EXCESS BLDV CALC-SCNC: -1.9 MMOL/L — SIGNIFICANT CHANGE UP (ref -2–3)
BASOPHILS # BLD AUTO: 0.08 K/UL — SIGNIFICANT CHANGE UP (ref 0–0.2)
BASOPHILS NFR BLD AUTO: 0.8 % — SIGNIFICANT CHANGE UP (ref 0–2)
BILIRUB SERPL-MCNC: 0.5 MG/DL — SIGNIFICANT CHANGE UP (ref 0.2–1.2)
BLOOD GAS VENOUS COMPREHENSIVE RESULT: SIGNIFICANT CHANGE UP
BLOOD GAS VENOUS COMPREHENSIVE RESULT: SIGNIFICANT CHANGE UP
BUN SERPL-MCNC: 16 MG/DL — SIGNIFICANT CHANGE UP (ref 7–23)
CA-I SERPL-SCNC: 1.21 MMOL/L — SIGNIFICANT CHANGE UP (ref 1.15–1.33)
CALCIUM SERPL-MCNC: 9.1 MG/DL — SIGNIFICANT CHANGE UP (ref 8.4–10.5)
CHLORIDE BLDV-SCNC: 98 MMOL/L — SIGNIFICANT CHANGE UP (ref 96–108)
CHLORIDE SERPL-SCNC: 96 MMOL/L — LOW (ref 98–107)
CO2 BLDV-SCNC: 26.7 MMOL/L — HIGH (ref 22–26)
CO2 SERPL-SCNC: 23 MMOL/L — SIGNIFICANT CHANGE UP (ref 22–31)
CREAT SERPL-MCNC: 0.63 MG/DL — SIGNIFICANT CHANGE UP (ref 0.5–1.3)
EGFR: 92 ML/MIN/1.73M2 — SIGNIFICANT CHANGE UP
EOSINOPHIL # BLD AUTO: 1.54 K/UL — HIGH (ref 0–0.5)
EOSINOPHIL NFR BLD AUTO: 15.2 % — HIGH (ref 0–6)
FLUAV AG NPH QL: SIGNIFICANT CHANGE UP
FLUBV AG NPH QL: SIGNIFICANT CHANGE UP
GAS PNL BLDV: 130 MMOL/L — LOW (ref 136–145)
GAS PNL BLDV: SIGNIFICANT CHANGE UP
GLUCOSE BLDV-MCNC: 152 MG/DL — HIGH (ref 70–99)
GLUCOSE SERPL-MCNC: 136 MG/DL — HIGH (ref 70–99)
HCO3 BLDV-SCNC: 25 MMOL/L — SIGNIFICANT CHANGE UP (ref 22–29)
HCT VFR BLD CALC: 37.5 % — SIGNIFICANT CHANGE UP (ref 34.5–45)
HCT VFR BLDA CALC: 39 % — SIGNIFICANT CHANGE UP (ref 34.5–46.5)
HGB BLD CALC-MCNC: 13 G/DL — SIGNIFICANT CHANGE UP (ref 11.7–16.1)
HGB BLD-MCNC: 12 G/DL — SIGNIFICANT CHANGE UP (ref 11.5–15.5)
IANC: 6.95 K/UL — SIGNIFICANT CHANGE UP (ref 1.8–7.4)
IMM GRANULOCYTES NFR BLD AUTO: 0.4 % — SIGNIFICANT CHANGE UP (ref 0–0.9)
INR BLD: 1.43 RATIO — HIGH (ref 0.85–1.18)
LACTATE BLDV-MCNC: 2.8 MMOL/L — HIGH (ref 0.5–2)
LYMPHOCYTES # BLD AUTO: 0.82 K/UL — LOW (ref 1–3.3)
LYMPHOCYTES # BLD AUTO: 8.1 % — LOW (ref 13–44)
MAGNESIUM SERPL-MCNC: 2.1 MG/DL — SIGNIFICANT CHANGE UP (ref 1.6–2.6)
MCHC RBC-ENTMCNC: 30.5 PG — SIGNIFICANT CHANGE UP (ref 27–34)
MCHC RBC-ENTMCNC: 32 GM/DL — SIGNIFICANT CHANGE UP (ref 32–36)
MCV RBC AUTO: 95.2 FL — SIGNIFICANT CHANGE UP (ref 80–100)
MONOCYTES # BLD AUTO: 0.71 K/UL — SIGNIFICANT CHANGE UP (ref 0–0.9)
MONOCYTES NFR BLD AUTO: 7 % — SIGNIFICANT CHANGE UP (ref 2–14)
NEUTROPHILS # BLD AUTO: 6.95 K/UL — SIGNIFICANT CHANGE UP (ref 1.8–7.4)
NEUTROPHILS NFR BLD AUTO: 68.5 % — SIGNIFICANT CHANGE UP (ref 43–77)
NRBC # BLD: 0 /100 WBCS — SIGNIFICANT CHANGE UP (ref 0–0)
NRBC # FLD: 0 K/UL — SIGNIFICANT CHANGE UP (ref 0–0)
NT-PROBNP SERPL-SCNC: 5213 PG/ML — HIGH
PCO2 BLDV: 51 MMHG — SIGNIFICANT CHANGE UP (ref 39–52)
PH BLDV: 7.3 — LOW (ref 7.32–7.43)
PHOSPHATE SERPL-MCNC: 3.6 MG/DL — SIGNIFICANT CHANGE UP (ref 2.5–4.5)
PLATELET # BLD AUTO: 208 K/UL — SIGNIFICANT CHANGE UP (ref 150–400)
PO2 BLDV: 52 MMHG — HIGH (ref 25–45)
POTASSIUM BLDV-SCNC: 3.8 MMOL/L — SIGNIFICANT CHANGE UP (ref 3.5–5.1)
POTASSIUM SERPL-MCNC: 3.6 MMOL/L — SIGNIFICANT CHANGE UP (ref 3.5–5.3)
POTASSIUM SERPL-SCNC: 3.6 MMOL/L — SIGNIFICANT CHANGE UP (ref 3.5–5.3)
PROT SERPL-MCNC: 6.6 G/DL — SIGNIFICANT CHANGE UP (ref 6–8.3)
PROTHROM AB SERPL-ACNC: 16 SEC — HIGH (ref 9.5–13)
RBC # BLD: 3.94 M/UL — SIGNIFICANT CHANGE UP (ref 3.8–5.2)
RBC # FLD: 13.7 % — SIGNIFICANT CHANGE UP (ref 10.3–14.5)
RSV RNA NPH QL NAA+NON-PROBE: SIGNIFICANT CHANGE UP
SAO2 % BLDV: 81.4 % — SIGNIFICANT CHANGE UP (ref 67–88)
SARS-COV-2 RNA SPEC QL NAA+PROBE: SIGNIFICANT CHANGE UP
SODIUM SERPL-SCNC: 133 MMOL/L — LOW (ref 135–145)
TROPONIN T, HIGH SENSITIVITY RESULT: 23 NG/L — SIGNIFICANT CHANGE UP
TROPONIN T, HIGH SENSITIVITY RESULT: 24 NG/L — SIGNIFICANT CHANGE UP
WBC # BLD: 10.14 K/UL — SIGNIFICANT CHANGE UP (ref 3.8–10.5)
WBC # FLD AUTO: 10.14 K/UL — SIGNIFICANT CHANGE UP (ref 3.8–10.5)

## 2024-04-10 PROCEDURE — 99291 CRITICAL CARE FIRST HOUR: CPT

## 2024-04-10 PROCEDURE — 71046 X-RAY EXAM CHEST 2 VIEWS: CPT | Mod: 26

## 2024-04-10 PROCEDURE — 99223 1ST HOSP IP/OBS HIGH 75: CPT

## 2024-04-10 PROCEDURE — 99285 EMERGENCY DEPT VISIT HI MDM: CPT

## 2024-04-10 RX ORDER — APIXABAN 2.5 MG/1
5 TABLET, FILM COATED ORAL EVERY 12 HOURS
Refills: 0 | Status: DISCONTINUED | OUTPATIENT
Start: 2024-04-10 | End: 2024-04-13

## 2024-04-10 RX ORDER — ONDANSETRON 8 MG/1
4 TABLET, FILM COATED ORAL EVERY 8 HOURS
Refills: 0 | Status: DISCONTINUED | OUTPATIENT
Start: 2024-04-10 | End: 2024-04-13

## 2024-04-10 RX ORDER — DAPAGLIFLOZIN 10 MG/1
10 TABLET, FILM COATED ORAL EVERY 24 HOURS
Refills: 0 | Status: DISCONTINUED | OUTPATIENT
Start: 2024-04-10 | End: 2024-04-13

## 2024-04-10 RX ORDER — FUROSEMIDE 40 MG
40 TABLET ORAL ONCE
Refills: 0 | Status: DISCONTINUED | OUTPATIENT
Start: 2024-04-10 | End: 2024-04-10

## 2024-04-10 RX ORDER — LOSARTAN POTASSIUM 100 MG/1
25 TABLET, FILM COATED ORAL DAILY
Refills: 0 | Status: DISCONTINUED | OUTPATIENT
Start: 2024-04-10 | End: 2024-04-13

## 2024-04-10 RX ORDER — METOPROLOL TARTRATE 50 MG
25 TABLET ORAL DAILY
Refills: 0 | Status: DISCONTINUED | OUTPATIENT
Start: 2024-04-10 | End: 2024-04-13

## 2024-04-10 RX ORDER — METOPROLOL TARTRATE 50 MG
1 TABLET ORAL
Refills: 0 | DISCHARGE

## 2024-04-10 RX ORDER — SPIRONOLACTONE 25 MG/1
25 TABLET, FILM COATED ORAL DAILY
Refills: 0 | Status: DISCONTINUED | OUTPATIENT
Start: 2024-04-10 | End: 2024-04-13

## 2024-04-10 RX ORDER — ALBUTEROL 90 UG/1
2 AEROSOL, METERED ORAL DAILY
Refills: 0 | Status: DISCONTINUED | OUTPATIENT
Start: 2024-04-10 | End: 2024-04-12

## 2024-04-10 RX ORDER — INFLUENZA VIRUS VACCINE 15; 15; 15; 15 UG/.5ML; UG/.5ML; UG/.5ML; UG/.5ML
0.7 SUSPENSION INTRAMUSCULAR ONCE
Refills: 0 | Status: DISCONTINUED | OUTPATIENT
Start: 2024-04-10 | End: 2024-04-13

## 2024-04-10 RX ORDER — LANOLIN ALCOHOL/MO/W.PET/CERES
3 CREAM (GRAM) TOPICAL AT BEDTIME
Refills: 0 | Status: DISCONTINUED | OUTPATIENT
Start: 2024-04-10 | End: 2024-04-12

## 2024-04-10 RX ORDER — ALPRAZOLAM 0.25 MG
1 TABLET ORAL
Refills: 0 | DISCHARGE

## 2024-04-10 RX ORDER — DAPAGLIFLOZIN 10 MG/1
1 TABLET, FILM COATED ORAL
Refills: 0 | DISCHARGE

## 2024-04-10 RX ORDER — ALBUTEROL 90 UG/1
0 AEROSOL, METERED ORAL
Refills: 0 | DISCHARGE

## 2024-04-10 RX ORDER — ACETAMINOPHEN 500 MG
650 TABLET ORAL EVERY 6 HOURS
Refills: 0 | Status: DISCONTINUED | OUTPATIENT
Start: 2024-04-10 | End: 2024-04-13

## 2024-04-10 RX ORDER — FUROSEMIDE 40 MG
80 TABLET ORAL ONCE
Refills: 0 | Status: DISCONTINUED | OUTPATIENT
Start: 2024-04-10 | End: 2024-04-10

## 2024-04-10 RX ORDER — IPRATROPIUM/ALBUTEROL SULFATE 18-103MCG
3 AEROSOL WITH ADAPTER (GRAM) INHALATION EVERY 6 HOURS
Refills: 0 | Status: DISCONTINUED | OUTPATIENT
Start: 2024-04-10 | End: 2024-04-13

## 2024-04-10 RX ADMIN — APIXABAN 5 MILLIGRAM(S): 2.5 TABLET, FILM COATED ORAL at 18:28

## 2024-04-10 RX ADMIN — Medication 3 MILLILITER(S): at 22:10

## 2024-04-10 RX ADMIN — Medication 40 MILLIGRAM(S): at 13:00

## 2024-04-10 RX ADMIN — Medication 200 MILLIGRAM(S): at 18:28

## 2024-04-10 NOTE — H&P ADULT - PROBLEM SELECTOR PLAN 2
Noted on differential, will repeat CBC with differential in AM  If remains elevated, will pursue eosinophilia evaluation

## 2024-04-10 NOTE — CONSULT NOTE ADULT - TIME BILLING
Review of patient records, including history, laboratory data, and imaging. Patient evaluation. Coordination of care. Time excludes  teaching or procedures.

## 2024-04-10 NOTE — CONSULT NOTE ADULT - SUBJECTIVE AND OBJECTIVE BOX
74 year old woman with history of paroxysmal Afib, NICM, HFrEF s/p biventricular ICD who is presenting for shortness of breath and productive cough with yellow sputum x 3 weeks. Patient daughter at bedside translating for patient per request. States patient was seen at [facility] (3/22 - 3/26) for similar symptoms and diagonosed with asthma exacerbation/ acute bronchitis. Patient was discharged on Pednisone 20 mg x 2 days and Azithromycin 500 mg QD x 7 days which she completed. Patient states her symptoms did not improve and so she came to ER today for worsening SOB and cough. Patient denies tobacco use, never smoker. Denies history of asthma or COPD.     Denies fever, chills, weakness, lightheadedness. Denies chest pain. Denies palpitations. Denies abdominal pain, nausea, vomiting or diarrhea.      in ED:   - afebrile 97.4F, HR 78, /65, O2 95% on RA and 100% on Bipap  - Given Hycodan cough syrup  - BiPAP settings 10/5/40%     Vital Signs Last 24 Hrs  T(C): 36.3 (10 Apr 2024 10:21), Max: 36.8 (10 Apr 2024 08:17)  T(F): 97.4 (10 Apr 2024 10:21), Max: 98.3 (10 Apr 2024 08:17)  HR: 78 (10 Apr 2024 10:21) (77 - 80)  BP: 109/65 (10 Apr 2024 10:21) (109/65 - 119/68)  BP(mean): --  RR: 29 (10 Apr 2024 10:21) (16 - 29)  SpO2: 100% (10 Apr 2024 10:21) (95% - 100%)    Parameters below as of 10 Apr 2024 10:21  Patient On (Oxygen Delivery Method): BiPAP/CPAP    PHYSICAL EXAM:  GENERAL: NAD  HEAD: atraumatic  NECK: supple, no JVD, trachea midline   CHEST/LUNG: Clear to ausculation bilaterally, no rales or wheezing noted - no accessory muscle use or respiratory distress - on Bipap currently   HEART: normal s1, s2, regular rate and rhythm   ABDOMEN: soft, nontender  EXTREMITIES:  No LE edema bilaterally   NEUROLOGY: Awake and alert, AAO x 3, conversing with daughters while in Bipap, follows commands   SKIN: no rashes or lesions

## 2024-04-10 NOTE — ED PROVIDER NOTE - ATTENDING APP SHARED VISIT CONTRIBUTION OF CARE
DR. TRUJILLO, ATTENDING MD-  I personally saw the patient with the PA and performed a substantive portion of the visit including all aspects of the medical decision making.    74-year-old female history of CHF 10 to 15% ejection fraction asthma A-fib on Eliquis AICD from nonischemic cardiomyopathy hyperlipidemia presenting with shortness of breath and cough.  Patient states that she was placed on albuterol and steroid without improvement from urgent care.  She denies chest pain syncope palpitations leg pain swelling or redness.  On exam patient is working to breathe tachypneic ill-appearing scant b/l wheeze at bases.  Likely viral syndrome with CHF exacerbation, consider anemia electrolyte abnormality bacterial pneumonia pneumothorax.  Will obtain EKG CBC CMP troponin BNP chest x-ray will place on BiPAP and give Lasix will need admission for further care and evaluation.

## 2024-04-10 NOTE — H&P ADULT - ASSESSMENT
74W PMH hypercoagulable state 2/2 chronic atrial fibrillation, systolic CHF s/p AICD presents with several weeks of cough and shortness of breath. She is admitted for suspected asthma exacerbation.

## 2024-04-10 NOTE — H&P ADULT - PROBLEM SELECTOR PLAN 1
Presents with dyspnea, wheezing; initially requiring BiPAP  Appreciate cardiology and pulmonary recommendations; less likely ADHF and more likely asthma exacerbation   c/w IV methylprednisolone, standing DuoNebs  monitor on  given recent BiPAP requirement; stable on NC for now (no hypoxia noted)  standing Robitussin x2 days for cough
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
## 2024-04-10 NOTE — CONSULT NOTE ADULT - SUBJECTIVE AND OBJECTIVE BOX
Date of Admission:    Patient is a 74y old  Female who presents with a chief complaint of     HISTORY OF PRESENT ILLNESS:   74 year old woman with history of paroxysmal Afib, NICM, HFrEF s/p biventricular ICD who is presenting for dyspnea and cough of 2 week duration. Patient recently admitted to [facility] 2 weeks ago for wheezing and dyspnea. Treated for acute asthma exacerbation/ acute bronchitis. Patient with progressively worsening dyspnea and coughing fits. Patient reports coughing worse at night, and orthopnea. No chest pain, palpitations, lower extremity edema, fever, nausea, vomiting, abdominal pain, or diarrhea.    ED: Placed on BiPAP 10/5/40%    Allergies    No Known Allergies    Intolerances    	    MEDICATIONS:    bumex 1 mg PO QD  eliquis 5 mg PO BID  spironolactone 25 mg PO QD  losartan 25 mg PO QD  xanax 0.25 mg PO QD                PAST MEDICAL & SURGICAL HISTORY:  Chronic atrial fibrillation      S/P ICD (internal cardiac defibrillator) procedure      Acute on chronic systolic congestive heart failure          FAMILY HISTORY:  See H&P      SOCIAL HISTORY:    See H&P      REVIEW OF SYSTEMS:    CONSTITUTIONAL: No weakness, fevers or chills  EYES/ENT: No visual changes;  No dysphagia  NECK: No pain or stiffness  RESPIRATORY: + cough. +wheezing, + shortness of breath, no hemoptysis  CARDIOVASCULAR: No chest pain or palpitations; No lower extremity edema  GASTROINTESTINAL: No abdominal or epigastric pain. No nausea, vomiting, or hematemesis; No diarrhea or constipation. No melena or hematochezia.  BACK: No back pain  GENITOURINARY: No dysuria, frequency or hematuria  NEUROLOGICAL: No numbness or weakness  SKIN: No itching, burning, rashes, or lesions   All other review of systems is negative unless indicated above.  PHYSICAL EXAM:  T(C): 36.3 (04-10-24 @ 10:21), Max: 36.8 (04-10-24 @ 08:17)  HR: 78 (04-10-24 @ 10:21) (77 - 80)  BP: 109/65 (04-10-24 @ 10:21) (109/65 - 119/68)  RR: 29 (04-10-24 @ 10:21) (16 - 29)  SpO2: 100% (04-10-24 @ 10:21) (95% - 100%)  Wt(kg): --  I&O's Summary      Appearance: uncomfortable.  HEENT:   Normal oral mucosa, PERRL, EOMI	  Lymphatic: No lymphadenopathy  Cardiovascular: Normal S1 S2, No JVD, No murmurs.  Respiratory: RLL expiratory wheeze. 	  Psychiatry: A & O x 3, Mood & affect appropriate  Gastrointestinal:  Soft, Non-tender, + BS	  Skin: No rashes, No ecchymoses, No cyanosis	  Neurologic: Non-focal  Extremities: Normal range of motion, No clubbing, cyanosis or edema  Vascular: Peripheral pulses palpable 2+ bilaterally        LABS:	 	    CBC Full  -  ( 10 Apr 2024 08:53 )  WBC Count : 10.14 K/uL  Hemoglobin : 12.0 g/dL  Hematocrit : 37.5 %  Platelet Count - Automated : 208 K/uL  Mean Cell Volume : 95.2 fL  Mean Cell Hemoglobin : 30.5 pg  Mean Cell Hemoglobin Concentration : 32.0 gm/dL  Auto Neutrophil # : 6.95 K/uL  Auto Lymphocyte # : 0.82 K/uL  Auto Monocyte # : 0.71 K/uL  Auto Eosinophil # : 1.54 K/uL  Auto Basophil # : 0.08 K/uL  Auto Neutrophil % : 68.5 %  Auto Lymphocyte % : 8.1 %  Auto Monocyte % : 7.0 %  Auto Eosinophil % : 15.2 %  Auto Basophil % : 0.8 %    04-10    133<L>  |  96<L>  |  16  ----------------------------<  136<H>  3.6   |  23  |  0.63    Ca    9.1      10 Apr 2024 08:53  Phos  3.6     04-10  Mg     2.10     04-10    TPro  6.6  /  Alb  3.9  /  TBili  0.5  /  DBili  x   /  AST  26  /  ALT  20  /  AlkPhos  67  04-10                    TELEMETRY: 	    ECG: A sensed, V Paced, 81 BPM.  	    PREVIOUS DIAGNOSTIC TESTING:

## 2024-04-10 NOTE — ED PROVIDER NOTE - PROGRESS NOTE DETAILS
KIRSTIN Harris: Pt returned from xray, now tachypneic, no audible wheeze, concern for CHF, ordered for IV lasix and will start BiPAP. MD CHO:  First time bipap.  Spoke with micu fellow Dr. Schuster, advise to discuss with ccu as likely d/t chf exac.  Will call cards. KIRSTIN Harris: Pt seen by CCU, state pt is not fluid overloaded, not recommending IV lasix at this time, spoke with MICU fellow, as pt likely will not require MICU level or care, recommends having pulm fellow evaluate pt for first time bipap. Spoke with pulm, will see pt in the ED. KIRSTIN Harris: Pt seen by pulmonary/RCU KIRSTIN Hess, saw pt in ED, ok for floor admission, no beds in RCU and pt does not have hx of pulmonary disease. Recommends to trial off Bipap in an hour. Discussed that typically pulm fellow clears first time Bipap patients for the medicine floor, states he discussed with his attending and they will do the consult for pulm fellow, no need to call pulm fellow to clear for medicine floor admission. KIRSTIN Harris: Pt seen by pulmonary/RCU KIRSTIN Hess, saw pt in ED, ok for floor admission, no beds in RCU and pt does not have hx of pulmonary disease. Recommends to trial off Bipap in an hour. Discussed that typically pulm fellow clears first time Bipap patients for the medicine floor, states he discussed with his attending and they will do the consult for pulm fellow, no need to call pulm fellow to clear for medicine floor admission, they will inform the pulm fellow. KIRSTIN Harris:  accepts admission on tele and continuous pulse ox. Pt aware of admission

## 2024-04-10 NOTE — ED PROCEDURE NOTE - PROCEDURE ADDITIONAL DETAILS
Emergency Department Focused Ultrasound performed at patient's bedside for educational purposes. An appropriate follow up study was ordered or the study was performed in the presence of a credentialed ultrasound attending physician.

## 2024-04-10 NOTE — CONSULT NOTE ADULT - ASSESSMENT
74 year old woman with history of paroxsymal Afib, NICM, HFrEF s/p biventricular ICD who is presenting for dyspnea and cough of 2 week duration.    #HFrEF  #Dyspnea  #Increased WOB  #Paroxysmal afib  #BiV ICD  proBNP 5213, troponin 23 --> 24  EKG showing A paced, V sensed rhythm  Patient presenting with increased work of breathing + wheezing, and eosinophilia likely asthma exacerbation vs primary lung pathology as etiology, less likely HF exacerbation  - Consult Pulm/MICU  - continue BiPAP  - Does not require diuresis at this time, euvolemic to dry  - Does not require CCU level of care at this time  - Restart HFrEF medications as tolerated    Dayday Feldman MD  PGY-2 74 year old woman with history of paroxsymal Afib, NICM, HFrEF s/p biventricular ICD who is presenting for dyspnea and cough of 2 week duration.    #HFrEF  #Dyspnea  #Increased WOB  #Paroxysmal afib  #BiV ICD  proBNP 5213, troponin 23 --> 24  EKG showing A paced, V sensed rhythm  Patient presenting with increased work of breathing + wheezing, and eosinophilia likely asthma exacerbation vs primary lung pathology as etiology, less likely HF exacerbation  - TTE  - Consult Pulm/MICU  - Continue BiPAP  - Does not require diuresis at this time, euvolemic to dry  - Does not require CCU level of care at this time  - Restart HFrEF medications as tolerated    Dayday Feldman MD  PGY-2 74 year old woman with history of paroxsymal Afib, NICM, HFrEF s/p biventricular ICD who is presenting for dyspnea and cough of 2 week duration.    #HFrEF  #Dyspnea  #Increased WOB  #Paroxysmal afib  #BiV ICD  proBNP 5213, troponin 23 --> 24  EKG showing A paced, V sensed rhythm  Patient presenting with increased work of breathing + wheezing, and eosinophilia likely asthma exacerbation vs primary lung pathology, less likely HF exacerbation  - TTE  - Consult Pulm/MICU  - Continue BiPAP  - Does not require diuresis at this time, euvolemic to dry  - Does not require CCU level of care at this time  - Restart HFrEF medications as tolerated    Dayday Feldman MD  PGY-2 74 year old woman with history of paroxsymal Afib, NICM, HFrEF s/p biventricular ICD who is presenting for dyspnea and cough of 2 week duration.    #HFrEF  #Dyspnea  #Increased WOB  #Paroxysmal afib  #BiV ICD  proBNP 5213, troponin 23 --> 24  EKG showing A paced, V sensed rhythm  Patient presenting with increased work of breathing + wheezing, and eosinophilia likely asthma exacerbation vs primary lung pathology, less likely HF exacerbation  - TTE  - Consult Pulm/MICU  - Continue BiPAP  - Does not require diuresis at this time, euvolemic to dry  - Does not require CCU level of care at this time  - Restart HFrEF medications as tolerated  - continue sulaiman Feldman MD  PGY-2 74 year old woman with history of paroxsymal Afib, NICM, HFrEF s/p biventricular ICD who is presenting for dyspnea and cough of 2 week duration.    #HFrEF  #Dyspnea  #Increased WOB  #Paroxysmal afib  #BiV ICD (Last interrogation 3/18/24)  proBNP 5213, troponin 23 --> 24  EKG showing A paced, V sensed rhythm  Patient presenting with increased work of breathing + wheezing, and eosinophilia likely asthma exacerbation vs primary lung pathology, less likely HF exacerbation  - TTE  - Consult Pulm/MICU  - Continue BiPAP  - Does not require diuresis at this time, euvolemic to dry  - Does not require CCU level of care at this time  - Restart HFrEF medications as tolerated  - continue sulaiman Feldman MD  PGY-2

## 2024-04-10 NOTE — CONSULT NOTE ADULT - ASSESSMENT
Assessment and Plan: 74 year old woman with history of paroxysmal Afib, NICM, HFrEF s/p biventricular ICD, recent admission to [facility] (3/22 - 3/26) for asthma exacerbation/ acute bronchitis presents to [facility] for worsening dyspnea and productive cough with yellow sputum x 3 weeks.     #Dyspnea  #Productive cough   - CBC w/o leukocytosis, RVP negative for COVID/Flu/RSV  - VBG stable 7.33/51/27  - Currently on Bipap 10/5/40%, would recommend trialing off Bipap and checking VBG in 1 hour   - CXR - clear lungs, no focal consolidations, no pleural effusions   - Recommend Duonebs Q6H standing for now   - IV Solumedrol 40 mg QD for now   - BNP elevated 5213 but dry on exam   - Patient would not be a candidate for RCU    Discussed with Dr Isaac Parrish PA-C,   Internal Medicine ACP / RCU   In house pager #[phone], [employer] [phone]      Assessment and Plan: 74 year old woman with history of paroxysmal Afib, NICM, HFrEF s/p biventricular ICD, recent admission to [facility] (3/22 - 3/26) for asthma exacerbation/ acute bronchitis presents to [facility] for worsening dyspnea and productive cough with yellow sputum x 3 weeks.     #Dyspnea  #Productive cough   - CBC w/o leukocytosis, RVP negative for COVID/Flu/RSV  - VBG stable 7.33/51/27  - Currently on Bipap 10/5/40%, would recommend trialing off Bipap and checking VBG in 1 hour   - CXR - clear lungs, no focal consolidations, no pleural effusions   - Recommend Duonebs Q6H standing for now   - IV Solumedrol 40 mg QD for now   - BNP elevated 5213 but dry on exam   - Patient would not be a RCU candidate at this time     Discussed with Dr Isaac Parrish PA-C,   Internal Medicine ACP / RCU   In house pager #[phone], [employer] [phone]      Assessment and Plan: 74 year old woman with history of paroxysmal Afib, NICM, HFrEF s/p biventricular ICD, recent admission to [facility] (3/22 - 3/26) for asthma exacerbation/ acute bronchitis presents to [facility] for worsening dyspnea and productive cough with yellow sputum x 3 weeks.     #Dyspnea  #Productive cough   - CBC w/o leukocytosis, RVP negative for COVID/Flu/RSV  - Eosinophil count noted to be elevated, recommend repeat CBC w/ differential and if elevated pursue work up for eosinophilic asthma   - VBG stable 7.33/51/27  - Currently on Bipap 10/5/40%, would recommend trialing off Bipap and checking VBG in 1 hour   - CXR - clear lungs, no focal consolidations, no pleural effusions   - Recommend Duonebs Q6H standing for now   - IV Solumedrol 40 mg QD for now   - BNP elevated 5213 but dry on exam   - Patient would not be a RCU candidate at this time     Discussed with Dr Isaac Parrish PA-C,   Internal Medicine ACP / RCU   In house pager #[phone], [employer] [phone]

## 2024-04-10 NOTE — CONSULT NOTE ADULT - NS ATTEND AMEND GEN_ALL_CORE FT
Agree with plan as outlined by ACP above. Pt is not an appropriate candidate for RCU admission at this time.

## 2024-04-10 NOTE — ED PROVIDER NOTE - OBJECTIVE STATEMENT
74-year-old female with past medical history of A-fib on Eliquis, CHF with most recent EF 10-15%, PE and presenting to the ER with cough and shortness of breath.  Patient's daughter providing Hebrew translation at bedside per patient request.  States approximately 3 weeks ago patient developed worsening of chronic cough associated with shortness of breath.  Patient was seen at outside hospital last week was prescribed prednisone and azithromycin as well as albuterol inhaler.  Patient continues to have shortness of breath with coughing.  Patient denies fever/chills, leg swelling, chest pain, palpitations, headache, dizziness, sinus congestion, sore throat, nausea, vomiting, diarrhea, known sick contacts or any other concerns. 74-year-old female with past medical history of A-fib on Eliquis, CHF with most recent EF 10-15%, and presenting to the ER with cough and shortness of breath.  Patient's daughter providing Eritrean translation at bedside per patient request.  States approximately 3 weeks ago patient developed worsening of chronic cough associated with shortness of breath.  Patient was seen at outside hospital last week was prescribed prednisone and azithromycin as well as albuterol inhaler.  Patient continues to have shortness of breath with coughing.  Patient denies fever/chills, leg swelling, chest pain, palpitations, headache, dizziness, sinus congestion, sore throat, nausea, vomiting, diarrhea, known sick contacts or any other concerns. 74-year-old female with past medical history of A-fib on Eliquis, CHF with most recent EF 10-15%, PPM, LV thrombus and presenting to the ER with cough and shortness of breath.  Patient's daughter providing Guyanese translation at bedside per patient request.  States approximately 3 weeks ago patient developed worsening of chronic cough associated with shortness of breath.  Patient was seen at outside hospital last week was prescribed prednisone and azithromycin as well as albuterol inhaler.  Patient continues to have shortness of breath with coughing.  Patient denies fever/chills, leg swelling, chest pain, palpitations, headache, dizziness, sinus congestion, sore throat, nausea, vomiting, diarrhea, known sick contacts or any other concerns.

## 2024-04-10 NOTE — ED ADULT NURSE NOTE - CAS DISCH BELONGINGS RETURNED
Specialty Pharmacy Refill Coordination Note     Lucila is a 52 y.o. female contacted today regarding refills of  mounjaro specialty medication(s).    Reviewed and verified with patient:       Specialty medication(s) and dose(s) confirmed: yes    Refill Questions    Flowsheet Row Most Recent Value   Changes to allergies? No   Changes to medications? No   New conditions since last clinic visit No   Unplanned office visit, urgent care, ED, or hospital admission in the last 4 weeks  No   How does patient/caregiver feel medication is working? Very good   Financial problems or insurance changes  No   Since the previous refill, were any specialty medication doses or scheduled injections missed or delayed?  No   Does this patient require a clinical escalation to a pharmacist? No          Delivery Questions    Flowsheet Row Most Recent Value   Delivery method [employer]   Delivery address correct? Yes   Delivery phone number [phone]   Number of medications in delivery 1   Medication being filled and delivered mounjaro   Is there any medication that is due not being filled? No   Supplies needed? No supplies needed   Cooler needed? Yes   Do any medications need mixed or dated? No   Copay form of payment Credit card on file   Questions or concerns for the pharmacist? No   Are any medications first time fills? No          Patient is still needing to training on her omnipod/dexcom and would like that set up when she sees Dr Chencho yu.  A message was sent to Jazmín in the office to set that up.  Mailing out RX Monday.        Follow-up: 1 month.      Antwan Haile  Specialty Pharmacy Technician       Not applicable

## 2024-04-10 NOTE — ED ADULT NURSE NOTE - OBJECTIVE STATEMENT
Patient is awake and alert, her daughter is ather bedside. patient has a h/o chf, pace maker, asthma. Patient is brought to the ed for 3 weeks of coughing , sob. wheezing with cough noted. IV placed, labs sent.  Awaiting cxr.

## 2024-04-10 NOTE — PATIENT PROFILE ADULT - FALL HARM RISK - HARM RISK INTERVENTIONS

## 2024-04-11 LAB
ANION GAP SERPL CALC-SCNC: 14 MMOL/L — SIGNIFICANT CHANGE UP (ref 7–14)
BASOPHILS # BLD AUTO: 0.02 K/UL — SIGNIFICANT CHANGE UP (ref 0–0.2)
BASOPHILS NFR BLD AUTO: 0.3 % — SIGNIFICANT CHANGE UP (ref 0–2)
BUN SERPL-MCNC: 16 MG/DL — SIGNIFICANT CHANGE UP (ref 7–23)
CALCIUM SERPL-MCNC: 9.4 MG/DL — SIGNIFICANT CHANGE UP (ref 8.4–10.5)
CHLORIDE SERPL-SCNC: 97 MMOL/L — LOW (ref 98–107)
CO2 SERPL-SCNC: 21 MMOL/L — LOW (ref 22–31)
CREAT SERPL-MCNC: 0.6 MG/DL — SIGNIFICANT CHANGE UP (ref 0.5–1.3)
EGFR: 94 ML/MIN/1.73M2 — SIGNIFICANT CHANGE UP
EOSINOPHIL # BLD AUTO: 0.31 K/UL — SIGNIFICANT CHANGE UP (ref 0–0.5)
EOSINOPHIL # BLD: 310 /UL — SIGNIFICANT CHANGE UP (ref 50–350)
EOSINOPHIL NFR BLD AUTO: 4.4 % — SIGNIFICANT CHANGE UP (ref 0–6)
GLUCOSE SERPL-MCNC: 124 MG/DL — HIGH (ref 70–99)
HCT VFR BLD CALC: 36.2 % — SIGNIFICANT CHANGE UP (ref 34.5–45)
HGB BLD-MCNC: 11.9 G/DL — SIGNIFICANT CHANGE UP (ref 11.5–15.5)
IANC: 5.27 K/UL — SIGNIFICANT CHANGE UP (ref 1.8–7.4)
IMM GRANULOCYTES NFR BLD AUTO: 0.3 % — SIGNIFICANT CHANGE UP (ref 0–0.9)
LYMPHOCYTES # BLD AUTO: 0.82 K/UL — LOW (ref 1–3.3)
LYMPHOCYTES # BLD AUTO: 11.6 % — LOW (ref 13–44)
MAGNESIUM SERPL-MCNC: 2.1 MG/DL — SIGNIFICANT CHANGE UP (ref 1.6–2.6)
MCHC RBC-ENTMCNC: 31.2 PG — SIGNIFICANT CHANGE UP (ref 27–34)
MCHC RBC-ENTMCNC: 32.9 GM/DL — SIGNIFICANT CHANGE UP (ref 32–36)
MCV RBC AUTO: 94.8 FL — SIGNIFICANT CHANGE UP (ref 80–100)
MONOCYTES # BLD AUTO: 0.64 K/UL — SIGNIFICANT CHANGE UP (ref 0–0.9)
MONOCYTES NFR BLD AUTO: 9 % — SIGNIFICANT CHANGE UP (ref 2–14)
NEUTROPHILS # BLD AUTO: 5.27 K/UL — SIGNIFICANT CHANGE UP (ref 1.8–7.4)
NEUTROPHILS NFR BLD AUTO: 74.4 % — SIGNIFICANT CHANGE UP (ref 43–77)
NRBC # BLD: 0 /100 WBCS — SIGNIFICANT CHANGE UP (ref 0–0)
NRBC # FLD: 0 K/UL — SIGNIFICANT CHANGE UP (ref 0–0)
PHOSPHATE SERPL-MCNC: 3.6 MG/DL — SIGNIFICANT CHANGE UP (ref 2.5–4.5)
PLATELET # BLD AUTO: 227 K/UL — SIGNIFICANT CHANGE UP (ref 150–400)
POTASSIUM SERPL-MCNC: 3.9 MMOL/L — SIGNIFICANT CHANGE UP (ref 3.5–5.3)
POTASSIUM SERPL-SCNC: 3.9 MMOL/L — SIGNIFICANT CHANGE UP (ref 3.5–5.3)
RBC # BLD: 3.82 M/UL — SIGNIFICANT CHANGE UP (ref 3.8–5.2)
RBC # FLD: 13.4 % — SIGNIFICANT CHANGE UP (ref 10.3–14.5)
SODIUM SERPL-SCNC: 132 MMOL/L — LOW (ref 135–145)
WBC # BLD: 7.08 K/UL — SIGNIFICANT CHANGE UP (ref 3.8–10.5)
WBC # FLD AUTO: 7.08 K/UL — SIGNIFICANT CHANGE UP (ref 3.8–10.5)

## 2024-04-11 PROCEDURE — 99233 SBSQ HOSP IP/OBS HIGH 50: CPT

## 2024-04-11 RX ORDER — CHLORHEXIDINE GLUCONATE 213 G/1000ML
1 SOLUTION TOPICAL DAILY
Refills: 0 | Status: DISCONTINUED | OUTPATIENT
Start: 2024-04-11 | End: 2024-04-13

## 2024-04-11 RX ADMIN — Medication 25 MILLIGRAM(S): at 05:13

## 2024-04-11 RX ADMIN — Medication 3 MILLILITER(S): at 16:20

## 2024-04-11 RX ADMIN — Medication 200 MILLIGRAM(S): at 05:14

## 2024-04-11 RX ADMIN — Medication 3 MILLILITER(S): at 04:37

## 2024-04-11 RX ADMIN — SPIRONOLACTONE 25 MILLIGRAM(S): 25 TABLET, FILM COATED ORAL at 05:13

## 2024-04-11 RX ADMIN — Medication 3 MILLILITER(S): at 21:13

## 2024-04-11 RX ADMIN — Medication 1200 MILLIGRAM(S): at 17:30

## 2024-04-11 RX ADMIN — Medication 40 MILLIGRAM(S): at 05:13

## 2024-04-11 RX ADMIN — LOSARTAN POTASSIUM 25 MILLIGRAM(S): 100 TABLET, FILM COATED ORAL at 05:13

## 2024-04-11 RX ADMIN — Medication 3 MILLILITER(S): at 10:40

## 2024-04-11 RX ADMIN — Medication 200 MILLIGRAM(S): at 11:20

## 2024-04-11 RX ADMIN — APIXABAN 5 MILLIGRAM(S): 2.5 TABLET, FILM COATED ORAL at 05:13

## 2024-04-11 RX ADMIN — Medication 200 MILLIGRAM(S): at 00:42

## 2024-04-11 RX ADMIN — Medication 3 MILLIGRAM(S): at 21:25

## 2024-04-11 RX ADMIN — Medication 100 MILLIGRAM(S): at 21:24

## 2024-04-11 RX ADMIN — Medication 100 MILLIGRAM(S): at 12:37

## 2024-04-11 RX ADMIN — APIXABAN 5 MILLIGRAM(S): 2.5 TABLET, FILM COATED ORAL at 17:30

## 2024-04-11 NOTE — CONSULT NOTE ADULT - SUBJECTIVE AND OBJECTIVE BOX
Date of Admission:    Patient is a 74y old  Female who presents with a chief complaint of     HISTORY OF PRESENT ILLNESS:   74 year old woman with history of paroxysmal Afib, NICM, HFrEF s/p biventricular ICD who is presenting for dyspnea and cough of 2 week duration. Patient recently admitted to [facility] 2 weeks ago for wheezing and dyspnea. Treated for acute asthma exacerbation/ acute bronchitis. Patient with progressively worsening dyspnea and coughing fits. Patient reports coughing worse at night, and orthopnea. No chest pain, palpitations, lower extremity edema, fever, nausea, vomiting, abdominal pain, or diarrhea.    ED: Placed on BiPAP 10/5/40%    Hospital Course: Weaned off bipap, now on RA. Given solumedrol & duonebs. Reporting improvement in dyspnea, cough improved. No chest pain.     Tele Vpaced 70-80s.     Allergies    No Known Allergies    Intolerances    	    MEDICATIONS:  apixaban 5 milliGRAM(s) Oral every 12 hours  losartan 25 milliGRAM(s) Oral daily  metoprolol succinate ER 25 milliGRAM(s) Oral daily  spironolactone 25 milliGRAM(s) Oral daily      albuterol    90 MICROgram(s) HFA Inhaler 2 Puff(s) Inhalation daily  albuterol/ipratropium for Nebulization 3 milliLiter(s) Nebulizer every 6 hours  guaiFENesin Oral Liquid (Sugar-Free) 200 milliGRAM(s) Oral every 6 hours    acetaminophen     Tablet .. 650 milliGRAM(s) Oral every 6 hours PRN  melatonin 3 milliGRAM(s) Oral at bedtime PRN  ondansetron Injectable 4 milliGRAM(s) IV Push every 8 hours PRN      dapagliflozin 10 milliGRAM(s) Oral every 24 hours  methylPREDNISolone sodium succinate Injectable 40 milliGRAM(s) IV Push daily    influenza  Vaccine (HIGH DOSE) 0.7 milliLiter(s) IntraMuscular once      PAST MEDICAL & SURGICAL HISTORY:  Chronic atrial fibrillation      S/P ICD (internal cardiac defibrillator) procedure      Acute on chronic systolic congestive heart failure          FAMILY HISTORY:    See HPI  SOCIAL HISTORY:    See HPI    REVIEW OF SYSTEMS:    CONSTITUTIONAL: No weakness, fevers or chills  EYES/ENT: No visual changes;  No dysphagia  NECK: No pain or stiffness  RESPIRATORY: + cough, + shortness of breath.   CARDIOVASCULAR: No chest pain or palpitations; No lower extremity edema  GASTROINTESTINAL: No abdominal or epigastric pain. No nausea, vomiting, or hematemesis; No diarrhea or constipation. No melena or hematochezia.  BACK: No back pain  GENITOURINARY: No dysuria, frequency or hematuria  NEUROLOGICAL: No numbness or weakness  SKIN: No itching, burning, rashes, or lesions   All other review of systems is negative unless indicated above.  PHYSICAL EXAM:  T(C): 36.9 (04-10-24 @ 20:35), Max: 37.1 (04-10-24 @ 15:49)  HR: 76 (04-10-24 @ 20:35) (72 - 78)  BP: 104/59 (04-10-24 @ 20:35) (100/62 - 147/50)  RR: 18 (04-10-24 @ 20:35) (18 - 20)  SpO2: 97% (04-10-24 @ 20:35) (96% - 100%)  Wt(kg): --  I&O's Summary      Appearance: Normal	  HEENT:   Normal oral mucosa, PERRL, EOMI	  Lymphatic: No lymphadenopathy  Cardiovascular: Normal S1 S2, No JVD, No murmurs, No edema  Respiratory: RLL Expiratory wheeze improved.   Psychiatry: A & O x 3, Mood & affect appropriate  Gastrointestinal:  Soft, Non-tender, + BS	  Skin: No rashes, No ecchymoses, No cyanosis	  Neurologic: Non-focal  Extremities: Normal range of motion, No clubbing, cyanosis or edema  Vascular: Peripheral pulses palpable 2+ bilaterally        LABS:	 	    CBC Full  -  ( 11 Apr 2024 06:00 )  WBC Count : 7.08 K/uL  Hemoglobin : 11.9 g/dL  Hematocrit : 36.2 %  Platelet Count - Automated : 227 K/uL  Mean Cell Volume : 94.8 fL  Mean Cell Hemoglobin : 31.2 pg  Mean Cell Hemoglobin Concentration : 32.9 gm/dL  Auto Neutrophil # : 5.27 K/uL  Auto Lymphocyte # : 0.82 K/uL  Auto Monocyte # : 0.64 K/uL  Auto Eosinophil # : 0.31 K/uL  Auto Basophil # : 0.02 K/uL  Auto Neutrophil % : 74.4 %  Auto Lymphocyte % : 11.6 %  Auto Monocyte % : 9.0 %  Auto Eosinophil % : 4.4 %  Auto Basophil % : 0.3 %    04-11    132<L>  |  97<L>  |  16  ----------------------------<  124<H>  3.9   |  21<L>  |  0.60  04-10    133<L>  |  96<L>  |  16  ----------------------------<  136<H>  3.6   |  23  |  0.63    Ca    9.4      11 Apr 2024 06:00  Ca    9.1      10 Apr 2024 08:53  Phos  3.6     04-11  Phos  3.6     04-10  Mg     2.10     04-11  Mg     2.10     04-10    TPro  6.6  /  Alb  3.9  /  TBili  0.5  /  DBili  x   /  AST  26  /  ALT  20  /  AlkPhos  67  04-10            CARDIAC MARKERS:              	    PREVIOUS DIAGNOSTIC TESTING:    [ ] Echocardiogram:  [ ]  Catheterization:  [ ] Stress Test:  	  	  ASSESSMENT/PLAN:

## 2024-04-11 NOTE — CONSULT NOTE ADULT - ATTENDING COMMENTS
personally saw and examined patient  labs and vitals reviewed  agree with above assessment and plan  sob improved, off bipap  while pt with known HFrEF, pt is compensated from volume perspective and symptoms less likely due to cardiac etiology  appreciate pulm recs
personally saw and examined pt  labs and vitals reviewed  agree with above assessment and plan  pt with known HFrEF, follows at [facility], on GDMT at home with bb, arb, aldactone, farxiga  appears grossly euvolemic on exam  no CP  trop negative x2  on arrival pt with reps distress, tachypneic to 30s  on bipap  lower suspicion for cardiac etiology here, appreciate pulm recs

## 2024-04-11 NOTE — CONSULT NOTE ADULT - ASSESSMENT
74 year old woman with history of paroxsymal Afib, NICM, HFrEF s/p biventricular ICD who is presenting for dyspnea and cough of 2 week duration.    #HFrEF  #Dyspnea  #Increased WOB  #Paroxysmal afib  #BiV ICD (Last interrogation 3/18/24)  proBNP 5213, troponin 23 --> 24  EKG showing A paced, V sensed rhythm  Patient presenting with increased work of breathing + wheezing, and eosinophilia likely asthma exacerbation, less likely HF exacerbation.   - TTE  - c/w losartan 25  - c/w metoprolol succinate 25  - c/w farxiga 10 mg  - c/w spironolactone 25 mg   - c/w eliquis  - Euvolemic on exam  - Cardiology will sign-off, please reconsult as needed.        Dayday Feldman MD  PGY-2

## 2024-04-12 ENCOUNTER — TRANSCRIPTION ENCOUNTER (OUTPATIENT)
Age: 74
End: 2024-04-12

## 2024-04-12 LAB
ANION GAP SERPL CALC-SCNC: 14 MMOL/L — SIGNIFICANT CHANGE UP (ref 7–14)
BASOPHILS # BLD AUTO: 0.07 K/UL — SIGNIFICANT CHANGE UP (ref 0–0.2)
BASOPHILS NFR BLD AUTO: 0.8 % — SIGNIFICANT CHANGE UP (ref 0–2)
BUN SERPL-MCNC: 25 MG/DL — HIGH (ref 7–23)
CALCIUM SERPL-MCNC: 9.2 MG/DL — SIGNIFICANT CHANGE UP (ref 8.4–10.5)
CHLORIDE SERPL-SCNC: 101 MMOL/L — SIGNIFICANT CHANGE UP (ref 98–107)
CO2 SERPL-SCNC: 19 MMOL/L — LOW (ref 22–31)
CREAT SERPL-MCNC: 0.76 MG/DL — SIGNIFICANT CHANGE UP (ref 0.5–1.3)
EGFR: 82 ML/MIN/1.73M2 — SIGNIFICANT CHANGE UP
EOSINOPHIL # BLD AUTO: 0.62 K/UL — HIGH (ref 0–0.5)
EOSINOPHIL NFR BLD AUTO: 6.8 % — HIGH (ref 0–6)
GLUCOSE SERPL-MCNC: 106 MG/DL — HIGH (ref 70–99)
HCT VFR BLD CALC: 35.4 % — SIGNIFICANT CHANGE UP (ref 34.5–45)
HGB BLD-MCNC: 11.8 G/DL — SIGNIFICANT CHANGE UP (ref 11.5–15.5)
IANC: 6.2 K/UL — SIGNIFICANT CHANGE UP (ref 1.8–7.4)
IMM GRANULOCYTES NFR BLD AUTO: 0.2 % — SIGNIFICANT CHANGE UP (ref 0–0.9)
LYMPHOCYTES # BLD AUTO: 1.43 K/UL — SIGNIFICANT CHANGE UP (ref 1–3.3)
LYMPHOCYTES # BLD AUTO: 15.8 % — SIGNIFICANT CHANGE UP (ref 13–44)
MAGNESIUM SERPL-MCNC: 2.1 MG/DL — SIGNIFICANT CHANGE UP (ref 1.6–2.6)
MCHC RBC-ENTMCNC: 31.7 PG — SIGNIFICANT CHANGE UP (ref 27–34)
MCHC RBC-ENTMCNC: 33.3 GM/DL — SIGNIFICANT CHANGE UP (ref 32–36)
MCV RBC AUTO: 95.2 FL — SIGNIFICANT CHANGE UP (ref 80–100)
MONOCYTES # BLD AUTO: 0.72 K/UL — SIGNIFICANT CHANGE UP (ref 0–0.9)
MONOCYTES NFR BLD AUTO: 7.9 % — SIGNIFICANT CHANGE UP (ref 2–14)
MRSA PCR RESULT.: SIGNIFICANT CHANGE UP
NEUTROPHILS # BLD AUTO: 6.2 K/UL — SIGNIFICANT CHANGE UP (ref 1.8–7.4)
NEUTROPHILS NFR BLD AUTO: 68.5 % — SIGNIFICANT CHANGE UP (ref 43–77)
NRBC # BLD: 0 /100 WBCS — SIGNIFICANT CHANGE UP (ref 0–0)
NRBC # FLD: 0 K/UL — SIGNIFICANT CHANGE UP (ref 0–0)
PHOSPHATE SERPL-MCNC: 3 MG/DL — SIGNIFICANT CHANGE UP (ref 2.5–4.5)
PLATELET # BLD AUTO: 237 K/UL — SIGNIFICANT CHANGE UP (ref 150–400)
POTASSIUM SERPL-MCNC: 3.9 MMOL/L — SIGNIFICANT CHANGE UP (ref 3.5–5.3)
POTASSIUM SERPL-SCNC: 3.9 MMOL/L — SIGNIFICANT CHANGE UP (ref 3.5–5.3)
RBC # BLD: 3.72 M/UL — LOW (ref 3.8–5.2)
RBC # FLD: 13.7 % — SIGNIFICANT CHANGE UP (ref 10.3–14.5)
S AUREUS DNA NOSE QL NAA+PROBE: DETECTED
SODIUM SERPL-SCNC: 134 MMOL/L — LOW (ref 135–145)
WBC # BLD: 9.06 K/UL — SIGNIFICANT CHANGE UP (ref 3.8–10.5)
WBC # FLD AUTO: 9.06 K/UL — SIGNIFICANT CHANGE UP (ref 3.8–10.5)

## 2024-04-12 PROCEDURE — 99233 SBSQ HOSP IP/OBS HIGH 50: CPT

## 2024-04-12 RX ORDER — LANOLIN ALCOHOL/MO/W.PET/CERES
3 CREAM (GRAM) TOPICAL AT BEDTIME
Refills: 0 | Status: DISCONTINUED | OUTPATIENT
Start: 2024-04-12 | End: 2024-04-13

## 2024-04-12 RX ADMIN — APIXABAN 5 MILLIGRAM(S): 2.5 TABLET, FILM COATED ORAL at 18:12

## 2024-04-12 RX ADMIN — DAPAGLIFLOZIN 10 MILLIGRAM(S): 10 TABLET, FILM COATED ORAL at 12:01

## 2024-04-12 RX ADMIN — Medication 100 MILLIGRAM(S): at 06:04

## 2024-04-12 RX ADMIN — Medication 3 MILLILITER(S): at 16:03

## 2024-04-12 RX ADMIN — Medication 3 MILLILITER(S): at 08:49

## 2024-04-12 RX ADMIN — Medication 3 MILLILITER(S): at 03:53

## 2024-04-12 RX ADMIN — Medication 100 MILLIGRAM(S): at 12:01

## 2024-04-12 RX ADMIN — SPIRONOLACTONE 25 MILLIGRAM(S): 25 TABLET, FILM COATED ORAL at 06:11

## 2024-04-12 RX ADMIN — Medication 25 MILLIGRAM(S): at 06:11

## 2024-04-12 RX ADMIN — Medication 3 MILLIGRAM(S): at 22:25

## 2024-04-12 RX ADMIN — Medication 1200 MILLIGRAM(S): at 06:04

## 2024-04-12 RX ADMIN — Medication 3 MILLILITER(S): at 21:43

## 2024-04-12 RX ADMIN — Medication 40 MILLIGRAM(S): at 06:04

## 2024-04-12 RX ADMIN — Medication 1200 MILLIGRAM(S): at 18:13

## 2024-04-12 RX ADMIN — Medication 100 MILLIGRAM(S): at 22:25

## 2024-04-12 RX ADMIN — CHLORHEXIDINE GLUCONATE 1 APPLICATION(S): 213 SOLUTION TOPICAL at 12:01

## 2024-04-12 RX ADMIN — APIXABAN 5 MILLIGRAM(S): 2.5 TABLET, FILM COATED ORAL at 06:04

## 2024-04-12 RX ADMIN — LOSARTAN POTASSIUM 25 MILLIGRAM(S): 100 TABLET, FILM COATED ORAL at 06:11

## 2024-04-12 NOTE — DISCHARGE NOTE PROVIDER - NSDCFUSCHEDAPPT_GEN_ALL_CORE_FT
Patient ambulated to bed 10. [facility] Physician [facility] [id number]  Scheduled Appointment: 06/17/2024     Deshawn Ritchie  [facility]  CARDIOLOGY [address]  Scheduled Appointment: 05/23/2024    [facility] [phone]  Scheduled Appointment: 06/17/2024    [facility]  [facility] [address]  Scheduled Appointment: 06/19/2024    [address]  Scheduled Appointment: 06/19/2024

## 2024-04-12 NOTE — DISCHARGE NOTE PROVIDER - ATTENDING DISCHARGE PHYSICAL EXAMINATION:
T(C): 36.7 (13 Apr 2024 11:40), Max: 37.1 (12 Apr 2024 20:36)  T(F): 98 (13 Apr 2024 11:40), Max: 98.7 (12 Apr 2024 20:36)  HR: 81 (13 Apr 2024 11:40) (81 - 84)  BP: 99/56 (13 Apr 2024 11:40) (99/56 - 127/62)  BP(mean): --  RR: 16 (13 Apr 2024 11:40) (16 - 17)  SpO2: 95% (13 Apr 2024 11:40) (95% - 97%)    CONSTITUTIONAL: No acute distress. Awake and alert.  RESPIRATORY: CTAB. No wheezes, rales, or rhonchi. No accessory muscle use. No apparent respiratory distress.  CARDIOVASCULAR: +S1/S2. No audible S3/S4. Regular rate and rhythm. No murmurs, rubs, or gallops. No LE swelling or edema.  GASTROINTESTINAL: Soft, nontender, nondistended. +BS. No rebound or guarding.   MUSCULOSKELETAL: Spontaneous movement in all extremities.  NEUROLOGICAL: CN 2-12 grossly intact. No focal deficits. Sensation intact x 4EXT.   PSYCHIATRIC: Appropriate affect. A&Ox3 (oriented to person, place, and time).    74W PMHx hypercoagulable state 2/2 chronic atrial fibrillation, systolic CHF s/p AICD presents with several weeks of cough and shortness of breath. She is admitted for suspected asthma exacerbation, clinically improved, tentative for discharge.

## 2024-04-12 NOTE — DISCHARGE NOTE PROVIDER - HOSPITAL COURSE
74W PMH hypercoagulable state 2/2 chronic atrial fibrillation, systolic CHF s/p AICD presents with several weeks of cough and shortness of breath. She is admitted for suspected asthma exacerbation    ·  Problem: Acute asthma exacerbation.   ·  Plan: Presents with dyspnea, wheezing; initially requiring BiPAP-acute hypoxemic respiratory failure-  Appreciate cardiology and pulmonary recommendations; less likely ADHF and more likely asthma exacerbation  - RVP negative for COVID/Flu/RSV  - S/p IV methylprednisolone 40mg for 4 days, continue prednisone 40mg daily for 5 more days  - C/w standing DuoNebs (pt has nebulizer machine at home)  - Initial eosinophilia has now resolved, ?d/t steroids  - Tessalon/mucinex ATC  - Off BIPAP, saturating well on RA, AHRF has now resolved  - Monitor on  given recent BiPAP requirement.    ·  Problem: Eosinophilia.   ·  Plan: Noted on differential on admission  - Repeat CBC with differential shows resolution of eosinophilia, ?d/t steroids  - F/up Ig E for completion.  ·  Problem: Hypercoagulable state due to chronic atrial fibrillation.   ·  Plan: c/w home dose Eliquis, metoprolol.    ·  Problem: Systolic heart failure, chronic.   ·  Plan: Clinically euvolemic, s/p AICD  - TTE 7/2023 with EF 15&  - c/w metoprolol, Farxiga, spironolactone, losartan  - Cards rec apprec.    PT eval --> No skilled needs.  Medically stable for discharge with outpatient follow up with PMD and Cardiology.   74W PMH hypercoagulable state 2/2 chronic atrial fibrillation, systolic CHF s/p AICD presents with several weeks of cough and shortness of breath. She is admitted for suspected asthma exacerbation    ·  Problem: Acute asthma exacerbation.   ·  Plan: Presents with dyspnea, wheezing; initially requiring BiPAP-acute hypoxemic respiratory failure-  Appreciate cardiology and pulmonary recommendations; less likely ADHF and more likely asthma exacerbation  - RVP negative for COVID/Flu/RSV  - S/p IV methylprednisolone 40mg for 4 days, continue prednisone 40mg daily for 5 more days  - C/w standing DuoNebs (pt has nebulizer machine at home)  - Initial eosinophilia has now resolved, ?d/t steroids  - Tessalon/mucinex ATC  - Off BIPAP, saturating well on RA, AHRF has now resolved  - Monitor on  given recent BiPAP requirement.    ·  Problem: Eosinophilia.   ·  Plan: Noted on differential on admission  - Repeat CBC with differential shows resolution of eosinophilia, ?d/t steroids  - F/up Ig E for completion.  ·  Problem: Hypercoagulable state due to chronic atrial fibrillation.   ·  Plan: c/w home dose Eliquis, metoprolol.    ·  Problem: Systolic heart failure, chronic.   ·  Plan: Clinically euvolemic, s/p AICD  - TTE 7/2023 with EF 15&  - c/w metoprolol, Farxiga, spironolactone, losartan  - Cards rec apprec.    PT eval --> No skilled needs.  Medically stable for discharge with outpatient follow up with PMD and Cardiology.    Addendum 4/14: Notified by RN pt called and reported that prednisone and tessalon was not received by pharmacy; chart reviewed and medication resent.   74W PMH hypercoagulable state 2/2 chronic atrial fibrillation, systolic CHF s/p AICD presents with several weeks of cough and shortness of breath. She is admitted for suspected asthma exacerbation    ·  Problem: Acute asthma exacerbation.   ·  Plan: Presents with dyspnea, wheezing; initially requiring BiPAP-acute hypoxemic respiratory failure-  Appreciate cardiology and pulmonary recommendations; less likely ADHF and more likely asthma exacerbation  - RVP negative for COVID/Flu/RSV  - S/p IV methylprednisolone 40mg for 4 days, continue prednisone 40mg daily for 5 more days  - C/w standing DuoNebs (pt has nebulizer machine at home)  - Initial eosinophilia has now resolved, ?d/t steroids  - Tessalon/mucinex ATC  - Off BIPAP, saturating well on RA, AHRF has now resolved  - Monitor on  given recent BiPAP requirement.    ·  Problem: Eosinophilia.   ·  Plan: Noted on differential on admission  - Repeat CBC with differential shows resolution of eosinophilia, ?d/t steroids  - F/up Ig E for completion.  ·  Problem: Hypercoagulable state due to chronic atrial fibrillation.   ·  Plan: c/w home dose Eliquis, metoprolol.    ·  Problem: Systolic heart failure, chronic.   ·  Plan: Clinically euvolemic, s/p AICD  - TTE 7/2023 with EF 15&  - c/w metoprolol, Farxiga, spironolactone, losartan  - Cards rec apprec.    PT eval --> No skilled needs.  Medically stable for discharge with outpatient follow up with PMD and Cardiology.    Addendum 4/14: Notified by RN pt called and reported that prednisone and tessalon was not received by pharmacy; chart reviewed and medication resent.    On date of discharge, patient was evaluated and determined to be stable. She was assessed to be an appropriate candidate for discharge.

## 2024-04-12 NOTE — DISCHARGE NOTE PROVIDER - PROVIDER TOKENS
PROVIDER:[TOKEN:[[id number]]],PROVIDER:[TOKEN:[[id number]]],PROVIDER:[TOKEN:[[id number]]]

## 2024-04-12 NOTE — DISCHARGE NOTE PROVIDER - CARE PROVIDER_API CALL
Dayna Gray [facility]  Critical Care Medicine  [address], [address]  [place] [address]  Phone: [phone]  Fax: [phone]  Follow Up Time:     Mika Dockery  Cardiovascular Disease  [address] [place] [address]  Phone: [phone]  Fax: [phone]  Follow Up Time:     ANNITA GARCIA  [address] [place] [id number]  Phone: [phone]  Fax: [phone]  Follow Up Time:

## 2024-04-12 NOTE — PHYSICAL THERAPY INITIAL EVALUATION ADULT - ADDITIONAL COMMENTS
Pt prefers daughter to interpret/answer questions.  As per daughter, pt lives with her other daughter in a house with 4 steps to enter and 1 flight inside. Prior to admission, pt was ambulating independently without any assistive devices; no recent falls.   Post PT evaluation, pt left semi-supine, alarm on, call bell and remote within reach, all precautions maintained, NAD. RN aware.

## 2024-04-12 NOTE — DISCHARGE NOTE NURSING/CASE MANAGEMENT/SOCIAL WORK - NSDCPEFALRISK_GEN_ALL_CORE
For information on Fall & Injury Prevention, visit: [email or web address] OR  [email or web address] OR  https://www.cdc.gov/steadi/patient.html

## 2024-04-12 NOTE — DISCHARGE NOTE PROVIDER - CARE PROVIDERS DIRECT ADDRESSES
,DirectAddress_Unknown,[email or web address]

## 2024-04-12 NOTE — DISCHARGE NOTE NURSING/CASE MANAGEMENT/SOCIAL WORK - PATIENT PORTAL LINK FT
You can access the FollowMyHealth Patient Portal offered by [facility] by registering at the following website: [email or web address]. By joining [facility]’s FollowMyHealth portal, you will also be able to view your health information using other applications (apps) compatible with our system.

## 2024-04-12 NOTE — DISCHARGE NOTE PROVIDER - NSDCMRMEDTOKEN_GEN_ALL_CORE_FT
Eliquis 5 mg oral tablet: 1 tab(s) orally 2 times a day  Farxiga 10 mg oral tablet: 1 tab(s) orally once a day  losartan 25 mg oral tablet: 1 tab(s) orally once a day  Metoprolol Succinate ER 25 mg oral tablet, extended release: 1 tab(s) orally once a day  spironolactone 25 mg oral tablet: 1 tab(s) orally once a day  Ventolin 90 mcg/inh inhalation aerosol: inhaled every 8 hours as needed for  wheezing   benzonatate 100 mg oral capsule: 1 cap(s) orally every 8 hours as needed for  cough  Eliquis 5 mg oral tablet: 1 tab(s) orally 2 times a day  Farxiga 10 mg oral tablet: 1 tab(s) orally once a day  losartan 25 mg oral tablet: 1 tab(s) orally once a day  Metoprolol Succinate ER 25 mg oral tablet, extended release: 1 tab(s) orally once a day  predniSONE 20 mg oral tablet: 2 tab(s) orally once a day  spironolactone 25 mg oral tablet: 1 tab(s) orally once a day  Ventolin 90 mcg/inh inhalation aerosol: inhaled every 8 hours as needed for  wheezing

## 2024-04-12 NOTE — PHYSICAL THERAPY INITIAL EVALUATION ADULT - NSPTDISCHREC_GEN_A_CORE
Pt is at her functional baseline and will not be placed on PT program. Please reconsult if indicated./No skilled PT needs

## 2024-04-12 NOTE — PROGRESS NOTE ADULT - TIME BILLING
Problem: Cardiovascular - Adult  Goal: Maintains optimal cardiac output and hemodynamic stability  Description: INTERVENTIONS:  1. Monitor vital signs and rhythm  2. Monitor for hypotension and other signs of decreased cardiac output  3. Administer and titrate ordered vasoactive medications to optimize hemodynamic stability  4. Monitor for fluid overload/dehydration, weight gain, shortness of breath and activity intolerance  5. Monitor arterial and/or venous puncture sites for bleeding and/or hematoma  6. Assess quality of pulses, capillary refill, edema, sensation, skin color and temperature  7. Assess for signs of decreased coronary artery perfusion - ex. angina  Outcome: Progressing  Flowsheets (Taken 6/7/2020 1257)  Maintain optimal cardiac output and hemodynamic stability:   Monitor vital signs and rhythm   Monitor for hypotension and other signs of decreased cardiac output   Monitor for fluid overload/dehydration, weight gain, shortness of breath and activity intolerance   Assess quality of pulses, capillary refill, edema, sensation, skin color and temperature   Assess for signs of decreased coronary artery perfusion - ex. angina  Goal: Absence of cardiac dysrhythmias or at baseline  Description: INTERVENTIONS:  1. Continuous cardiac monitoring, monitor vital signs, obtain 12 lead EKG if indicated  2. Administer antiarrhythmic and heart rate control medications as ordered  3. Initiate emergency measures for life threatening arrhythmias  4. Monitor electrolytes and administer replacement therapy as ordered  Outcome: Progressing  Flowsheets (Taken 6/7/2020 1257)  Absence of cardiac dysrhythmias or at baseline:   Continuous cardiac monitoring, monitor vital signs, obtain 12 lead EKG if indicated   Administer antiarrhythmic and heart rate control medications as ordered   Initiate emergency measures for life threatening arrhythmias   Monitor electrolytes and administer replacement therapy as ordered     
- Ordering, reviewing, and interpreting labs, testing, and imaging  - Independently obtaining a review of systems and performing a physical exam  - Reviewing consultant documentation/recommendations  - Counselling and educating patient and family regarding interpretation of aforementioned items and plan of care  - Documentation of encounter
- Ordering, reviewing, and interpreting labs, testing, and imaging  - Independently obtaining a review of systems and performing a physical exam  - Reviewing consultant documentation/recommendations  - Counselling and educating patient and family regarding interpretation of aforementioned items and plan of care  - Documentation of encounter

## 2024-04-12 NOTE — PHYSICAL THERAPY INITIAL EVALUATION ADULT - PERTINENT HX OF CURRENT PROBLEM, REHAB EVAL
74F w/ PMHx of hypercoagulable state 2/2 chronic afib, systolic CHF s/p AICD p/w several weeks of cough and SOB. She is admitted for suspected asthma exacerbation.

## 2024-04-12 NOTE — PHYSICAL THERAPY INITIAL EVALUATION ADULT - PRECAUTIONS/LIMITATIONS, REHAB EVAL
NEW INFORMATION ON THE REFILL LINE  In an effort to continue to provide you with superior service and quality, the decision has been made to inactivate the refill line at our clinic.    ? For Refill requests other than controlled substances  Please contact your pharmacy at least three (3) business days before your medication runs out.   The pharmacy will then send us a request for your refill.  Please allow 24-48 hours for the refill to be processed.       ? For Controlled substance Refill requests   Please call the clinic Monday through Friday, from 8:00am - 5:00pm to speak with a Patient .      DISPENSARY  You [facility] now  [facility] in the lobby of the [facility]. Monday - Friday 7:30am - 4:30pm.      LAB AND X-RAY HOURS FOR [address] [place] [address]  Monday - Thursday 7 am - 5:30 pm  Friday 7 am - 4:30 pm      TEST RESULTS  If your physician has ordered additional laboratory or radiology testing as part of your ongoing plan of care, notification of the test results will be communicated in a timely manner once reviewed by your provider.   -  If your results are normal, you will receive a letter in the mail.   -  If there are any irregularities, you will receive a phone call from our office within 5 - 7 business days.   -  If your results are critical and require more immediate intervention, you will be contacted promptly.       YouR OPINION MATTERS  You may be receiving a patient satisfaction survey in the mail. We would appreciate it if you could please take the time to complete, as your feedback is very important to us. We strive to make your experience exceptional and your comments help us with that goal. We look forward to hearing from you. Feedback is anonymous unless you choose otherwise.       fall precautions

## 2024-04-12 NOTE — DISCHARGE NOTE PROVIDER - NSDCCPCAREPLAN_GEN_ALL_CORE_FT
PRINCIPAL DISCHARGE DIAGNOSIS  Diagnosis: Acute asthma exacerbation  Assessment and Plan of Treatment: You came in for shortness of breath felt to be secondary to hyperreactive airway, possible asthma exacerbation. Required oxygen initially but you are oxygenating well with no need for oxygen. No evidence of pneumonia or viral infection on admission. You improved with steroids and nebulizations. Please continue to take prednisone as prescribed on dischare and continue to use albuterol inhaler as needed when out of the house and can use the albuterol machine (nebulization) every 6 hours as needed when at home. Please follow up with your PMD in 1-2 weeks after discharge.        SECONDARY DISCHARGE DIAGNOSES  Diagnosis: Systolic heart failure, chronic  Assessment and Plan of Treatment: You were seen by Cardiology during this admission, shortness of breath was not related to your heart condition. Please continue to take your medications as prescribed and f/up with Cardiology and EP. If appointments not made with Cardiology please ensure you are seen in next 1-2 weeks.     PRINCIPAL DISCHARGE DIAGNOSIS  Diagnosis: Acute asthma exacerbation  Assessment and Plan of Treatment: You came in for shortness of breath felt to be secondary to hyperreactive airway, possible asthma exacerbation. Required oxygen initially, but you are oxygenating well with no need for oxygen. You had no evidence of pneumonia or viral infection on admission. You improved with steroids and nebulizer treatments. Please continue to take prednisone as prescribed on discharge and continue to use albuterol inhaler as needed when out of the house. Please follow up with your PMD in 1-2 weeks after discharge.        SECONDARY DISCHARGE DIAGNOSES  Diagnosis: Systolic heart failure, chronic  Assessment and Plan of Treatment: You were seen by Cardiology during this admission, shortness of breath was not related to your heart condition. Please continue to take your medications as prescribed and follow up with Cardiology and EP in 1-2 weeks    Diagnosis: Atrial fibrillation  Assessment and Plan of Treatment: Continue Eliquis (blood thinner) for stroke prevention. Continue Metoprolol for heart rate control. Follow up with your Cardiologist for further monitoring in 1-2 weeks. Please call to arrange appointment.

## 2024-04-13 VITALS
TEMPERATURE: 98 F | HEART RATE: 89 BPM | SYSTOLIC BLOOD PRESSURE: 123 MMHG | OXYGEN SATURATION: 96 % | RESPIRATION RATE: 17 BRPM | DIASTOLIC BLOOD PRESSURE: 59 MMHG

## 2024-04-13 LAB
ANION GAP SERPL CALC-SCNC: 15 MMOL/L — HIGH (ref 7–14)
BASOPHILS # BLD AUTO: 0.08 K/UL — SIGNIFICANT CHANGE UP (ref 0–0.2)
BASOPHILS NFR BLD AUTO: 1 % — SIGNIFICANT CHANGE UP (ref 0–2)
BUN SERPL-MCNC: 17 MG/DL — SIGNIFICANT CHANGE UP (ref 7–23)
CALCIUM SERPL-MCNC: 9.4 MG/DL — SIGNIFICANT CHANGE UP (ref 8.4–10.5)
CHLORIDE SERPL-SCNC: 98 MMOL/L — SIGNIFICANT CHANGE UP (ref 98–107)
CO2 SERPL-SCNC: 21 MMOL/L — LOW (ref 22–31)
CREAT SERPL-MCNC: 0.52 MG/DL — SIGNIFICANT CHANGE UP (ref 0.5–1.3)
EGFR: 97 ML/MIN/1.73M2 — SIGNIFICANT CHANGE UP
EOSINOPHIL # BLD AUTO: 0.41 K/UL — SIGNIFICANT CHANGE UP (ref 0–0.5)
EOSINOPHIL NFR BLD AUTO: 4.9 % — SIGNIFICANT CHANGE UP (ref 0–6)
GLUCOSE SERPL-MCNC: 113 MG/DL — HIGH (ref 70–99)
HCT VFR BLD CALC: 38 % — SIGNIFICANT CHANGE UP (ref 34.5–45)
HGB BLD-MCNC: 12.5 G/DL — SIGNIFICANT CHANGE UP (ref 11.5–15.5)
IANC: 5.43 K/UL — SIGNIFICANT CHANGE UP (ref 1.8–7.4)
IMM GRANULOCYTES NFR BLD AUTO: 0.1 % — SIGNIFICANT CHANGE UP (ref 0–0.9)
LYMPHOCYTES # BLD AUTO: 1.59 K/UL — SIGNIFICANT CHANGE UP (ref 1–3.3)
LYMPHOCYTES # BLD AUTO: 19.1 % — SIGNIFICANT CHANGE UP (ref 13–44)
MAGNESIUM SERPL-MCNC: 2 MG/DL — SIGNIFICANT CHANGE UP (ref 1.6–2.6)
MCHC RBC-ENTMCNC: 31.4 PG — SIGNIFICANT CHANGE UP (ref 27–34)
MCHC RBC-ENTMCNC: 32.9 GM/DL — SIGNIFICANT CHANGE UP (ref 32–36)
MCV RBC AUTO: 95.5 FL — SIGNIFICANT CHANGE UP (ref 80–100)
MONOCYTES # BLD AUTO: 0.81 K/UL — SIGNIFICANT CHANGE UP (ref 0–0.9)
MONOCYTES NFR BLD AUTO: 9.7 % — SIGNIFICANT CHANGE UP (ref 2–14)
NEUTROPHILS # BLD AUTO: 5.43 K/UL — SIGNIFICANT CHANGE UP (ref 1.8–7.4)
NEUTROPHILS NFR BLD AUTO: 65.2 % — SIGNIFICANT CHANGE UP (ref 43–77)
NRBC # BLD: 0 /100 WBCS — SIGNIFICANT CHANGE UP (ref 0–0)
NRBC # FLD: 0 K/UL — SIGNIFICANT CHANGE UP (ref 0–0)
PHOSPHATE SERPL-MCNC: 3 MG/DL — SIGNIFICANT CHANGE UP (ref 2.5–4.5)
PLATELET # BLD AUTO: 237 K/UL — SIGNIFICANT CHANGE UP (ref 150–400)
POTASSIUM SERPL-MCNC: 3.9 MMOL/L — SIGNIFICANT CHANGE UP (ref 3.5–5.3)
POTASSIUM SERPL-SCNC: 3.9 MMOL/L — SIGNIFICANT CHANGE UP (ref 3.5–5.3)
RBC # BLD: 3.98 M/UL — SIGNIFICANT CHANGE UP (ref 3.8–5.2)
RBC # FLD: 13.7 % — SIGNIFICANT CHANGE UP (ref 10.3–14.5)
SODIUM SERPL-SCNC: 134 MMOL/L — LOW (ref 135–145)
WBC # BLD: 8.33 K/UL — SIGNIFICANT CHANGE UP (ref 3.8–10.5)
WBC # FLD AUTO: 8.33 K/UL — SIGNIFICANT CHANGE UP (ref 3.8–10.5)

## 2024-04-13 PROCEDURE — 99239 HOSP IP/OBS DSCHRG MGMT >30: CPT

## 2024-04-13 RX ADMIN — Medication 100 MILLIGRAM(S): at 14:44

## 2024-04-13 RX ADMIN — Medication 1200 MILLIGRAM(S): at 05:11

## 2024-04-13 RX ADMIN — Medication 25 MILLIGRAM(S): at 05:11

## 2024-04-13 RX ADMIN — Medication 3 MILLILITER(S): at 09:13

## 2024-04-13 RX ADMIN — SPIRONOLACTONE 25 MILLIGRAM(S): 25 TABLET, FILM COATED ORAL at 05:14

## 2024-04-13 RX ADMIN — APIXABAN 5 MILLIGRAM(S): 2.5 TABLET, FILM COATED ORAL at 05:10

## 2024-04-13 RX ADMIN — Medication 40 MILLIGRAM(S): at 05:14

## 2024-04-13 RX ADMIN — DAPAGLIFLOZIN 10 MILLIGRAM(S): 10 TABLET, FILM COATED ORAL at 14:44

## 2024-04-13 RX ADMIN — Medication 3 MILLILITER(S): at 03:44

## 2024-04-13 RX ADMIN — LOSARTAN POTASSIUM 25 MILLIGRAM(S): 100 TABLET, FILM COATED ORAL at 05:14

## 2024-04-13 RX ADMIN — Medication 100 MILLIGRAM(S): at 05:10

## 2024-04-13 RX ADMIN — CHLORHEXIDINE GLUCONATE 1 APPLICATION(S): 213 SOLUTION TOPICAL at 14:39

## 2024-04-13 NOTE — PROGRESS NOTE ADULT - PROBLEM SELECTOR PLAN 5
On Eliquis  PT evaluation    Discussed with daughter at bedside today 4/11.
On Eliquis  PT eval --> No skilled needs    Discussed with daughter Nelda at length at bedside today 4/12  Anticipate DC Home tomorrow.
On Eliquis  PT eval --> No skilled needs    Discussed with daughter Nelda at length at bedside today 4/13  DC today 4/13

## 2024-04-13 NOTE — PROGRESS NOTE ADULT - SUBJECTIVE AND OBJECTIVE BOX
Patient is a 74y old  Female who presents with a chief complaint of     INTERVAL HPI/OVERNIGHT EVENTS:  Seen by me this morning, daughter Nelda at bedside, feeling much better, like 70% close to baseline, slept better last night, coughing has improved same as SOB. Tolerating PO.    Review of Systems: 12 point review of systems otherwise negative    MEDICATIONS  (STANDING):  albuterol/ipratropium for Nebulization 3 milliLiter(s) Nebulizer every 6 hours  apixaban 5 milliGRAM(s) Oral every 12 hours  benzonatate 100 milliGRAM(s) Oral every 8 hours  chlorhexidine 2% Cloths 1 Application(s) Topical daily  dapagliflozin 10 milliGRAM(s) Oral every 24 hours  guaiFENesin ER 1200 milliGRAM(s) Oral every 12 hours  influenza  Vaccine (HIGH DOSE) 0.7 milliLiter(s) IntraMuscular once  losartan 25 milliGRAM(s) Oral daily  melatonin 3 milliGRAM(s) Oral at bedtime  metoprolol succinate ER 25 milliGRAM(s) Oral daily  spironolactone 25 milliGRAM(s) Oral daily    MEDICATIONS  (PRN):  acetaminophen     Tablet .. 650 milliGRAM(s) Oral every 6 hours PRN Temp greater or equal to 38C (100.4F), Mild Pain (1 - 3)  ondansetron Injectable 4 milliGRAM(s) IV Push every 8 hours PRN Nausea and/or Vomiting      Allergies    No Known Allergies    Intolerances          Vital Signs Last 24 Hrs  T(C): 36.8 (12 Apr 2024 11:33), Max: 37.1 (11 Apr 2024 20:10)  T(F): 98.2 (12 Apr 2024 11:33), Max: 98.8 (11 Apr 2024 20:10)  HR: 86 (12 Apr 2024 11:33) (79 - 86)  BP: 108/48 (12 Apr 2024 11:33) (108/48 - 112/54)  BP(mean): --  RR: 16 (12 Apr 2024 11:33) (16 - 18)  SpO2: 95% (12 Apr 2024 11:33) (95% - 99%)    Parameters below as of 12 Apr 2024 08:49  Patient On (Oxygen Delivery Method): room air      CAPILLARY BLOOD GLUCOSE            Physical Exam:    Daily Height in cm: 160.02 (11 Apr 2024 20:10)    Daily   General:  Well appearing, NAD, not cachetic, on RA  HEENT:  Nonicteric, PERRLA  CV:  RRR, no murmur, no JVD  Lungs: Slightly decreased AE B/L, no wheezing or rhonchi heard  Abdomen:  Soft, non-tender, no distended, positive BS, no hepatosplenomegaly  Extremities:  2+ pulses, no c/c, no edema  Skin:  Warm and dry, no rashes  :  No mann  Neuro:  AAOx3, non-focal, CN II-XII grossly intact  No Restraints    LABS:                        11.8   9.06  )-----------( 237      ( 12 Apr 2024 07:23 )             35.4     04-12    134<L>  |  101  |  25<H>  ----------------------------<  106<H>  3.9   |  19<L>  |  0.76    Ca    9.2      12 Apr 2024 07:23  Phos  3.0     04-12  Mg     2.10     04-12        Urinalysis Basic - ( 12 Apr 2024 07:23 )    Color: x / Appearance: x / SG: x / pH: x  Gluc: 106 mg/dL / Ketone: x  / Bili: x / Urobili: x   Blood: x / Protein: x / Nitrite: x   Leuk Esterase: x / RBC: x / WBC x   Sq Epi: x / Non Sq Epi: x / Bacteria: x          RADIOLOGY & ADDITIONAL TESTS:  Reviewed by me    
Contact Information:  Cyndi Villanueva II, MD, MPH  Internal Medicine    [facility] [id number]    Patient is a 74y old  Female who presents with a chief complaint of     OVERNIGHT EVENTS/INTERVAL/SUBJECTIVE: Patient evaluated at bedside, only endorsing a residual cough but otherwise no other complaints, including SOB, CP, ABD pain, numbness, tingling, lightheadedness, dizziness, N/V.    CONSTITUTIONAL: No weakness. No fatigue. No fever.  HEAD: No head trauma.   EYES: No vision changes.  ENT: No hearing changes or tinnitus. No ear pain. No changes in smell. No nasal congestion or discharge. No sore throat. No voice hoarseness.   NECK: No neck pain or stiffness. No lumps.  RESPIRATORY: +Cough. No SOB. No wheezing. No hemoptysis.   CARDIOVASCULAR: No chest pain. No palpitations.   GASTROINTESTINAL: No dysphagia. No ABD pain. No distension. No constipation. No diarrhea. No pain with defecation. No hematemesis. No hematochezia or melena.  BACK: No back pain.  GENITOURINARY: No dysuria. No frequency or urgency. No hesitancy. No incontinence. No urinary retention. No suprapubic pain. No hematuria.  EXTREMITY: No swelling.  MUSCULOSKELETAL: No joint pain or swelling. No fractures. No stiffness.    SKIN: No rashes. No itching. No skin, hair, or nail changes.  NEUROLOGICAL: No weakness or paralysis. No lightheadedness or dizziness. No HA. No numbness or tingling.   PSYCHIATRIC: No depression.       OBJECTIVE:  Vital Signs Last 24 Hrs  T(C): 36.7 (13 Apr 2024 11:40), Max: 37.1 (12 Apr 2024 20:36)  T(F): 98 (13 Apr 2024 11:40), Max: 98.7 (12 Apr 2024 20:36)  HR: 81 (13 Apr 2024 11:40) (81 - 84)  BP: 99/56 (13 Apr 2024 11:40) (99/56 - 127/62)  BP(mean): --  RR: 16 (13 Apr 2024 11:40) (16 - 17)  SpO2: 95% (13 Apr 2024 11:40) (95% - 97%)    Parameters below as of 13 Apr 2024 11:40  Patient On (Oxygen Delivery Method): room air      I&O's Summary      MEDICATIONS  (STANDING):  albuterol/ipratropium for Nebulization 3 milliLiter(s) Nebulizer every 6 hours  apixaban 5 milliGRAM(s) Oral every 12 hours  benzonatate 100 milliGRAM(s) Oral every 8 hours  chlorhexidine 2% Cloths 1 Application(s) Topical daily  dapagliflozin 10 milliGRAM(s) Oral every 24 hours  guaiFENesin ER 1200 milliGRAM(s) Oral every 12 hours  influenza  Vaccine (HIGH DOSE) 0.7 milliLiter(s) IntraMuscular once  losartan 25 milliGRAM(s) Oral daily  melatonin 3 milliGRAM(s) Oral at bedtime  metoprolol succinate ER 25 milliGRAM(s) Oral daily  predniSONE   Tablet 40 milliGRAM(s) Oral daily  spironolactone 25 milliGRAM(s) Oral daily    MEDICATIONS  (PRN):  acetaminophen     Tablet .. 650 milliGRAM(s) Oral every 6 hours PRN Temp greater or equal to 38C (100.4F), Mild Pain (1 - 3)  ondansetron Injectable 4 milliGRAM(s) IV Push every 8 hours PRN Nausea and/or Vomiting    Allergies    No Known Allergies    Intolerances        CONSTITUTIONAL: No acute distress. Awake and alert.  RESPIRATORY: CTAB. No wheezes, rales, or rhonchi. No accessory muscle use. No apparent respiratory distress.  CARDIOVASCULAR: +S1/S2. No audible S3/S4. Regular rate and rhythm. No murmurs, rubs, or gallops. No LE swelling or edema.  GASTROINTESTINAL: Soft, nontender, nondistended. +BS. No rebound or guarding.   MUSCULOSKELETAL: Spontaneous movement in all extremities.  NEUROLOGICAL: CN 2-12 grossly intact. No focal deficits. Sensation intact x 4EXT.   PSYCHIATRIC: Appropriate affect. A&Ox3 (oriented to person, place, and time).                              12.5   8.33  )-----------( 237      ( 13 Apr 2024 06:19 )             38.0       04-13    134<L>  |  98  |  17  ----------------------------<  113<H>  3.9   |  21<L>  |  0.52    Ca    9.4      13 Apr 2024 06:19  Phos  3.0     04-13  Mg     2.00     04-13      CAPILLARY BLOOD GLUCOSE              Urinalysis Basic - ( 13 Apr 2024 06:19 )    Color: x / Appearance: x / SG: x / pH: x  Gluc: 113 mg/dL / Ketone: x  / Bili: x / Urobili: x   Blood: x / Protein: x / Nitrite: x   Leuk Esterase: x / RBC: x / WBC x   Sq Epi: x / Non Sq Epi: x / Bacteria: x            RADIOLOGY AND ADDITIONAL TESTS:    CONSULTANT NOTES REVIEWED:    CARE DISCUSSED WITH THE FOLLOWING CONSULTANTS/PROVIDERS:
  Patient is a 74y old  Female who presents with a chief complaint of     INTERVAL HPI/OVERNIGHT EVENTS:  Seen by me this morning, daughter at bedside, spoke to them in [place], feeling a bit better, feels that the steroids are helping her, +cough, tolerating PO, minimal SOB, no chest pain. Off BIPAP.      Review of Systems: 12 point review of systems otherwise negative    MEDICATIONS  (STANDING):  albuterol    90 MICROgram(s) HFA Inhaler 2 Puff(s) Inhalation daily  albuterol/ipratropium for Nebulization 3 milliLiter(s) Nebulizer every 6 hours  apixaban 5 milliGRAM(s) Oral every 12 hours  benzonatate 100 milliGRAM(s) Oral every 8 hours  chlorhexidine 2% Cloths 1 Application(s) Topical daily  dapagliflozin 10 milliGRAM(s) Oral every 24 hours  guaiFENesin ER 1200 milliGRAM(s) Oral every 12 hours  influenza  Vaccine (HIGH DOSE) 0.7 milliLiter(s) IntraMuscular once  losartan 25 milliGRAM(s) Oral daily  methylPREDNISolone sodium succinate Injectable 40 milliGRAM(s) IV Push daily  metoprolol succinate ER 25 milliGRAM(s) Oral daily  spironolactone 25 milliGRAM(s) Oral daily    MEDICATIONS  (PRN):  acetaminophen     Tablet .. 650 milliGRAM(s) Oral every 6 hours PRN Temp greater or equal to 38C (100.4F), Mild Pain (1 - 3)  melatonin 3 milliGRAM(s) Oral at bedtime PRN Insomnia  ondansetron Injectable 4 milliGRAM(s) IV Push every 8 hours PRN Nausea and/or Vomiting      Allergies    No Known Allergies    Intolerances          Vital Signs Last 24 Hrs  T(C): 36.7 (11 Apr 2024 17:33), Max: 36.9 (10 Apr 2024 20:35)  T(F): 98.1 (11 Apr 2024 17:33), Max: 98.5 (11 Apr 2024 11:36)  HR: 85 (11 Apr 2024 17:33) (76 - 89)  BP: 109/55 (11 Apr 2024 17:33) (104/56 - 109/55)  BP(mean): --  RR: 18 (11 Apr 2024 17:33) (17 - 18)  SpO2: 97% (11 Apr 2024 17:33) (95% - 97%)    Parameters below as of 11 Apr 2024 17:33  Patient On (Oxygen Delivery Method): room air      CAPILLARY BLOOD GLUCOSE            Physical Exam:    Daily     Daily   General:  Well appearing, NAD, thin, on RA  HEENT:  Nonicteric, PERRLA  CV: S1S2 Ok, no murmur, no JVD  Lungs:  Occasional rhonchi B/L  Abdomen:  Soft, non-tender, no distended, positive BS, no hepatosplenomegaly  Extremities:  2+ pulses, no c/c, no edema  Skin:  Warm and dry, no rashes  :  No mann  Neuro:  AAOx3, non-focal, CN II-XII grossly intact  No Restraints    LABS:                        11.9   7.08  )-----------( 227      ( 11 Apr 2024 06:00 )             36.2     04-11    132<L>  |  97<L>  |  16  ----------------------------<  124<H>  3.9   |  21<L>  |  0.60    Ca    9.4      11 Apr 2024 06:00  Phos  3.6     04-11  Mg     2.10     04-11    TPro  6.6  /  Alb  3.9  /  TBili  0.5  /  DBili  x   /  AST  26  /  ALT  20  /  AlkPhos  67  04-10    PT/INR - ( 10 Apr 2024 08:53 )   PT: 16.0 sec;   INR: 1.43 ratio         PTT - ( 10 Apr 2024 08:53 )  PTT:32.9 sec  Urinalysis Basic - ( 11 Apr 2024 06:00 )    Color: x / Appearance: x / SG: x / pH: x  Gluc: 124 mg/dL / Ketone: x  / Bili: x / Urobili: x   Blood: x / Protein: x / Nitrite: x   Leuk Esterase: x / RBC: x / WBC x   Sq Epi: x / Non Sq Epi: x / Bacteria: x          RADIOLOGY & ADDITIONAL TESTS:  Reviewed by me

## 2024-04-13 NOTE — PROGRESS NOTE ADULT - PROBLEM SELECTOR PLAN 4
Clinically euvolemic, s/p AICD  - TTE 7/2023 with EF 15&  - c/w metoprolol, Farxiga, spironolactone, losartan  - Cards rec apprec
- Clinically euvolemic, s/p AICD  - TTE 7/2023 with EF 15%  - C/w metoprolol, Farxiga, spironolactone, losartan  - Cards rec appreciated - outpatient Cards follow-up
Clinically euvolemic, s/p AICD  - TTE 7/2023 with EF 15&  - c/w metoprolol, Farxiga, spironolactone, losartan  - Cards rec apprec

## 2024-04-13 NOTE — PROGRESS NOTE ADULT - ASSESSMENT
74W PMHx hypercoagulable state 2/2 chronic atrial fibrillation, systolic CHF s/p AICD presents with several weeks of cough and shortness of breath. She is admitted for suspected asthma exacerbation, clinically improved, tentative for discharge 4/15.
74W PMH hypercoagulable state 2/2 chronic atrial fibrillation, systolic CHF s/p AICD presents with several weeks of cough and shortness of breath. She is admitted for suspected asthma exacerbation.
74W PMH hypercoagulable state 2/2 chronic atrial fibrillation, systolic CHF s/p AICD presents with several weeks of cough and shortness of breath. She is admitted for suspected asthma exacerbation.

## 2024-04-13 NOTE — PROGRESS NOTE ADULT - PROBLEM/PLAN-2
DISPLAY PLAN FREE TEXT
Enrique Ruby on behalf of Dr. Talley

## 2024-04-13 NOTE — PROGRESS NOTE ADULT - PROBLEM SELECTOR PLAN 2
Noted on differential on admission  - Repeat CBC with differential shows resolution of eosinophilia, ?d/t steroids  - F/up Ig E for completion
- Noted on differential on admission  - Repeat CBC with differential shows resolution of eosinophilia, ?d/t steroids  - F/up IgE for completion
Noted on differential on admission  - Repeat CBC with differential this AM shows resolution of eosinophilia, ?d/t steroids  - F/up Ig E for completion

## 2024-04-13 NOTE — PROGRESS NOTE ADULT - PROBLEM SELECTOR PLAN 3
c/w home dose Eliquis, metoprolol
- c/w home dose Eliquis, metoprolol
c/w home dose Eliquis, metoprolol

## 2024-04-13 NOTE — PROGRESS NOTE ADULT - PROBLEM SELECTOR PLAN 1
- Presents with dyspnea, wheezing; initially requiring BiPAP-acute hypoxemic respiratory failure-  Appreciate cardiology and pulmonary recommendations; less likely ADHF and more likely asthma exacerbation  - RVP negative for COVID/Flu/RSV  - S/p IV methylprednisolone 40mg for 4 days, transitioned to prednisone 40mg for 5 extra days  - C/w standing DuoNebs  - Initial eosinophilia has now resolved, ?d/t steroids  - Tessalon/mucinex ATC  - Off BIPAP, saturating well on RA, AHRF has now resolved  - Monitor on  given recent BiPAP requirement
Presents with dyspnea, wheezing; initially requiring BiPAP-acute hypoxemic respiratory failure-  Appreciate cardiology and pulmonary recommendations; less likely ADHF and more likely asthma exacerbation  - RVP negative for COVID/Flu/RSV  - S/p IV methylprednisolone 40mg for 4 days, will start prednisone 40mg tomorrow for 5 extra days  - C/w standing DuoNebs  - Initial eosinophilia has now resolved, ?d/t steroids  - Tessalon/mucinex ATC  - Off BIPAP, saturating well on RA, AHRF has now resolved  - Monitor on  given recent BiPAP requirement
Presents with dyspnea, wheezing; initially requiring BiPAP  Appreciate cardiology and pulmonary recommendations; less likely ADHF and more likely asthma exacerbation  - RVP negative for COVID/Flu/RSV  - c/w IV methylprednisolone, standing DuoNebs  - Initial eosinophilia has now resolved, ?d/t steroids  - Tessalon/mucinex ATC  - Off BIPAP, saturating well on RA  - Monitor on  given recent BiPAP requirement  - F/up further Pulmonary recs

## 2024-04-13 NOTE — PROGRESS NOTE ADULT - PROBLEM SELECTOR PROBLEM 3
Hypercoagulable state due to chronic atrial fibrillation

## 2024-04-14 LAB — IGE SERPL-ACNC: 310 KU/L — HIGH

## 2024-04-15 ENCOUNTER — NON-APPOINTMENT (OUTPATIENT)
Age: 74
End: 2024-04-15

## 2024-04-26 ENCOUNTER — NON-APPOINTMENT (OUTPATIENT)
Age: 74
End: 2024-04-26

## 2024-04-27 ENCOUNTER — EMERGENCY (EMERGENCY)
Facility: HOSPITAL | Age: 74
LOS: 1 days | Discharge: HOME | End: 2024-04-27
Attending: STUDENT IN AN ORGANIZED HEALTH CARE EDUCATION/TRAINING PROGRAM | Admitting: STUDENT IN AN ORGANIZED HEALTH CARE EDUCATION/TRAINING PROGRAM
Payer: MEDICAID

## 2024-04-27 VITALS
SYSTOLIC BLOOD PRESSURE: 110 MMHG | HEIGHT: 63 IN | RESPIRATION RATE: 18 BRPM | TEMPERATURE: 98 F | OXYGEN SATURATION: 97 % | DIASTOLIC BLOOD PRESSURE: 69 MMHG | HEART RATE: 82 BPM

## 2024-04-27 LAB
ALBUMIN SERPL ELPH-MCNC: 4.2 G/DL — SIGNIFICANT CHANGE UP (ref 3.3–5)
ALP SERPL-CCNC: 70 U/L — SIGNIFICANT CHANGE UP (ref 40–120)
ALT FLD-CCNC: 18 U/L — SIGNIFICANT CHANGE UP (ref 4–33)
ANION GAP SERPL CALC-SCNC: 11 MMOL/L — SIGNIFICANT CHANGE UP (ref 7–14)
APTT BLD: 30.9 SEC — SIGNIFICANT CHANGE UP (ref 24.5–35.6)
AST SERPL-CCNC: 22 U/L — SIGNIFICANT CHANGE UP (ref 4–32)
BASOPHILS # BLD AUTO: 0.08 K/UL — SIGNIFICANT CHANGE UP (ref 0–0.2)
BASOPHILS NFR BLD AUTO: 0.6 % — SIGNIFICANT CHANGE UP (ref 0–2)
BILIRUB SERPL-MCNC: 0.4 MG/DL — SIGNIFICANT CHANGE UP (ref 0.2–1.2)
BUN SERPL-MCNC: 25 MG/DL — HIGH (ref 7–23)
CALCIUM SERPL-MCNC: 9.2 MG/DL — SIGNIFICANT CHANGE UP (ref 8.4–10.5)
CHLORIDE SERPL-SCNC: 93 MMOL/L — LOW (ref 98–107)
CK MB CFR SERPL CALC: 3 NG/ML — SIGNIFICANT CHANGE UP
CO2 SERPL-SCNC: 25 MMOL/L — SIGNIFICANT CHANGE UP (ref 22–31)
CREAT SERPL-MCNC: 0.55 MG/DL — SIGNIFICANT CHANGE UP (ref 0.5–1.3)
EGFR: 96 ML/MIN/1.73M2 — SIGNIFICANT CHANGE UP
EOSINOPHIL # BLD AUTO: 0.67 K/UL — HIGH (ref 0–0.5)
EOSINOPHIL NFR BLD AUTO: 4.6 % — SIGNIFICANT CHANGE UP (ref 0–6)
GLUCOSE SERPL-MCNC: 178 MG/DL — HIGH (ref 70–99)
HCT VFR BLD CALC: 39.8 % — SIGNIFICANT CHANGE UP (ref 34.5–45)
HGB BLD-MCNC: 13 G/DL — SIGNIFICANT CHANGE UP (ref 11.5–15.5)
IANC: 11.81 K/UL — HIGH (ref 1.8–7.4)
IMM GRANULOCYTES NFR BLD AUTO: 0.5 % — SIGNIFICANT CHANGE UP (ref 0–0.9)
INR BLD: 1.07 RATIO — SIGNIFICANT CHANGE UP (ref 0.85–1.18)
LYMPHOCYTES # BLD AUTO: 1.17 K/UL — SIGNIFICANT CHANGE UP (ref 1–3.3)
LYMPHOCYTES # BLD AUTO: 8.1 % — LOW (ref 13–44)
MCHC RBC-ENTMCNC: 31.3 PG — SIGNIFICANT CHANGE UP (ref 27–34)
MCHC RBC-ENTMCNC: 32.7 GM/DL — SIGNIFICANT CHANGE UP (ref 32–36)
MCV RBC AUTO: 95.7 FL — SIGNIFICANT CHANGE UP (ref 80–100)
MONOCYTES # BLD AUTO: 0.71 K/UL — SIGNIFICANT CHANGE UP (ref 0–0.9)
MONOCYTES NFR BLD AUTO: 4.9 % — SIGNIFICANT CHANGE UP (ref 2–14)
NEUTROPHILS # BLD AUTO: 11.81 K/UL — HIGH (ref 1.8–7.4)
NEUTROPHILS NFR BLD AUTO: 81.3 % — HIGH (ref 43–77)
NRBC # BLD: 0 /100 WBCS — SIGNIFICANT CHANGE UP (ref 0–0)
NRBC # FLD: 0 K/UL — SIGNIFICANT CHANGE UP (ref 0–0)
PLATELET # BLD AUTO: 294 K/UL — SIGNIFICANT CHANGE UP (ref 150–400)
POTASSIUM SERPL-MCNC: 4.1 MMOL/L — SIGNIFICANT CHANGE UP (ref 3.5–5.3)
POTASSIUM SERPL-SCNC: 4.1 MMOL/L — SIGNIFICANT CHANGE UP (ref 3.5–5.3)
PROT SERPL-MCNC: 6.8 G/DL — SIGNIFICANT CHANGE UP (ref 6–8.3)
PROTHROM AB SERPL-ACNC: 12 SEC — SIGNIFICANT CHANGE UP (ref 9.5–13)
RBC # BLD: 4.16 M/UL — SIGNIFICANT CHANGE UP (ref 3.8–5.2)
RBC # FLD: 13.2 % — SIGNIFICANT CHANGE UP (ref 10.3–14.5)
SODIUM SERPL-SCNC: 129 MMOL/L — LOW (ref 135–145)
TROPONIN T, HIGH SENSITIVITY RESULT: 20 NG/L — SIGNIFICANT CHANGE UP
TROPONIN T, HIGH SENSITIVITY RESULT: 24 NG/L — SIGNIFICANT CHANGE UP
WBC # BLD: 14.51 K/UL — HIGH (ref 3.8–10.5)
WBC # FLD AUTO: 14.51 K/UL — HIGH (ref 3.8–10.5)

## 2024-04-27 PROCEDURE — 71045 X-RAY EXAM CHEST 1 VIEW: CPT | Mod: 26

## 2024-04-27 PROCEDURE — 93010 ELECTROCARDIOGRAM REPORT: CPT

## 2024-04-27 PROCEDURE — 71250 CT THORAX DX C-: CPT | Mod: 26,MC

## 2024-04-27 PROCEDURE — 99285 EMERGENCY DEPT VISIT HI MDM: CPT

## 2024-04-27 RX ORDER — ACETAMINOPHEN 500 MG
1000 TABLET ORAL ONCE
Refills: 0 | Status: COMPLETED | OUTPATIENT
Start: 2024-04-27 | End: 2024-04-27

## 2024-04-27 RX ADMIN — Medication 400 MILLIGRAM(S): at 19:52

## 2024-04-27 NOTE — ED ADULT NURSE NOTE - NSFALLASSESSNEED_ED_ALL_ED
75-year-old female with pmhx myasthenia gravis HTN, HLD, anemia hyperparathyroidism presenting for eval after pre-op labwork showed hemoglobin of 7.1. Patient has required transfusions in the past, and gotten iron infusions. Blood work in January showed hgb 10. Patient denies blood in stool or urine, denies dark stools, no abdominal pain, nausea, vomiting. Patient states she hasn't been eating good foods. 75-year-old female with pmhx myasthenia gravis, schwannoma, HTN, HLD, anemia hyperparathyroidism presenting for eval after pre-op labwork showed hemoglobin of 7.1. Patient has required transfusions in the past, and gotten iron infusions. Blood work in January showed hgb 10. Patient denies blood in stool or urine, denies dark stools, no abdominal pain, nausea, vomiting. Patient states she hasn't been eating good foods. no

## 2024-04-27 NOTE — ED PROVIDER NOTE - CARE PLAN
1 Principal Discharge DX:	Chest wall tenderness  Secondary Diagnosis:	Accident due to mechanical fall without injury

## 2024-04-27 NOTE — ED PROVIDER NOTE - OBJECTIVE STATEMENT
Patient is a 74-year-old female with past medical history of AICD, A-fib, hypertension, hyperlipidemia who presents emergency department complaining of mechanical fall in the shower.  Patient states that she was taking a shower and a family member was assisting her when she slipped and fell on to the left side of her chest.  Patient denies hitting her head or any loss of consciousness.  Patient states she is on a blood thinner, Eliquis.  Patient denies any chest pain, shortness of breath, palpitations prior to or after the fall.  Denies any AICD firing.  Patient is complaining of left-sided chest pain with deep inspiration.

## 2024-04-27 NOTE — ED PROVIDER NOTE - PHYSICAL EXAMINATION
Physical Exam:  Gen: NAD, AOx3, non-toxic appearing, able to ambulate without assistance  Head: NCAT  HEENT: EOMI, PEERLA, normal conjunctiva, tongue midline, oral mucosa moist  Lung: CTAB, no respiratory distress, no wheezes/rhonchi/rales B/L, speaking in full sentences  CV: RRR,   Abd: soft, NT, ND, no guarding, no rigidity, no rebound tenderness, no CVA tenderness   MSK: no visible deformities, ROM normal in UE/LE, no back pain, left sided chest wall tenderness   Neuro: No focal sensory or motor deficits  Skin: Warm, well perfused, no rash, no leg swelling

## 2024-04-27 NOTE — ED ADULT TRIAGE NOTE - CHIEF COMPLAINT QUOTE
c/o left sided rib and shoulder pain s/p slip and fall today in the shower, denies head injury, LOC, phx of peacemaker, reports pacemaker did not fire prior to fall episode, endorses worsening pain with deep respirations.

## 2024-04-27 NOTE — ED PROVIDER NOTE - PATIENT PORTAL LINK FT
You can access the FollowMyHealth Patient Portal offered by [facility] by registering at the following website: [email or web address]. By joining [facility]’s FollowMyHealth portal, you will also be able to view your health information using other applications (apps) compatible with our system.

## 2024-04-27 NOTE — ED ADULT NURSE NOTE - NSFALLHARMRISKINTERV_ED_ALL_ED
Communicate risk of Fall with Harm to all staff, patient, and family/Provide visual cue: red socks, yellow wristband, yellow gown, etc/Reinforce activity limits and safety measures with patient and family/Bed in lowest position, wheels locked, appropriate side rails in place/Call bell, personal items and telephone in reach/Instruct patient to call for assistance before getting out of bed/chair/stretcher/Non-slip footwear applied when patient is off stretcher/[place] to call system/Physically safe environment - no spills, clutter or unnecessary equipment/Purposeful Proactive Rounding/Room/bathroom lighting operational, light cord in reach/Unable to comprehend

## 2024-04-27 NOTE — ED ADULT NURSE NOTE - OBJECTIVE STATEMENT
A&OX4, awake, and alert, ambulatory, [place] speaking, h/o pacemaker. Patient is coming to ED in regards to a fall. Patient states she was in the shower then had a mechanical fall and fell on her left side, endorses worsen pain with movement and deep breathes. Patient denies weakness, blurred vision, no facial droop or slurred speech noted. awaiting orders, will continue to monitor. A&OX4, awake, and alert, ambulatory, [place] speaking, h/o pacemaker. Patient is coming to ED in regards to a fall. Patient states she was in the shower then had a mechanical fall and fell on her left side, endorses worsen pain with movement and deep breathes. 20G IV placed to RAC, NSR, RA. Patient denies weakness, blurred vision, no facial droop or slurred speech noted. will continue to monitor.

## 2024-04-27 NOTE — ED PROVIDER NOTE - ATTENDING CONTRIBUTION TO CARE
73 yo F hx AF, HTN, HLD, CHF s/p AICD, presenting for evaluation s/p mechanical fall. Pt reports slip and fall in the shower, falling onto her L side. Denies hitting her head, no LOC, ambulatory after fall. No other areas of pain. No sob, palpitations, dizziness. No focal weakness, numbness/tingling.    VITALS: reviewed  GEN: NAD, A & O x 4  HEAD/EYES: NCAT, EOMI, anicteric sclerae, no conjunctival pallor  ENT: mucus membranes moist, oropharynx WNL, trachea midline  RESP: lungs CTA with equal breath sounds bilaterally, L cw ttp  CV: heart with reg rhythm S1, S2,distal pulses intact and symmetric bilaterally  ABDOMEN: normoactive bowel sounds, soft, nondistended, nontender, no palpable masses  : no CVAT  MSK: extremities atraumatic and nontender, no edema, no asymmetry. the back is without midline or lateral tenderness, there is no spinal deformity or stepoff and the back is ranged painlessly. the neck has no midline tenderness, deformity, or stepoff, and is ranged painlessly. FROM all extremities.   SKIN: warm, dry, no rash, no bruising, no cyanosis. color appropriate for ethnicity  NEURO: alert, mentating appropriately, no facial asymmetry. gross sensation, motor, coordination are intact  PSYCH: Affect appropriate    Plan for CT chest r/o rib fx, labs, meds and reassess, likely dc

## 2024-04-27 NOTE — ED PROVIDER NOTE - NSTIMEPROVIDERCAREINITIATE_GEN_ER
27-Apr-2024 18:40
Educated pt on post-op nutrition and general healthy nutrition therapy. Discussed emphasizing protein foods, high fiber carbohydrates, and sources of unsaturated fat while being mindful of sodium and sources of saturated fat. Discussed implementing consistent meal times throughout the day as pt expressed concern for most of his intake being late at night. Pt receptive, aware RD remains available for questions/concern./(2) meets goals/outcomes/verbalization

## 2024-04-28 VITALS
RESPIRATION RATE: 18 BRPM | SYSTOLIC BLOOD PRESSURE: 103 MMHG | DIASTOLIC BLOOD PRESSURE: 54 MMHG | OXYGEN SATURATION: 100 % | HEART RATE: 62 BPM | TEMPERATURE: 98 F

## 2024-04-28 RX ORDER — KETOROLAC TROMETHAMINE 30 MG/ML
15 SYRINGE (ML) INJECTION ONCE
Refills: 0 | Status: DISCONTINUED | OUTPATIENT
Start: 2024-04-28 | End: 2024-04-28

## 2024-04-28 RX ADMIN — Medication 15 MILLIGRAM(S): at 01:08

## 2024-04-28 NOTE — ED ADULT NURSE REASSESSMENT NOTE - NS ED NURSE REASSESS COMMENT FT1
Break covering RN: patient received, appears to be resting comfortably in bed, no complaints noted at this time. Breathing even and unlabored, pallor/diaphoresis not noted. NSR, RA will continue to monitor.

## 2024-05-23 ENCOUNTER — APPOINTMENT (OUTPATIENT)
Dept: CARDIOLOGY | Facility: CLINIC | Age: 74
End: 2024-05-23

## 2024-08-21 ENCOUNTER — APPOINTMENT (OUTPATIENT)
Dept: PULMONOLOGY | Facility: CLINIC | Age: 74
End: 2024-08-21
Payer: MEDICAID

## 2024-08-21 VITALS
HEIGHT: 60 IN | DIASTOLIC BLOOD PRESSURE: 68 MMHG | WEIGHT: 142 LBS | BODY MASS INDEX: 27.88 KG/M2 | HEART RATE: 80 BPM | SYSTOLIC BLOOD PRESSURE: 106 MMHG | OXYGEN SATURATION: 99 %

## 2024-08-21 DIAGNOSIS — J45.901 UNSPECIFIED ASTHMA WITH (ACUTE) EXACERBATION: ICD-10-CM

## 2024-08-21 DIAGNOSIS — I48.20 CHRONIC ATRIAL FIBRILLATION, UNSP: ICD-10-CM

## 2024-08-21 PROCEDURE — ZZZZZ: CPT

## 2024-08-21 PROCEDURE — 99203 OFFICE O/P NEW LOW 30 MIN: CPT | Mod: 25,GC

## 2024-08-21 PROCEDURE — 94010 BREATHING CAPACITY TEST: CPT

## 2024-08-21 RX ORDER — BUDESONIDE AND FORMOTEROL FUMARATE DIHYDRATE 80; 4.5 UG/1; UG/1
80-4.5 AEROSOL RESPIRATORY (INHALATION) TWICE DAILY
Qty: 1 | Refills: 2 | Status: DISCONTINUED | COMMUNITY
Start: 2024-08-21 | End: 2024-08-21

## 2024-08-21 RX ORDER — BUDESONIDE AND FORMOTEROL FUMARATE DIHYDRATE 160; 4.5 UG/1; UG/1
160-4.5 AEROSOL RESPIRATORY (INHALATION)
Qty: 1 | Refills: 11 | Status: ACTIVE | COMMUNITY
Start: 2024-08-21 | End: 1900-01-01

## 2024-08-21 NOTE — PHYSICAL EXAM
[No Acute Distress] : no acute distress [Normal Oropharynx] : normal oropharynx [Normal Appearance] : normal appearance [No Neck Mass] : no neck mass [Irregular rate/rhythm] : irregular rate/rhythm [No Acc Muscle Use] : no acc muscle use [Wheeze] : wheeze [No Abnormalities] : no abnormalities [Normal Gait] : normal gait [No Clubbing] : no clubbing [No Edema] : no edema [Normal Color/ Pigmentation] : normal color/ pigmentation [No Focal Deficits] : no focal deficits [Normal Rate/Rhythm] : normal rate/rhythm [Normal S1, S2] : normal s1, s2

## 2024-08-21 NOTE — ASSESSMENT
[FreeTextEntry1] : 74 year old female with PMH Afib, heart failure, LBBB, HLD with Uncontrolled Asthma, currently exacerbated.   #Asthma Eosinophilic - Uncontrolled - Start Symbicort 80/4.5 2 inh twice daily + 1inh as rescue as needed - Spacer given  - Continue albuterol 2 inh every 6 h while on exacerbation - Follow up in 4-6 weeks

## 2024-08-21 NOTE — HISTORY OF PRESENT ILLNESS
[TextBox_4] : 74 year old female with PMH Afib, heart failure, LBBB, HLD who was referred after hospitalization for presumed asthma exacerbation.   Per chart review patient has been admitted twice in the past 6 months for shortness of breath which was treated with steroids and azithromycin. Describes episodes are mostly cough dry, SOB with wheezing, no chest tightness. Patient has no documented history of asthma but does have history of biomass smoke exposure (10 years ago when living in [place]). Nonsmoker. Patient describes no childhood respiratory infections. Denies history of TB. Used to work in agriculture. Denies any fever, chills, productive phlegm, chest pain, light headedness, syncope, heartburn.   Daytime symptoms: Mostly when exposed to cold, upon exertion or when excited/talking too much Nighttime symptoms: If cold, maged or on wee hours    Ancillary Work Up PFT with FEV1/FVC 71% with FEV1 64% - moderate obstruction. DLCO unable to perform.  CBC with Eosinophilia 670 Ige 300 CT Chest and CXR unremarkable

## 2024-08-21 NOTE — REVIEW OF SYSTEMS
[Cough] : cough [Dyspnea] : dyspnea [Wheezing] : wheezing [Negative] : Neurologic [Hemoptysis] : no hemoptysis [Chest Tightness] : no chest tightness [Frequent URIs] : no frequent URIs [Sputum] : no sputum [Pleuritic Pain] : no pleuritic pain [A.M. Dry Mouth] : no a.m. dry mouth [SOB on Exertion] : no sob on exertion

## 2024-09-17 ENCOUNTER — EMERGENCY (EMERGENCY)
Facility: HOSPITAL | Age: 74
LOS: 1 days | Discharge: HOME | End: 2024-09-17
Attending: STUDENT IN AN ORGANIZED HEALTH CARE EDUCATION/TRAINING PROGRAM | Admitting: STUDENT IN AN ORGANIZED HEALTH CARE EDUCATION/TRAINING PROGRAM
Payer: MEDICAID

## 2024-09-17 VITALS
OXYGEN SATURATION: 100 % | DIASTOLIC BLOOD PRESSURE: 58 MMHG | RESPIRATION RATE: 22 BRPM | HEART RATE: 76 BPM | SYSTOLIC BLOOD PRESSURE: 106 MMHG | TEMPERATURE: 98 F

## 2024-09-17 VITALS
OXYGEN SATURATION: 95 % | WEIGHT: 145.06 LBS | SYSTOLIC BLOOD PRESSURE: 101 MMHG | HEART RATE: 72 BPM | RESPIRATION RATE: 18 BRPM | DIASTOLIC BLOOD PRESSURE: 68 MMHG | TEMPERATURE: 97 F | HEIGHT: 60 IN

## 2024-09-17 LAB
ALBUMIN SERPL ELPH-MCNC: 4.3 G/DL — SIGNIFICANT CHANGE UP (ref 3.3–5)
ALP SERPL-CCNC: 93 U/L — SIGNIFICANT CHANGE UP (ref 40–120)
ALT FLD-CCNC: 16 U/L — SIGNIFICANT CHANGE UP (ref 4–33)
ANION GAP SERPL CALC-SCNC: 14 MMOL/L — SIGNIFICANT CHANGE UP (ref 7–14)
AST SERPL-CCNC: 22 U/L — SIGNIFICANT CHANGE UP (ref 4–32)
BASOPHILS # BLD AUTO: 0.08 K/UL — SIGNIFICANT CHANGE UP (ref 0–0.2)
BASOPHILS NFR BLD AUTO: 0.9 % — SIGNIFICANT CHANGE UP (ref 0–2)
BILIRUB SERPL-MCNC: 0.6 MG/DL — SIGNIFICANT CHANGE UP (ref 0.2–1.2)
BUN SERPL-MCNC: 13 MG/DL — SIGNIFICANT CHANGE UP (ref 7–23)
CALCIUM SERPL-MCNC: 9.8 MG/DL — SIGNIFICANT CHANGE UP (ref 8.4–10.5)
CHLORIDE SERPL-SCNC: 98 MMOL/L — SIGNIFICANT CHANGE UP (ref 98–107)
CO2 SERPL-SCNC: 21 MMOL/L — LOW (ref 22–31)
CREAT SERPL-MCNC: 0.63 MG/DL — SIGNIFICANT CHANGE UP (ref 0.5–1.3)
EGFR: 92 ML/MIN/1.73M2 — SIGNIFICANT CHANGE UP
EOSINOPHIL # BLD AUTO: 0.64 K/UL — HIGH (ref 0–0.5)
EOSINOPHIL NFR BLD AUTO: 7.1 % — HIGH (ref 0–6)
GLUCOSE SERPL-MCNC: 94 MG/DL — SIGNIFICANT CHANGE UP (ref 70–99)
HCT VFR BLD CALC: 38.5 % — SIGNIFICANT CHANGE UP (ref 34.5–45)
HGB BLD-MCNC: 12.6 G/DL — SIGNIFICANT CHANGE UP (ref 11.5–15.5)
IANC: 6.23 K/UL — SIGNIFICANT CHANGE UP (ref 1.8–7.4)
IMM GRANULOCYTES NFR BLD AUTO: 0.4 % — SIGNIFICANT CHANGE UP (ref 0–0.9)
LYMPHOCYTES # BLD AUTO: 1.14 K/UL — SIGNIFICANT CHANGE UP (ref 1–3.3)
LYMPHOCYTES # BLD AUTO: 12.7 % — LOW (ref 13–44)
MCHC RBC-ENTMCNC: 31.2 PG — SIGNIFICANT CHANGE UP (ref 27–34)
MCHC RBC-ENTMCNC: 32.7 GM/DL — SIGNIFICANT CHANGE UP (ref 32–36)
MCV RBC AUTO: 95.3 FL — SIGNIFICANT CHANGE UP (ref 80–100)
MONOCYTES # BLD AUTO: 0.85 K/UL — SIGNIFICANT CHANGE UP (ref 0–0.9)
MONOCYTES NFR BLD AUTO: 9.5 % — SIGNIFICANT CHANGE UP (ref 2–14)
NEUTROPHILS # BLD AUTO: 6.23 K/UL — SIGNIFICANT CHANGE UP (ref 1.8–7.4)
NEUTROPHILS NFR BLD AUTO: 69.4 % — SIGNIFICANT CHANGE UP (ref 43–77)
NRBC # BLD: 0 /100 WBCS — SIGNIFICANT CHANGE UP (ref 0–0)
NRBC # FLD: 0 K/UL — SIGNIFICANT CHANGE UP (ref 0–0)
NT-PROBNP SERPL-SCNC: 6092 PG/ML — HIGH
PLATELET # BLD AUTO: 311 K/UL — SIGNIFICANT CHANGE UP (ref 150–400)
POTASSIUM SERPL-MCNC: 4.1 MMOL/L — SIGNIFICANT CHANGE UP (ref 3.5–5.3)
POTASSIUM SERPL-SCNC: 4.1 MMOL/L — SIGNIFICANT CHANGE UP (ref 3.5–5.3)
PROT SERPL-MCNC: 7.8 G/DL — SIGNIFICANT CHANGE UP (ref 6–8.3)
RBC # BLD: 4.04 M/UL — SIGNIFICANT CHANGE UP (ref 3.8–5.2)
RBC # FLD: 13.7 % — SIGNIFICANT CHANGE UP (ref 10.3–14.5)
SODIUM SERPL-SCNC: 133 MMOL/L — LOW (ref 135–145)
TROPONIN T, HIGH SENSITIVITY RESULT: 18 NG/L — SIGNIFICANT CHANGE UP
WBC # BLD: 8.98 K/UL — SIGNIFICANT CHANGE UP (ref 3.8–10.5)
WBC # FLD AUTO: 8.98 K/UL — SIGNIFICANT CHANGE UP (ref 3.8–10.5)

## 2024-09-17 PROCEDURE — 76604 US EXAM CHEST: CPT | Mod: 26

## 2024-09-17 PROCEDURE — 93010 ELECTROCARDIOGRAM REPORT: CPT

## 2024-09-17 PROCEDURE — 99285 EMERGENCY DEPT VISIT HI MDM: CPT

## 2024-09-17 PROCEDURE — 71046 X-RAY EXAM CHEST 2 VIEWS: CPT | Mod: 26

## 2024-09-17 RX ORDER — PREDNISONE 10 MG
2 TABLET, DOSE PACK ORAL
Qty: 8 | Refills: 0
Start: 2024-09-17 | End: 2024-09-20

## 2024-09-17 RX ORDER — PREDNISONE 10 MG
40 TABLET, DOSE PACK ORAL ONCE
Refills: 0 | Status: COMPLETED | OUTPATIENT
Start: 2024-09-17 | End: 2024-09-17

## 2024-09-17 RX ORDER — IPRATROPIUM BROMIDE AND ALBUTEROL SULFATE .5; 3 MG/3ML; MG/3ML
3 SOLUTION RESPIRATORY (INHALATION) ONCE
Refills: 0 | Status: COMPLETED | OUTPATIENT
Start: 2024-09-17 | End: 2024-09-17

## 2024-09-17 RX ORDER — AZITHROMYCIN 500 MG/1
1 TABLET, FILM COATED ORAL
Qty: 6 | Refills: 0
Start: 2024-09-17

## 2024-09-17 RX ADMIN — IPRATROPIUM BROMIDE AND ALBUTEROL SULFATE 3 MILLILITER(S): .5; 3 SOLUTION RESPIRATORY (INHALATION) at 10:43

## 2024-09-17 RX ADMIN — Medication 2 GRAM(S): at 11:06

## 2024-09-17 RX ADMIN — Medication 150 GRAM(S): at 10:46

## 2024-09-17 RX ADMIN — Medication 40 MILLIGRAM(S): at 10:45

## 2024-09-17 NOTE — ED PROVIDER NOTE - WR INTERPRETATION DATE TIME  1
Death Summary    Cause of Death  Respiratory failure  due to alter level of conscious  due to unknown etiology      Comorbid Conditions at the Time of Death  Principal Problem:    TAMMI (acute kidney injury) (HCC) POA: Yes  Active Problems:    Benign essential hypertension (Chronic) POA: Yes    Paroxysmal atrial fibrillation (HCC) POA: Yes      Overview: Post-operative    Edema POA: Yes    Orthostatic dizziness POA: Unknown    Ground-level fall POA: Unknown    Hip dislocation, left (HCC) POA: Unknown    Delirium POA: Unknown    Advance care planning POA: Unknown  Resolved Problems:    * No resolved hospital problems. *      History of Presenting Illness and Hospital Course  This is a 82 y.o. male admitted 4/24/2023 with  a past medical history significant for CAD, MI, HTN, gout, left hip and knee replacement. who presented on 4/204/2023 after ground-level fall at home onto his left hip with inability to move his left hip. In ER, patient noted to have normal vital signs.  Notable lab findings include RBC at 3.10, hemoglobin 9.9, hematocrit 31.1, glucose 115, BUN 54, creatinine 2.82, GFR 21, calcium 8.3, INR 1.26, PT 15.6.  X-ray left femur notes no acute displaced fracture of the left femur but there is a left superior hip arthroplasty dislocation noted.  Chest x-ray notes left basilar opacity likely representative of consolidation or contusion with probable small effusion versus pleural thickening with moderate enlargement of the cardiomediastinal silhouette.  Patient admitted with hospital medicine for management of TAMMI, postacute evaluation.   PAtietn mentation also has changed and has become more and more confused, he was not eating any more and has failed speech eval. A CT head was done but showed microvascular disease but no hemorrhage or CVA. He was started on abx for possible UTI but mentation still ddi not improve. Tube feeding was discussed with family but patient did not want any aggressive measures and  artifical feeding. After a discussion with family decision was made to transition patient to comfort care.  Patient passed away on 5/7/23 at 21:28      Death Date: 05/07/23   Death Time: 2128         Pronounced By (RN1): Nelly Cuello  Pronounced By (RN2): Kyndra Holstein   17-Sep-2024 13:21

## 2024-09-17 NOTE — ED ADULT NURSE NOTE - OBJECTIVE STATEMENT
Patient is a 74-year-old female, A&OX3, ambulatory at baseline primarily Tamazight speaking with daughter at bedside ([phone]) with a Phx of asthma, AICD (on Eliquis), CHF, Afib, HTN, HLD presenting to the ED complaining of SOB r/t asthma exacerbation. Patient with audible wheezing on exam. Appears tachypneic. 20 G IVL placed in R AC. Labs drawn and sent. O2 > 95% on room air. Bed set in lowest position. VS as noted, see flow sheet. Safety being maintained through out.

## 2024-09-17 NOTE — ED PROVIDER NOTE - PATIENT PORTAL LINK FT
You can access the FollowMyHealth Patient Portal offered by [facility] by registering at the following website: [email or web address]. By joining [employer]’s FollowMyHealth portal, you will also be able to view your health information using other applications (apps) compatible with our system.

## 2024-09-17 NOTE — ED PROVIDER NOTE - CLINICAL SUMMARY MEDICAL DECISION MAKING FREE TEXT BOX
73yo female hx asthma, CHF, afib, AICD, hypertension, prediabetes, presenting with 2 weeks worsening productive cough, found to have diffuse wheezing throughout and crackles in R posterior base on exam, most likely secondary to asthma exacerbation vs bacterial pneumonia superimposed on initial viral URI given 14d worsening symptoms vs exacerbation of CHF though less likely given wheezing, cough, and production of yellow sputum. Plan to give 3 back-to-backs, mag, dex for likely asthma exacerbation. Plan to get CXR to r/o pneumonia.; Plan to obtain proBNP and troponin to assess cardiac function.  -MS4, Tamara Fisher 75yo female hx asthma, CHF, afib, AICD, hypertension, prediabetes, presenting with 2 weeks worsening productive cough, found to have diffuse wheezing throughout and crackles in R posterior base on exam, most likely secondary to asthma exacerbation vs bacterial pneumonia superimposed on initial viral URI given 14d worsening symptoms vs exacerbation of CHF though less likely given wheezing, cough, and production of yellow sputum. Plan to give 3 back-to-backs, mag, dex for likely asthma exacerbation. Plan to get CXR to r/o pneumonia. Plan to obtain proBNP and troponin to assess cardiac function.  -MS4, Tamara Fisher 75yo female hx asthma, CHF, afib, AICD, hypertension, prediabetes, presenting with 2 weeks worsening productive cough, found to have diffuse wheezing throughout and crackles in R posterior base on exam, most likely secondary to asthma exacerbation vs bacterial pneumonia superimposed on initial viral URI given 14d worsening symptoms vs exacerbation of CHF though less likely given wheezing, cough, and production of yellow sputum. Plan to give 3 back-to-backs, mag, dex for likely asthma exacerbation. Plan to get CXR to r/o pneumonia. Plan to obtain proBNP and troponin to assess cardiac function.

## 2024-09-17 NOTE — ED PROVIDER NOTE - PROGRESS NOTE DETAILS
Abrahan Delarosa, ED attending: After prednisone 40mg, MgSo4 2g IV, and duonebs x 3, patient reports feeling much improved. Still has mild inspiratory wheezing, but expiratory wheezing has resolved. POCUS chest performed by ultrasound team revealed mild bilateral B-lines but no focal consolidations.  My wet read of chest x-ray is similar, I see mild bilateral pulmonary vascular congestion but no focal consolidations, no pneumonia.  I had an extensive discussion with the patient and her daughter regarding whether they would like to stay in the hospital versus to go home.  Both of them very much would like to be discharged home.  Currently all vitals are within normal limits, SpO2 97% on room air.  Will prescribe 4 more days of prednisone.  Patient has outpatient follow-up with her pulmonologist tomorrow.  Patient and her daughter also requested if we could prescribe an antibiotic.  It is possible that azithromycin may be beneficial given its anti-inflammatory properties and treat possible atypical pneumonia not seen on chest x-ray.  Will prescribe Z-Barrington as well.  Counseled to continue home nebulizer every 2-4 hours as needed for wheezing, and to return to ED if she feels any worse.

## 2024-09-17 NOTE — ED PROVIDER NOTE - NSICDXPASTMEDICALHX_GEN_ALL_CORE_FT
PAST MEDICAL HISTORY:  Acute on chronic systolic congestive heart failure     Asthma     Chronic atrial fibrillation     Hypertension     S/P ICD (internal cardiac defibrillator) procedure

## 2024-09-17 NOTE — ED PROVIDER NOTE - WR INTERPRETATION 1
CXR: mild bilateral pulm congestion, AICD, no focal consolidation, no pneumonia, unchanged vs. CXR in April 2024

## 2024-09-17 NOTE — ED PROVIDER NOTE - OBJECTIVE STATEMENT
75yo female hx asthma, AICD, CHF, afib, hypertension, hyperlipidemia, and prediabetes presenting with 2 weeks worsening cough. Patient has had congestion with productive cough for two weeks, with coughing episodes accompanied by thick yellow sputum production, shortness of breath, sweating, and decreased energy -- all resolves with resolution of coughing. Cough productive of thick yellow/white sputum. She has been taking albuterol neb/pump q4. Patient was scheduled for follow up with PMD tomorrow but came to ED due to worsening of symptoms. No chest pain nor chest tightness. No vomiting, diarrhea, constipation. No fever. 75yo female hx asthma, AICD, CHF, afib, hypertension, hyperlipidemia, and prediabetes presenting with 2 weeks worsening cough. Patient has had congestion with productive cough for two weeks, with coughing episodes accompanied by shortness of breath, sweating, and decreased energy -- all resolves with resolution of coughing. Cough productive of thick yellow/white sputum. She has been taking albuterol neb/pump q4. Patient was scheduled for follow up with PMD tomorrow but came to ED due to worsening of symptoms. No chest pain nor chest tightness. No vomiting, diarrhea, constipation. No fever. 75yo female hx asthma, AICD, CHF, afib on Eliquis, hypertension, hyperlipidemia, and prediabetes presenting with 2 weeks worsening cough. Patient has had congestion with productive cough for two weeks, with coughing episodes accompanied by shortness of breath, sweating, and decreased energy -- all resolves with resolution of coughing. Cough productive of thick yellow/white sputum. She has been taking albuterol neb/pump q4. Patient was scheduled for follow up with PMD tomorrow but came to ED due to worsening of symptoms. No chest pain nor chest tightness. No vomiting, diarrhea, constipation. No fever.

## 2024-09-17 NOTE — ED PROVIDER NOTE - NSFOLLOWUPINSTRUCTIONS_ED_ALL_ED_FT
You were seen in the [facility] Emergency Department today for [insert diagnosis].    All of the lab and imaging results available upon your time of discharge are included in this discharge paperwork. Please take all of your prescribed or over-the-counter medications as prescribed or as directed.    [We have referred you to {insert specialist} for continued outpatient follow-up.] Please follow up with your primary care physician within one week or as needed for continued management and any further evaluation. Or, if you do not have a primary care physician, you may call patient access services at [phone]) find a primary care physician, or call [phone] to make an appointment with the clinic.    Please return to the emergency department for any worsening symptoms or concerns. You were seen in the [facility] Emergency Department today for asthma exacerbation. Please take one dose of furosemide 20mg today. Please take prednisone 40mg once a day starting tomorrow (9/18/24). Please take azithromycin 500mg today, then 250mg once a day for four days.    All of the lab and imaging results available upon your time of discharge are included in this discharge paperwork. Please take all of your prescribed or over-the-counter medications as prescribed or as directed.    Please follow up with your primary care physician within one week or as needed for continued management and any further evaluation. Or, if you do not have a primary care physician, you may call patient access services at [phone] find a primary care physician, or call [phone] to make an appointment with the clinic.    Please return to the emergency department for any worsening symptoms or concerns.

## 2024-09-17 NOTE — ED PROVIDER NOTE - PHYSICAL EXAMINATION
GEN: Awake, alert. No acute distress.  HEENT: +L eye cloudiness, NCAT, EOMI  CV: unable to appreciate secondary to profound wheezing  RESPI: +inspiratory and expiratory diffuse wheezing throughout, +diminished throughout  ABD: Soft, nondistended, nontender.  EXT: Full ROM, pulses 2+ bilaterally, cap refill <2s  NEURO: Affect appropriate, good tone GEN: Awake, alert. No acute distress.  HEENT: +L eye cloudiness, NCAT, EOMI  CV: unable to appreciate secondary to profound wheezing  RESPI: +inspiratory and expiratory diffuse wheezing throughout, +diminished throughout, +crackles in R posterior base  ABD: Soft, nondistended, nontender.  EXT: Full ROM, pulses 2+ bilaterally, cap refill <2s  NEURO: Affect appropriate, good tone GEN: Awake, alert. No acute distress, but mildly tachypneic.  HEENT: +L eye cloudiness, NCAT, EOMI  CV: RRR  RESPI: +inspiratory and expiratory diffuse wheezing throughout, +diminished throughout, +crackles in R posterior base  ABD: Soft, nondistended, nontender.  EXT: Full ROM, pulses 2+ bilaterally, cap refill <2s  NEURO: CN II-XII intact, no facial droop, no dysarthria, no nystagmus, gait normal, bilateral upper extremity strength 5/5, bilateral lower extremity strength 5/5

## 2024-09-17 NOTE — ED PROVIDER NOTE - ATTENDING CONTRIBUTION TO CARE
I have discussed the patient's case presentation with the student. I have also personally performed a face-to-face evaluation of the patient. I agree with the student's assessment and plan.

## 2024-09-17 NOTE — ED ADULT TRIAGE NOTE - CHIEF COMPLAINT QUOTE
Patient is an asthmatic and for the past three days she has been having a cough and shortness of breath when she coughs. Has yellow phlegm as well when she coughs. Has been using her nebulizer with no relief.

## 2024-09-17 NOTE — ED ADULT NURSE NOTE - NSFALLUNIVINTERV_ED_ALL_ED
Bed/Stretcher in lowest position, wheels locked, appropriate side rails in place/Call bell, personal items and telephone in reach/Instruct patient to call for assistance before getting out of bed/chair/stretcher/Non-slip footwear applied when patient is off stretcher/[place] to call system/Physically safe environment - no spills, clutter or unnecessary equipment/Purposeful proactive rounding/Room/bathroom lighting operational, light cord in reach

## 2024-09-18 ENCOUNTER — APPOINTMENT (OUTPATIENT)
Dept: PULMONOLOGY | Facility: CLINIC | Age: 74
End: 2024-09-18
Payer: MEDICAID

## 2024-09-18 VITALS
RESPIRATION RATE: 15 BRPM | SYSTOLIC BLOOD PRESSURE: 104 MMHG | HEIGHT: 60 IN | HEART RATE: 86 BPM | WEIGHT: 145 LBS | OXYGEN SATURATION: 95 % | BODY MASS INDEX: 28.47 KG/M2 | DIASTOLIC BLOOD PRESSURE: 66 MMHG

## 2024-09-18 DIAGNOSIS — I48.20 CHRONIC ATRIAL FIBRILLATION, UNSP: ICD-10-CM

## 2024-09-18 PROBLEM — I10 ESSENTIAL (PRIMARY) HYPERTENSION: Chronic | Status: ACTIVE | Noted: 2024-09-17

## 2024-09-18 PROBLEM — J45.909 UNSPECIFIED ASTHMA, UNCOMPLICATED: Chronic | Status: ACTIVE | Noted: 2024-09-17

## 2024-09-18 PROCEDURE — 99214 OFFICE O/P EST MOD 30 MIN: CPT | Mod: GC

## 2024-09-18 RX ORDER — FLUTICASONE FUROATE, UMECLIDINIUM BROMIDE AND VILANTEROL TRIFENATATE 200; 62.5; 25 UG/1; UG/1; UG/1
200-62.5-25 POWDER RESPIRATORY (INHALATION)
Qty: 1 | Refills: 2 | Status: ACTIVE | COMMUNITY
Start: 2024-09-18 | End: 1900-01-01

## 2024-09-18 RX ORDER — AMOXICILLIN AND CLAVULANATE POTASSIUM 875; 125 MG/1; MG/1
875-125 TABLET, COATED ORAL
Qty: 10 | Refills: 0 | Status: ACTIVE | COMMUNITY
Start: 2024-09-18 | End: 1900-01-01

## 2024-09-18 RX ORDER — AZITHROMYCIN 500 MG/1
500 TABLET, FILM COATED ORAL DAILY
Qty: 1 | Refills: 0 | Status: ACTIVE | COMMUNITY
Start: 2024-09-18 | End: 1900-01-01

## 2024-09-18 RX ORDER — PREDNISONE 10 MG/1
10 TABLET ORAL
Qty: 10 | Refills: 0 | Status: ACTIVE | COMMUNITY
Start: 2024-09-18 | End: 1900-01-01

## 2024-09-18 NOTE — HISTORY OF PRESENT ILLNESS
[TextBox_4] : 74F with moderate persistent eosinophilic asthma, CHF, LA dilation c/b afib coming in for shortness of breath  went to ED yesterday with cough wheezing, they were c/f chf, but cxr and vitals grossly unchanged. She took a dose of Bumex today, but has been using the albuterol every 2-4 hours. Produces a yellow-dark sputum; no fevers/chills.  Not taking Symbicort, was given Trelegy Ellipta by PCP which she has not taken for 2 weeks. It is covered by insurance  Was prescribed steroids and abx by ED buy has not picked them up because the pharmacy says it did not receive the rx  Feels SOB, still wheezing today No orthopnea or LE edema

## 2024-09-18 NOTE — ASSESSMENT
[FreeTextEntry1] : 74F with moderate persistent eosinophilic asthma, CHF, LA dilation c/b afib coming in for shortness of breath  went to ED yesterday with cough wheezing, they were c/f chf, but cxr and vitals grossly unchanged. She took a dose of Bumex today, but has been using the albuterol every 2-4 hours. Produces a yellow-dark sputum; no fevers/chills.  Not taking Symbicort, was given Trelegy Ellipta by PCP which she has not taken for 2 weeks. It is covered by insurance  Was prescribed steroids and abx by ED buy has not picked them up because the pharmacy says it did not receive the rx  Feels SOB, still wheezing today No orthopnea or LE edema  CXR from yesterday reviewed no overt changes maybe mild cephalization  She may be having an asthma exacerbation provoked by a pneumonia or viral illness, tough to tell vs CHF although she does not appear grossly hypervolemic. BNP elevated She will continue taking her Bumex daily for now, and we will start Trelegy Ellipta daily, with albuterol prn + 5 days of 20 mg of prednisone, Augmentin, and three days of azithromycin 500 mg  If no improvement within a couple of days, will call the office /present to ED  Follow up next Wed

## 2024-09-18 NOTE — PHYSICAL EXAM
[No Acute Distress] : no acute distress [Benign] : benign [Normal Gait] : normal gait [No Clubbing] : no clubbing [No Edema] : no edema [TextBox_54] : irregular [TextBox_68] : bilateral wheezing

## 2024-09-23 ENCOUNTER — NON-APPOINTMENT (OUTPATIENT)
Age: 74
End: 2024-09-23

## 2024-09-24 ENCOUNTER — APPOINTMENT (OUTPATIENT)
Dept: ELECTROPHYSIOLOGY | Facility: CLINIC | Age: 74
End: 2024-09-24
Payer: MEDICAID

## 2024-09-24 ENCOUNTER — NON-APPOINTMENT (OUTPATIENT)
Age: 74
End: 2024-09-24

## 2024-09-24 VITALS — HEART RATE: 74 BPM | DIASTOLIC BLOOD PRESSURE: 72 MMHG | SYSTOLIC BLOOD PRESSURE: 117 MMHG

## 2024-09-24 PROCEDURE — 93284 PRGRMG EVAL IMPLANTABLE DFB: CPT

## 2024-09-24 RX ORDER — DAPAGLIFLOZIN 10 MG/1
10 TABLET, FILM COATED ORAL
Refills: 0 | Status: ACTIVE | COMMUNITY

## 2024-09-24 RX ORDER — METOPROLOL SUCCINATE 25 MG/1
25 TABLET, EXTENDED RELEASE ORAL
Refills: 0 | Status: ACTIVE | COMMUNITY

## 2024-09-25 ENCOUNTER — APPOINTMENT (OUTPATIENT)
Dept: PULMONOLOGY | Facility: CLINIC | Age: 74
End: 2024-09-25
Payer: MEDICAID

## 2024-09-25 VITALS
OXYGEN SATURATION: 97 % | WEIGHT: 146 LBS | TEMPERATURE: 98 F | HEART RATE: 74 BPM | BODY MASS INDEX: 28.66 KG/M2 | DIASTOLIC BLOOD PRESSURE: 61 MMHG | HEIGHT: 60 IN | SYSTOLIC BLOOD PRESSURE: 97 MMHG

## 2024-09-25 DIAGNOSIS — J18.9 PNEUMONIA, UNSPECIFIED ORGANISM: ICD-10-CM

## 2024-09-25 DIAGNOSIS — J45.901 UNSPECIFIED ASTHMA WITH (ACUTE) EXACERBATION: ICD-10-CM

## 2024-09-25 DIAGNOSIS — I50.22 CHRONIC SYSTOLIC (CONGESTIVE) HEART FAILURE: ICD-10-CM

## 2024-09-25 PROCEDURE — 99213 OFFICE O/P EST LOW 20 MIN: CPT | Mod: GC

## 2024-09-25 NOTE — HISTORY OF PRESENT ILLNESS
[TextBox_4] : 74F with moderate persistent eosinophilic asthma, CHF, LA dilation c/b afib presenting for follow-up.  Seen in clinic 9/18 for cough w/ wheezing, dyspnea. Also recent ED visit for same constellation of symptoms. Patient was advised to take bumex daily, 3 days azithromycin 500mg qd, augmentin, Trelegy once daily, albuterol PRN, and 20mg prednisone x5d.   Today is day 5/5 for augmentin and prednisone, finished azithro and has been taking Trelegy once daily, and nebulized albuterol every 3-4 hours. Reports complete resolution of symptoms. Minimal non-productive cough. No dyspnea, wheezing, exercise tolerance limitations, nighttime awakenings.   Daughter provided interpretation

## 2024-09-25 NOTE — REVIEW OF SYSTEMS
[Fever] : no fever [Chills] : no chills [Cough] : no cough [Hemoptysis] : no hemoptysis [Chest Tightness] : no chest tightness [Sputum] : no sputum [Dyspnea] : no dyspnea [Pleuritic Pain] : no pleuritic pain [Wheezing] : no wheezing [Chest Discomfort] : no chest discomfort [Edema] : no edema [Orthopnea] : no orthopnea

## 2024-09-25 NOTE — ASSESSMENT
[FreeTextEntry1] : 74F w/ moderate persistent eosinophilic asthma, CHF presenting for follow-up for cough, wheezing, dyspnea; treated w/ 5d abx, prednisone 20mg, trelegy, nebulized albuterol w/ complete resolution of symptoms.   Recommendations:  - c/w trelegy once daily for maintenance - c/w nebulized albuterol TID x1week; can liberalize to BID after if she continues to feel well   RTC 4-6 weeks.

## 2024-09-25 NOTE — PHYSICAL EXAM
[No Acute Distress] : no acute distress [Normal Oropharynx] : normal oropharynx [Normal Appearance] : normal appearance [Normal Rate/Rhythm] : normal rate/rhythm [Normal S1, S2] : normal s1, s2 [No Resp Distress] : no resp distress [No Acc Muscle Use] : no acc muscle use [Clear to Auscultation Bilaterally] : clear to auscultation bilaterally

## 2024-12-17 ENCOUNTER — RESULT REVIEW (OUTPATIENT)
Age: 74
End: 2024-12-17

## 2024-12-17 NOTE — ED ADULT NURSE REASSESSMENT NOTE - NS ED NURSE REASSESS COMMENT FT1
Break RN: Patient awake and resting in stretcher; VSS, respirations even and unlabored, no signs/symptoms of acute distress. Patient denies dyspnea, shortness of breath, and chest pain. Patient denies pain and offers no complaints at this time. Patient placed on tele. monitor, stable rhythm on tele., safety measures in place and family member at bedside.

## 2024-12-17 NOTE — ED PROVIDER NOTE - TOBACCO USE
Clinical Abstract:     [facility] 1/10/2017  BP= 120/72 (89) mmHg HR= 94 AO sat= 100%  RA= 2 mmHg.  RV= 38/4 mmHg.  PAP= 34/14 (20) mmHg.  PA sat= 79.4%  PCWP= 10 mmHg.  TPG= 10 mmHg  PVR 1.2 Vega  SVR 10.1 Vega  Thermodilution  Cardiac output (L/min)/cardiac index (L/min/m2)= 8.6/4.9.    HOLTER 12/30/2016  Event monitoring period documented no clinically significant arrhythmias. No significant pauses. Clinical correlation is urged.    EKG 12/19/2016  Normal sinus rhythm  Septal infarct (cited on or before 03-SEP-2015)  Abnormal ECG  When compared with ECG of 03-SEP-2015 08:26, T wave amplitude has increased in Inferior leads  T wave amplitude has increased in Anterolateral leads    XSE 12/19/2016  Left ventricular ejection fraction normal at baseline. Estimated left ventricular ejection fraction 65 %. Left ventricular ejection  fraction increased with stress. Normal LV regional wall motion at baseline. No LV regional wall motion abnormalities with stress. LV  systolic volume decreased with stress. Definity contrast was utilized to better visualize the endocardial definition.  No evidence of myocardial ischemia with stress.  Normal treadmill stress echocardiogram.    ECHO 7/5/2016  Normal left ventricular size, systolic function and wall thickness, with no regional wall motion abnormalities. LVEF= 60 %.  Grade II/IV diastolic dysfunction. Global longitudinal strain -24 %.  Normal right ventricular size and systolic function. PASP= 43 mm Hg.  Moderately increased left atrial volume 42 ml/m².  Moderate tricuspid valve regurgitation.  Normal size aortic root and proximal ascending aorta.  No pericardial effusion.  Compared to prior study from 10/2/2014, tricuspid valve regurgitation severity has increased.      CV RISK ASSESSMENT:   Probability of CV events (myocardial infarction, PCI/Revascularization, Death) 1 year Post Kidney Transplant: 1%  Kidney Transplant CV Risk (PORT Risk Score): 6   Patient is low risk for perioperative  adverse cardiovascular events based on ACC/AHA guidelines.       Recommend annual diagnostic tests as per CV risk assessment protocol for all patients on the wait list for abdominal organ transplant.    Please feel free to call me if there are any further questions.       Willard Drummond MD  Cardiologist  [facility]       Never smoker

## 2024-12-17 NOTE — H&P ADULT - ASSESSMENT
75 y/o M with pmhx of chronic Afib on Eliquis, HFrEF (EF15%)  s/p CRT-D (upgraded from ICDon 7/21/23) HTN, HLD who presents for 1 week of SOB.

## 2024-12-17 NOTE — PATIENT PROFILE ADULT - FALL HARM RISK - HARM RISK INTERVENTIONS

## 2024-12-17 NOTE — H&P ADULT - NSHPREVIEWOFSYSTEMS_GEN_ALL_CORE
CONSTITUTIONAL: No fever, no chills  EYES: No eye pain, no acute blindness  Mouth: no pain in mouth, no cuts  RESPIRATORY: No cough, + sob  CARDIOVASCULAR: No CP, no palpitations  GASTROINTESTINAL: no abdominal pain, no n/v/d  GENITOURINARY: No dysuria, no hematuria  Heme: No easy bruising, no swelling of neck  NEUROLOGICAL: No seizure, No acute paralysis  SKIN: No itching, no rashes  MUSCULOSKELETAL: No acute joint pain, no joint swelling

## 2024-12-17 NOTE — H&P ADULT - NSHPPHYSICALEXAM_GEN_ALL_CORE
T(C): 36.8 (12-17-24 @ 14:22), Max: 36.9 (12-17-24 @ 07:48)  HR: 75 (12-17-24 @ 14:22) (75 - 79)  BP: 101/65 (12-17-24 @ 14:22) (98/54 - 131/52)  RR: 18 (12-17-24 @ 14:22) (18 - 18)  SpO2: 98% (12-17-24 @ 14:22) (98% - 100%)    CONSTITUTIONAL: Well groomed, elderly female, no acute distress   EYES: PERRLA and symmetric, EOMI, No conjunctival or scleral injection, non-icteric  ENMT: Oral mucosa with moist membranes  RESP: No respiratory distress, no use of accessory muscles; CTA b/l, no WRR  CV: RRR, +S1S2, no MRG; + JVD; no peripheral edema  GI: Soft, NT, ND, no rebound, no guarding; no palpable masses; no hepatosplenomegaly; no hernia palpated  SKIN: No rashes or ulcers noted; no subcutaneous nodules or induration palpable  PSYCH: Appropriate insight/judgment; A+O x 3, mood and affect appropriate, recent/remote memory intact

## 2024-12-17 NOTE — ED ADULT TRIAGE NOTE - CHIEF COMPLAINT QUOTE
pt c/o sob, body aches and lethargy X a few days. Denies chest pain, abd pain, n/v/d, fevers/chills. Hx: HTN, Afib pt c/o sob, body aches and lethargy X a few days. Denies chest pain, abd pain, n/v/d, fevers/chills. Hx: HTN, Afib, asthma

## 2024-12-17 NOTE — ED PROVIDER NOTE - PROGRESS NOTE DETAILS
Patient hyponatremic (sodium 124), with mild transaminitis noted on labs. Serum studies and osmolality ordered. Pro BNP 8,492. B-lines noted on ultrasound, as well as a trace pericardial effusion and small/moderate pleural effusion. Patient appears tachypneic on reexamination however lung sounds are clear and there is no lower extremity edema noted.   Pending chest CT and CT of abdomen/pelvis.  - MD Eros PGY-1 CT Chest, Abdomen and Pelvis show Cardiomegaly and small right pleural effusion. Trace free fluid in the pelvis, nonspecific wall thickening of the gallbladder, likely sequela of right heart failure. Patient  - MD Eros

## 2024-12-17 NOTE — H&P ADULT - TIME BILLING
Time spent to prepare to see the patient, obtaining and reviewing history, physical examination, explaining the diagnosis, prognosis and treatment plan with the patient/family/caregiver. I also have spent the time ordering studies and testing, interpreting results, medicine reconciliation, subspecialty consultation and documentation as above

## 2024-12-17 NOTE — ED PROVIDER NOTE - NS ED ROS FT
GENERAL: No fever or chills  EYES: No change in vision  HEENT: No trouble swallowing or speaking  CARDIAC: No chest pain  PULMONARY: +Cough, +SOB  GI: No abdominal pain, no nausea or no vomiting, no diarrhea or constipation  : No changes in urination  SKIN: No rashes  NEURO: No headache, no numbness  MSK: No joint pain  Otherwise as HPI or negative.

## 2024-12-17 NOTE — H&P ADULT - HISTORY OF PRESENT ILLNESS
75 y/o M with pmhx of chronic Afib on Eliquis, HFrEF (EF15%)  s/p CRT-D (upgraded from ICDon 7/21/23) HTN, HLD who presents for 1 week of SOB. Daughter Rhona at bedside provides translation, States that over the also week patient with increased SOB especially with ambulation. Per daughter patient has not been compliant with lasix, missed several days. Denies any chest pain, cough, fevers or chills.       In ED, VSS. CT chest with cardiomegaly with small pleff and trace free fluid in the pelvis, nonspecific wall thickening of the gallbladder likely sequela of heart failure. Surgery was consulted for gallbladder finding no acute surgical intervention. Labs notable elevated ,  and .

## 2024-12-17 NOTE — ED PROVIDER NOTE - CLINICAL SUMMARY MEDICAL DECISION MAKING FREE TEXT BOX
74-year-old female with a past medical history of congestive heart failure, asthma, chronic A-fib, hypertension, status post ICD presents to the ED for SOB. Pt endorsing worsening shortness of breath today. Pt tachypneic but otherwise well appearing. Lungs clear b/l. No peripheral edema. Differentials include but not limited to HF exacerbation, PNA, viral illness. Plan for labs, CXR, EKG. Will give diuretic and reassess. Dispo likely admission.

## 2024-12-17 NOTE — CONSULT NOTE ADULT - ATTENDING COMMENTS
Does not fit criteria for acute cholecystitis based on symptoms.  care per medical team  no acute surgical intervention    I have personally interviewed and examined this patient, reviewed pertinent labs and imaging, and discussed the case with colleagues, residents, and physician assistants on B Team rounds.    The active care issues are:  1. r/o acute cholecystitis     [facility] Acute [facility] Surgery (B Team) Attending Group Practice    urgent issues - spectra [id number]  nonurgent issues - [phone]  patient appointments or afterhours - [phone]

## 2024-12-17 NOTE — CONSULT NOTE ADULT - SUBJECTIVE AND OBJECTIVE BOX
GENERAL SURGERY CONSULT NOTE  Consulting surgical team: [facility]  Consulting attending: [facility]    HPI:  74F with history of CHF, asthma, afib, HTN, AICD admitted for CHF exacerbation. Surgery consulted for gallstones with wall thickening on imaging i/s/o elevated LFTs. Patient is accompanied by daughter who assisted in history/interpretation. She and patient report she initially presented with SOB, which has since improved with diuretics. Denies any abdominal pain, fevers, N/V, or any history of postprandial pain.       PAST MEDICAL HISTORY:  Chronic atrial fibrillation  S/P ICD (internal cardiac defibrillator) procedure  Acute on chronic systolic congestive heart failure  Asthma  Hypertension    PAST SURGICAL HISTORY:      SOCIAL HISTORY:  - Denies EtOH abuse, smoking, IVDA    MEDICATIONS:  aluminum hydroxide/magnesium hydroxide/simethicone Suspension 30 milliLiter(s) Oral every 4 hours PRN      ALLERGIES:  No Known Allergies      VITALS & I/Os:  Vital Signs Last 24 Hrs  T(C): 36.7 (17 Dec 2024 12:30), Max: 36.9 (17 Dec 2024 07:48)  T(F): 98.1 (17 Dec 2024 12:30), Max: 98.4 (17 Dec 2024 07:48)  HR: 77 (17 Dec 2024 12:30) (77 - 79)  BP: 98/54 (17 Dec 2024 12:30) (98/54 - 131/52)  BP(mean): 68 (17 Dec 2024 12:30) (68 - 68)  RR: 18 (17 Dec 2024 12:30) (18 - 18)  SpO2: 100% (17 Dec 2024 12:30) (99% - 100%)    Parameters below as of 17 Dec 2024 12:30  Patient On (Oxygen Delivery Method): room air        I&O's Summary      PHYSICAL EXAM:  GEN: resting comfortably in bed, in NAD  HEENT: normocephalic, atraumatic, no scleral icterus  CV: hemodynamically stable  PULM: nonlabored breathing  ABD: soft, nontender, nondistended. No rebound/guarding. -Hernandes's sign.  EXTREMITIES: Grossly symmetric, WWP  NEURO: AAOx4, no focal neuro deficits; CN II-IX intact    LABS:                        11.2   7.66  )-----------( 261      ( 17 Dec 2024 08:30 )             34.3     12-17    124[L]  |  90[L]  |  13  ----------------------------<  140[H]  4.0   |  19[L]  |  0.57    Ca    8.8      17 Dec 2024 08:30    TPro  6.9  /  Alb  4.0  /  TBili  1.0  /  DBili  x   /  AST  176[H]  /  ALT  236[H]  /  AlkPhos  135[H]      Lactate:   @ 08:30  2.1              Urinalysis Basic - ( 17 Dec 2024 12:04 )    Color: Yellow / Appearance: Clear / S.016 / pH: x  Gluc: x / Ketone: Negative mg/dL  / Bili: Negative / Urobili: 0.2 mg/dL   Blood: x / Protein: Negative mg/dL / Nitrite: Negative   Leuk Esterase: Negative / RBC: x / WBC x   Sq Epi: x / Non Sq Epi: x / Bacteria: x        IMAGING:  < from: CT Abdomen and Pelvis w/ IV Cont (24 @ 10:19) >  ABDOMEN AND PELVIS:  LIVER: Steatosis. No focal hepatic lesion.  BILE DUCTS: Normal caliber.  GALLBLADDER: Layering sludge. There is gallbladder wall thickening with   pericholecystic fat stranding.  SPLEEN: Within normal limits.  PANCREAS: Within normal limits.  ADRENALS: Within normal limits.  KIDNEYS/URETERS: Within normal limits.    BLADDER: Within normal limits.  REPRODUCTIVE ORGANS: Uterus and adnexa within normal limits.    BOWEL: No bowel obstruction. Appendix is normal.  PERITONEUM/RETROPERITONEUM: Within normal limits.  VESSELS: Atherosclerotic changes.  LYMPH NODES: No lymphadenopathy.  ABDOMINAL WALL: Within normal limits.  BONES: Degenerative changes.    IMPRESSION:  *  Cardiomegaly and small right pleural effusion.  *  Trace free fluid in the pelvis, nonspecific wall thickening of the   gallbladder, likely sequela of right heart failure. Correlate with LFTS.    --- End of Report ---        < end of copied text >  < from: POCUS ED Abdomen Limited (24 @ 12:53) >  Findings: Grayscale imaging of the right upper quadrant was performed.  The gallbladder was visualized and scanned in longitudinal and transverse   planes.  The anterior gallbladder wall measured  0.6.cm.  The common bile duct measured 0.75.cm.  Gallstones present.  Sludge present.  Pericholecystic fluid present.  Gallbladder wall edema present.  Sonographic Hernandes's sign not present.  There are multiple gallstones noted within the dependent portions of the   gallbladder along with a mildly thickened anterior gallbladder wall and   very minimal pericholecystic fluid.  However patient did not have any tenderness during right upper quadrant   exam.    IMPRESSION: Cholelithiasis with some findings concerning for   cholecystitis.  Recommend clinical correlation.  CBD is appropriate for age.    --- End of Report ---      < end of copied text >

## 2024-12-17 NOTE — ED ADULT NURSE NOTE - CHIEF COMPLAINT QUOTE
pt c/o sob, body aches and lethargy X a few days. Denies chest pain, abd pain, n/v/d, fevers/chills. Hx: HTN, Afib, asthma nontender/bowel sounds normal/normal

## 2024-12-17 NOTE — ED PROVIDER NOTE - PHYSICAL EXAMINATION
Isabel Cooney MD (PGY-1)  Physical Exam:    Gen: NAD, AOx3  Head: NCAT  HEENT: EOMI, PEERLA  Lung: CTAB, no respiratory distress, no wheezes/rhonchi/rales B/L  CV: RRR, no murmurs, rubs or gallops  Abd: soft, NT, ND, no guarding, no rigidity, no rebound tenderness, no CVA tenderness   MSK: no visible deformities, ROM normal in UE/LE, no back pain  Neuro: No focal sensory or motor deficits. Sensation intact to light touch all extremities.  Skin: Warm, well perfused, no rash, no leg swelling  Psych: normal affect, calm
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
## 2024-12-17 NOTE — ED ADULT NURSE REASSESSMENT NOTE - NS ED NURSE REASSESS COMMENT FT1
Break RN: Patient awake and resting in stretcher; respirations even and unlabored, no signs/symptoms of acute distress. Report given to LLOYD Barrett, patient transported in stable condition to CDU bed 3.

## 2024-12-17 NOTE — CONSULT NOTE ADULT - ASSESSMENT
74F with history of CHF, asthma, afib, HTN, AICD admitted for CHF exacerbation. Surgery consulted for gallstones with wall thickening on imaging i/s/o elevated LFTs. Patient is accompanied by daughter who assisted in history/interpretation. She and patient report she initially presented with SOB, which has since improved with diuretics. Denies any abdominal pain, fevers, N/V, or any history of postprandial pain.     Recs:  - No acute surgical intervention  - Fluid findings and LFTs likely secondary to CHF exacerbation  - Remainder of care per primary    Final recs pending discussion with Dr. Alban TAYLOR Team Surgery  [id number]

## 2024-12-17 NOTE — H&P ADULT - NSHPLABSRESULTS_GEN_ALL_CORE
LABS:  cret                        11.2   7.66  )-----------( 261      ( 17 Dec 2024 08:30 )             34.3     12-17    124[L]  |  90[L]  |  13  ----------------------------<  140[H]  4.0   |  19[L]  |  0.57    Ca    8.8      17 Dec 2024 08:30    TPro  6.9  /  Alb  4.0  /  TBili  1.0  /  DBili  x   /  AST  176[H]  /  ALT  236[H]  /  AlkPhos  135[H]  12-17    < from: CT Chest w/ IV Cont (12.17.24 @ 10:19) >    FINDINGS:  CHEST:  LUNGS AND LARGE AIRWAYS: Patent central airways. No pulmonary nodules.   Calcified granuloma in the right lower lobe.  PLEURA: Small right pleural effusion.  VESSELS: Atherosclerotic calcifications of the aorta. No incidental   aortic aneurysm or dissection. No incidental pulmonary embolism to the   level of the segmental branches of the pulmonary arteries.  HEART: Cardiomegaly. Small pericardial effusion. There is reflux of   contrast into the IVC and hepatic veins which appear dilated, likely   representing right heart dysfunction. Cardiac device leads appear intact.  MEDIASTINUM AND LAVERNE: No lymphadenopathy.  CHEST WALL AND LOWER NECK: Left chest wall subcutaneous cardiac device.   Right suprascapular intramuscular lipoma.    ABDOMEN AND PELVIS:  LIVER: Steatosis. No focal hepatic lesion.  BILE DUCTS: Normal caliber.  GALLBLADDER: Layering sludge. There is gallbladder wall thickening with   pericholecystic fat stranding.  SPLEEN: Within normal limits.  PANCREAS: Within normal limits.  ADRENALS: Within normal limits.  KIDNEYS/URETERS: Within normal limits.    BLADDER: Within normal limits.  REPRODUCTIVE ORGANS: Uterus and adnexa within normal limits.    BOWEL: No bowel obstruction. Appendix is normal.  PERITONEUM/RETROPERITONEUM: Within normal limits.  VESSELS: Atherosclerotic changes.  LYMPH NODES: No lymphadenopathy.  ABDOMINAL WALL: Within normal limits.  BONES: Degenerative changes.    IMPRESSION:  *  Cardiomegaly and small right pleural effusion.  *  Trace free fluid in the pelvis, nonspecific wall thickening of the   gallbladder, likely sequela of right heart failure. Correlate with LFTS.    < end of copied text >

## 2024-12-17 NOTE — ED PROVIDER NOTE - ATTENDING CONTRIBUTION TO CARE
74-year-old female with a past medical history of CHF, hypertension, asthma, A-fib on Eliquis, s/p ICD, TTE in 2023 shows severe global left ventricular systolic dysfunction with an ejection fraction of 15% who presents to the ED complaining of    Physical exam  Vital signs within acceptable range    MDM:  Outpatient chart review: in September 2024 patient was seen for an asthma exacerbation in which she was deemed stable to be discharged home 74-year-old female with a past medical history of CHF, hypertension, asthma, A-fib on Eliquis, s/p ICD, TTE in 2023 shows severe global left ventricular systolic dysfunction with an ejection fraction of 15% who presents to the ED complaining of Shortness of breath for the past few days with a dry cough.  Additionally she also notes epigastric fullness that wraps around her abdomen and feels like a "burning sensation".  She endorses constipation for the past 1 to 2 days without nausea vomiting.  She is passing gas.  Denies fevers chills diaphoresis, chest pain shortness of breath.    Interpretation provided by daughter.  Formal  offered but declined.    Physical exam:   Vital signs stable.  Overall well-appearing female in no acute distress  Heart is regular rate and rhythm without murmurs or gallop  Lungs are clear to auscultation without wheezes rales or rhonchi  Abdomen is soft, minimally distended, minimal tenderness in the epigastric region without rebound or guarding  No CVA tenderness  Moving all extremity spontaneously  No lower extremity swelling, no JVD      MDM:  Outpatient chart review: in September 2024 patient was seen for an asthma exacerbation in which she was deemed stable to be discharged home.    EKG shows an atrial sensed, ventricular paced rhythm, unchanged when compared to prior.  Troponin is 16, patient not having any chest pain at this time.  Low concern for ACS  Patient is unable hemoglobin 11.2, 1.4 g drop from September.  She denies any source of bleeding.  No leukocytosis.  Patient found to be significantly hyponatremic to 124, she is mentating appropriately.  Patient does have transaminitis with an alk phos of 135, , ALT is 236.    proBNP is 8500.  Her lungs are clear, no JVD, no lower extremity swelling.  She is on room air.  Patient is breathing comfortably.  Will obtain a CT chest with IV contrast to further assess for CHF exacerbation.  Point-of-care echo does show B-lines.  proBNP is 8000.    Patient pending CT of the abdomen, will provide GI cocktail and reassessment. 74-year-old female with a past medical history of CHF, hypertension, asthma, A-fib on Eliquis, s/p ICD, TTE in 2023 shows severe global left ventricular systolic dysfunction with an ejection fraction of 15% who presents to the ED complaining of Shortness of breath for the past few days with a dry cough.  Additionally she also notes epigastric fullness that wraps around her abdomen and feels like a "burning sensation".  She endorses constipation for the past 1 to 2 days without nausea vomiting.  She is passing gas.  Denies fevers chills diaphoresis, chest pain shortness of breath.    Interpretation provided by daughter.  Formal  offered but declined.    Physical exam:   Vital signs stable.  Overall well-appearing female in no acute distress  Heart is regular rate and rhythm without murmurs or gallop  Lungs are clear to auscultation without wheezes rales or rhonchi  Abdomen is soft, minimally distended, minimal tenderness in the epigastric region without rebound or guarding  No CVA tenderness  Moving all extremity spontaneously  No lower extremity swelling, no JVD      MDM:  Outpatient chart review: in September 2024 patient was seen for an asthma exacerbation in which she was deemed stable to be discharged home.    EKG shows an atrial sensed, ventricular paced rhythm, unchanged when compared to prior.  Troponin is 16, patient not having any chest pain at this time.  Low concern for ACS  Patient is unable hemoglobin 11.2, 1.4 g drop from September.  She denies any source of bleeding.  No leukocytosis.  Patient found to be significantly hyponatremic to 124, she is mentating appropriately.  Patient does have transaminitis with an alk phos of 135, , ALT is 236.    proBNP is 8500.  Her lungs are clear, no JVD, no lower extremity swelling.  She is on room air.  Patient is breathing comfortably.  Will obtain a CT chest with IV contrast to further assess for CHF exacerbation.  Point-of-care echo does show B-lines.  proBNP is 8000.    Patient pending CT of the abdomen/chest, will provide GI cocktail and reassessment.    CT chest shows cardiomegaly small right-sided pleural effusion, CT abdomen shows trace fluid in the pelvis with nonspecific thickening of the gallbladder.  Patient does have elevated LFTs.  General surgery consulted.    Given patient's hyponatremia, potential CHF exacerbation cause.  Patient given Lasix.  Admitted to medicine for further care.

## 2024-12-17 NOTE — H&P ADULT - PROBLEM SELECTOR PLAN 1
- presents with SOB x 1 week, non-compliant with furosemide at home  - BNP elevated 8492, CT chest with right pleff with cardiomegaly, +JVD on exam  - received 20mg IV lasix x1 in ED, with improvement   - will start on 40 IV lasix qd   - c/w spironolactone 25mg qd

## 2024-12-17 NOTE — ED ADULT NURSE NOTE - NSFALLRISKINTERV_ED_ALL_ED
Assistance with ambulation/Communicate fall risk and risk factors to all staff, patient, and family/Provide visual cue: yellow wristband, yellow gown, etc/Reinforce activity limits and safety measures with patient and family/Call bell, personal items and telephone in reach/Instruct patient to call for assistance before getting out of bed/chair/stretcher/Non-slip footwear applied when patient is off stretcher/[place] to call system/Physically safe environment - no spills, clutter or unnecessary equipment/Purposeful Proactive Rounding/Room/bathroom lighting operational, light cord in reach

## 2024-12-18 ENCOUNTER — TRANSCRIPTION ENCOUNTER (OUTPATIENT)
Age: 74
End: 2024-12-18

## 2024-12-18 NOTE — DISCHARGE NOTE NURSING/CASE MANAGEMENT/SOCIAL WORK - FINANCIAL ASSISTANCE
[facility] provides services at a reduced cost to those who are determined to be eligible through [facility]’s financial assistance program. Information regarding [facility]’s financial assistance program can be found by going to [email or web address] or by calling [phone].

## 2024-12-18 NOTE — DISCHARGE NOTE PROVIDER - NSDCFUADDAPPT_GEN_ALL_CORE_FT
APPTS ARE READY TO BE MADE: [X] YES    Best Family or Patient Contact (if needed):    Additional Information about above appointments (if needed):    1: PCP  2: Cardiology   3:     Other comments or requests:    APPTS ARE READY TO BE MADE: [X] YES    Best Family or Patient Contact (if needed):    Additional Information about above appointments (if needed):    1: PCP  2: Cardiology   3:       Prior to outreaching the patient, it was visible that the patient has secured a follow up appointment for Pulmonary on 1/8/2025, [address] [place] [address], which was not scheduled by our team.    Patient informed us they already have secured a follow up appointment for PCP on 1/7/2025, which is not visible on [facility].    Patient informed us they already have secured a follow up appointment for Cardiology on 1/9/2025, which is not visible on [facility].  Other comments or requests:

## 2024-12-18 NOTE — DISCHARGE NOTE NURSING/CASE MANAGEMENT/SOCIAL WORK - NSDCFUADDAPPT_GEN_ALL_CORE_FT
APPTS ARE READY TO BE MADE: [X] YES    Best Family or Patient Contact (if needed):    Additional Information about above appointments (if needed):    1: PCP  2: Cardiology   3:     Other comments or requests:

## 2024-12-18 NOTE — DISCHARGE NOTE PROVIDER - CARE PROVIDER_API CALL
Heart failure clinic at [facility],   Phone: (   )    -  Fax: (   )    -  Established Patient  Follow Up Time: 1-3 days    Primary care doctor,   Phone: (   )    -  Fax: (   )    -  Follow Up Time: 1 week

## 2024-12-18 NOTE — DISCHARGE NOTE NURSING/CASE MANAGEMENT/SOCIAL WORK - PATIENT PORTAL LINK FT
You can access the FollowMyHealth Patient Portal offered by [facility] by registering at the following website: [email or web address]. By joining [facility]’s FollowMyHealth portal, you will also be able to view your health information using other applications (apps) compatible with our system.

## 2024-12-18 NOTE — DISCHARGE NOTE PROVIDER - NSDCMRMEDTOKEN_GEN_ALL_CORE_FT
Eliquis 5 mg oral tablet: 1 tab(s) orally 2 times a day  Farxiga 10 mg oral tablet: 1 tab(s) orally once a day  FUROSEMIDE 20MG TABLETS: TAKE 1 TABLET BY MOUTH DAILY AS NEEDED FOR LOWER EXTREMITY EDEMA  losartan 25 mg oral tablet: 1 tab(s) orally once a day  Metoprolol Succinate ER 25 mg oral tablet, extended release: 1 tab(s) orally once a day  spironolactone 25 mg oral tablet: 1 tab(s) orally once a day  Trelegy Ellipta 200 mcg-62.5 mcg-25 mcg/inh inhalation powder: 1 puff(s) inhaled 2 times a day   Eliquis 5 mg oral tablet: 1 tab(s) orally 2 times a day  Farxiga 10 mg oral tablet: 1 tab(s) orally once a day  FUROSEMIDE 20MG TABLETS: TAKE 1 TABLET BY MOUTH DAILY AS NEEDED FOR LOWER EXTREMITY EDEMA  Lasix 40 mg oral tablet: 1 tab(s) orally once a day  losartan 25 mg oral tablet: 1 tab(s) orally once a day  Metoprolol Succinate ER 25 mg oral tablet, extended release: 1 tab(s) orally once a day  spironolactone 25 mg oral tablet: 1 tab(s) orally once a day  Trelegy Ellipta 200 mcg-62.5 mcg-25 mcg/inh inhalation powder: 1 puff(s) inhaled 2 times a day

## 2024-12-18 NOTE — DISCHARGE NOTE PROVIDER - PROVIDER TOKENS
FREE:[LAST:[Heart failure clinic at [facility]],PHONE:[(   )    -],FAX:[(   )    -],FOLLOWUP:[1-3 days],ESTABLISHEDPATIENT:[T]],FREE:[LAST:[Primary care doctor],PHONE:[(   )    -],FAX:[(   )    -],FOLLOWUP:[1 week]]

## 2024-12-18 NOTE — DISCHARGE NOTE PROVIDER - NSDCFUSCHEDAPPT_GEN_ALL_CORE_FT
[facility]  ELECTROPH [id number]  Scheduled Appointment: 12/24/2024    [facility]  PULMMED [address] [facility]  Scheduled Appointment: 01/08/2025

## 2024-12-18 NOTE — DISCHARGE NOTE NURSING/CASE MANAGEMENT/SOCIAL WORK - NSDCPEFALRISK_GEN_ALL_CORE
For information on Fall & Injury Prevention, visit: [email or web address] OR  [email or web address] OR  https://www.cdc.gov/steadi/patient.html

## 2024-12-18 NOTE — DISCHARGE NOTE PROVIDER - ATTENDING DISCHARGE PHYSICAL EXAMINATION:
T 98.1 HR 80 /53 RR 16 satting 100 on RA    Gen: NAD, sitting up in bed  HEENT: NC/AT, MMM  CV: RRR, nl s1, s2, no peripheral edema  RESP: CTAB, no wheezing or crackles  ABD: Soft NT ND +BS  EXT: Warm, no edema

## 2024-12-18 NOTE — PHARMACOTHERAPY INTERVENTION NOTE - NSPHARMCOMMPTEDU
Attended Phase II Cardiac Rehab. No medication or health history changes reported. See [facility] for details.  
Patient Education - Heart Failure

## 2024-12-18 NOTE — DISCHARGE NOTE PROVIDER - HOSPITAL COURSE
74 year old female with chronic Afib on Eliquis, HFrEF (EF15%) s/p CRT-D (upgraded from ICD on 7/21/23) HTN, HLD who presents for 1 week of SOB 2/2 acute on chronic HFrEF exacerbation.    #Acute on chronic HFrEF: Presented with a week of increasing dyspnea in setting of diuretic nonadherence, along with suspected dietary (na/fluid) nonadherence per collateral from daughter. Here also with hypervolemic hyponatremia which improved with diuresis (Na 124->131). proBNP 8k on admission. Cr at baseline. Some evidence of congestive hepatopathy with downtrending LFTs with diuresis. Clinically patient symptomatically improved after IV lasix. Not requiring supplemental O2. Ambulated without issue and without desaturation.   - Patient follows with HF clinic at [facility], had appt scheduled for yesterday, now daughter planning to reschedule for later this week.   Continue home spironolactone, losartan, farxiga. Will discharge on PO lasix 40mg qd, close follow up with HF clinic after discharge along with PCP within one week.  - TTE this admission with EF 12% similar to existing history (near 15%). Will provide complete report to patient to take with her this week to her heart failure cardiologist.      #Chronic paroxysmal afib: c/w eliquis, metoprolol. No acute issues during hospitalization.     Daughter updated at bedside, DC planning 40 minutes.         74 year old female with chronic Afib on Eliquis, HFrEF (EF15%) s/p CRT-D (upgraded from ICD on 7/21/23) HTN, HLD who presents for 1 week of SOB 2/2 acute on chronic HFrEF exacerbation.    #Acute on chronic HFrEF: Presented with a week of increasing dyspnea in setting of diuretic nonadherence, along with suspected dietary (na/fluid) nonadherence per collateral from daughter. Here also with hypervolemic hyponatremia which improved with diuresis (Na 124->131). proBNP 8k on admission. Cr at baseline. Some evidence of congestive hepatopathy with downtrending LFTs with diuresis. Clinically patient symptomatically improved after IV lasix. Not requiring supplemental O2. Ambulated without issue and without desaturation.   - Patient follows with HF clinic at [facility], had appt scheduled for yesterday, now daughter planning to reschedule for later this week.   Continue home spironolactone, losartan, farxiga. Will discharge on PO lasix 40mg qd x5 days then resume prior regimen of lasix 20mg. Close follow up with HF clinic after discharge along with PCP within one week.  - TTE this admission with EF 12% similar to existing history (near 15%). Will provide complete report to patient to take with her this week to her heart failure cardiologist.      #Chronic paroxysmal afib: c/w eliquis, metoprolol. No acute issues during hospitalization.     Daughter updated at bedside, KATRIN planning 40 minutes.

## 2024-12-18 NOTE — DISCHARGE NOTE PROVIDER - NSDCCPCAREPLAN_GEN_ALL_CORE_FT
PRINCIPAL DISCHARGE DIAGNOSIS  Diagnosis: Shortness of breath  Assessment and Plan of Treatment: You presented with signs of fluid overload froma heart failure exacerbation. You were given IV lasix which helped remove excess fluid from your body. You initial sodium was also low, from too much fluid, and this improved to 131 on the day of discharge. Please continue taking all medications as prescribed and follow up with your Cardiology heart failure physician and your PCP within one week. Please have your bloodwork repeated to ensure your sodium remains stable.     PRINCIPAL DISCHARGE DIAGNOSIS  Diagnosis: Shortness of breath  Assessment and Plan of Treatment: You presented with signs of fluid overload froma heart failure exacerbation. You were given IV lasix which helped remove excess fluid from your body. You initial sodium was also low, from too much fluid, and this improved to 131 on the day of discharge. Please continue taking all medications as prescribed and follow up with your Cardiology heart failure physician and your PCP within one week. Please have your bloodwork repeated to ensure your sodium remains stable.  Take Lasix 40mg by mouth once daily for 5 days then follow up with your Cardiologist regarding further lasix dosing.

## 2024-12-18 NOTE — PHARMACOTHERAPY INTERVENTION NOTE - COMMENTS
Using [place]  patient/family counseled on heart failure medication indications, administration, monitoring and side effects. Counseled on fluid and salt restrictions, and maintaining a log of daily weights. Additionally, discussed warning signs of volume overload (shortness of breath, trouble breathing, difficulty with usual activity, edema, rapid weight gain, etc.) and when to seek medical attention. Additionally discussed apixaban including adverse effects and when to seek medical attention (blood in stool, vomit, etc.). Informed patient to notify all providers about being on this medication prior to any planned procedures. Family's questions/concerns were answered/addressed and CHF booklet was provided.     Lisa Barrera, PharmD, BCPS  Clinical Pharmacy Specialist  Spectra: [phone]

## 2024-12-18 NOTE — DISCHARGE NOTE NURSING/CASE MANAGEMENT/SOCIAL WORK - NSDCPETBCESMAN_GEN_ALL_CORE
155 If you are a smoker, it is important for your health to stop smoking. Please be aware that second hand smoke is also harmful.

## 2024-12-18 NOTE — DISCHARGE NOTE PROVIDER - NSDCCPTREATMENT_GEN_ALL_CORE_FT
PRINCIPAL PROCEDURE  Procedure: Complete transthoracic echocardiography  Findings and Treatment: 12/17/24   1. The left ventricular cavity is severely dilated. Left ventricular wall thickness is normal. Left ventricular systolic function is severely decreased with a calculated ejection fraction of 12 % by the Coffman's biplane method of disks. There is global left ventricular hypokinesis. There is increased LV mass and eccentric hypertrophy. There is no evidence of a left ventricular thrombus. Analysis of left ventricular diastolic function and filling pressure is made challenging by the presence of severe mitral regurgitation.   2. The right ventricular cavity is normal in size and right ventricular systolic function is reduced. A device lead is visualized in the right heart.   3. There is mitral valve thickening of the anterior and posterior leaflets. There is calcification of the mitral valve annulus. There is symmetric leaflet tethering. There is severe mitral regurgitation.   4. The aortic valve anatomy cannot be determined. There is calcification of the aortic valve leaflets. There is mild aortic regurgitation.   5. The left atrium is severely dilated with an indexed volume of 62.89 ml/m².   6. Structurally normal tricuspid valve with normal leaflet excursion. There is severe tricuspid regurgitation. Estimated pulmonary artery systolic pressure is 63 mmHg, consistent with severe pulmonary hypertension.

## 2024-12-24 ENCOUNTER — APPOINTMENT (OUTPATIENT)
Dept: ELECTROPHYSIOLOGY | Facility: CLINIC | Age: 74
End: 2024-12-24

## 2024-12-24 ENCOUNTER — NON-APPOINTMENT (OUTPATIENT)
Age: 74
End: 2024-12-24

## 2024-12-24 PROCEDURE — 93295 DEV INTERROG REMOTE 1/2/MLT: CPT

## 2024-12-24 PROCEDURE — 93296 REM INTERROG EVL PM/IDS: CPT

## 2024-12-26 ENCOUNTER — NON-APPOINTMENT (OUTPATIENT)
Age: 74
End: 2024-12-26

## 2024-12-26 ENCOUNTER — APPOINTMENT (OUTPATIENT)
Dept: CARDIOLOGY | Facility: CLINIC | Age: 74
End: 2024-12-26
Payer: MEDICAID

## 2024-12-26 VITALS
WEIGHT: 141.6 LBS | OXYGEN SATURATION: 98 % | HEART RATE: 74 BPM | DIASTOLIC BLOOD PRESSURE: 66 MMHG | BODY MASS INDEX: 27.8 KG/M2 | HEIGHT: 60 IN | SYSTOLIC BLOOD PRESSURE: 104 MMHG

## 2024-12-26 DIAGNOSIS — Z87.01 PERSONAL HISTORY OF PNEUMONIA (RECURRENT): ICD-10-CM

## 2024-12-26 DIAGNOSIS — I48.0 PAROXYSMAL ATRIAL FIBRILLATION: ICD-10-CM

## 2024-12-26 DIAGNOSIS — I27.20 PULMONARY HYPERTENSION, UNSPECIFIED: ICD-10-CM

## 2024-12-26 DIAGNOSIS — J45.40 MODERATE PERSISTENT ASTHMA, UNCOMPLICATED: ICD-10-CM

## 2024-12-26 DIAGNOSIS — I50.22 CHRONIC SYSTOLIC (CONGESTIVE) HEART FAILURE: ICD-10-CM

## 2024-12-26 DIAGNOSIS — J45.901 UNSPECIFIED ASTHMA WITH (ACUTE) EXACERBATION: ICD-10-CM

## 2024-12-26 PROCEDURE — 99204 OFFICE O/P NEW MOD 45 MIN: CPT

## 2024-12-26 PROCEDURE — G2211 COMPLEX E/M VISIT ADD ON: CPT | Mod: NC

## 2024-12-26 PROCEDURE — 93000 ELECTROCARDIOGRAM COMPLETE: CPT

## 2024-12-26 RX ORDER — FUROSEMIDE 20 MG/1
20 TABLET ORAL DAILY
Qty: 90 | Refills: 3 | Status: ACTIVE | COMMUNITY

## 2024-12-29 PROBLEM — J45.901 PERSISTENT ASTHMA WITH ACUTE EXACERBATION, UNSPECIFIED ASTHMA SEVERITY: Status: RESOLVED | Noted: 2024-08-21 | Resolved: 2024-12-29

## 2024-12-29 PROBLEM — I27.20 PULMONARY HYPERTENSION: Status: ACTIVE | Noted: 2024-12-29

## 2024-12-29 PROBLEM — J45.40 ASTHMA, MODERATE PERSISTENT: Status: ACTIVE | Noted: 2024-12-29

## 2024-12-29 PROBLEM — I48.0 PAROXYSMAL ATRIAL FIBRILLATION: Status: ACTIVE | Noted: 2024-12-29

## 2024-12-29 PROBLEM — Z87.01 HISTORY OF PNEUMONIA: Status: RESOLVED | Noted: 2024-09-18 | Resolved: 2024-12-29

## 2025-01-08 ENCOUNTER — APPOINTMENT (OUTPATIENT)
Dept: PULMONOLOGY | Facility: CLINIC | Age: 75
End: 2025-01-08

## 2025-03-06 ENCOUNTER — APPOINTMENT (OUTPATIENT)
Dept: CARDIOLOGY | Facility: CLINIC | Age: 75
End: 2025-03-06

## 2025-03-25 ENCOUNTER — APPOINTMENT (OUTPATIENT)
Dept: ELECTROPHYSIOLOGY | Facility: CLINIC | Age: 75
End: 2025-03-25

## 2025-04-04 NOTE — PATIENT PROFILE ADULT - FALL HARM RISK - PATIENT NEEDS ASSISTANCE
1620  All discharge criteria met. VSS. Pt AAO x 4.  IV d/c'd. Rt. Middle finger drsg CDI. Pt has 2 RX as ordered. D/C instructions reviewed, with pt teach back. Pt. To car via w/c with RN.  
Standing

## 2025-07-24 NOTE — PATIENT PROFILE ADULT - NSPRONUTRITIONRISK_GEN_A_NUR
Start ATARAX/VISTARIL 10-20mg up three times per day as needed for anxiety or insomnia     Restart GUANFACINE at 2mg nightly    Increase CAPLYTA to 42mg nightly    Look into [facility] and [facility] in [place]   No indicators present